# Patient Record
Sex: FEMALE | Race: WHITE | NOT HISPANIC OR LATINO | Employment: PART TIME | URBAN - METROPOLITAN AREA
[De-identification: names, ages, dates, MRNs, and addresses within clinical notes are randomized per-mention and may not be internally consistent; named-entity substitution may affect disease eponyms.]

---

## 2017-01-02 ENCOUNTER — TELEPHONE (OUTPATIENT)
Dept: FAMILY MEDICINE | Facility: CLINIC | Age: 62
End: 2017-01-02

## 2017-01-02 DIAGNOSIS — M25.512 LEFT SHOULDER PAIN: Primary | ICD-10-CM

## 2017-01-02 NOTE — TELEPHONE ENCOUNTER
Reason for call: Patient requesting PT orders for left shoulder pain.  She would like to go to [facility] office [address]    Phone Number Pt can be reached at: [phone]  Best Time: Anytime  Can we leave a detailed message on this number? Yes

## 2017-01-02 NOTE — TELEPHONE ENCOUNTER
I started to pend order for PT at [facility] for left shoulder pain.  I need to know the onset of when the left shoulder pain started.   I called and left VM for patient to call clinic back.    When patient calls back please ask when her left shoulder pain began so that we can place appropriate order.  Patient does not need to speak directly with a nurse unless requested.  Please ask above question and then route to triage.    Thank you!    Sujey Wilson RN

## 2017-01-19 ENCOUNTER — THERAPY VISIT (OUTPATIENT)
Dept: PHYSICAL THERAPY | Facility: CLINIC | Age: 62
End: 2017-01-19
Payer: COMMERCIAL

## 2017-01-19 DIAGNOSIS — M25.512 LEFT SHOULDER PAIN: Primary | ICD-10-CM

## 2017-01-19 PROCEDURE — 97110 THERAPEUTIC EXERCISES: CPT | Mod: GP | Performed by: PHYSICAL THERAPIST

## 2017-01-19 PROCEDURE — 97161 PT EVAL LOW COMPLEX 20 MIN: CPT | Mod: GP | Performed by: PHYSICAL THERAPIST

## 2017-01-19 PROCEDURE — 97112 NEUROMUSCULAR REEDUCATION: CPT | Mod: GP | Performed by: PHYSICAL THERAPIST

## 2017-01-19 NOTE — PROGRESS NOTES
Subjective:                                       Pertinent medical history includes:  Thyroid problems, sleep disorder/apnea and overweight.  Medical allergies: yes (Penicillin, Macrodantin, Sulfa).  Other surgeries include:  Other (Hysterectomy).  Current medications:  Thyroid medication and sleep medication.  Current occupation is  - Critical Care.                                  Objective:    System    Physical Exam    General     ROS    Assessment/Plan:

## 2017-01-19 NOTE — PROGRESS NOTES
Physical Therapy Initial Evaluation  January 19, 2017     Precautions/Restrictions/MD instructions: PT eval and treat.     Subjective:   Date of Onset: 6 months ago.  Primary Symptoms/Location of Pain: L shoulder pain laterally down to elbow (occasional sensation of tingling same place). Feels weak on L side. Denies neck pain. Quality of pain is dull and aching. Pains are described as intermittent in nature. Pain is worse: with increased use. Pain is rated 2/10.   History of symptoms: Pains began gradually as the result of insidious onset. Since onset, symptoms are unchanged.  Worsened by: Pushing off L arm when getting out of bathtub, reaching overhead and lifting objects, reaching to get seatbelt (90/90).    Alleviated by: Nothing.    General health as reported by patient: excellent  Pertinent medical/surgical history: none. Imaging: none. Current occupational status: . Patient's goals are: decrease pain. Return to MD:  PRN.     Therapist Impression:   Ambreen Mora is a 61 year old RHD female with 6-month history of L shoulder pain. She presents with signs and symptoms consistent with primary impingement. These impairments limit her ability to reach in a variety of directions and carry objects. Skilled PT services are necessary in order to reduce impairments and improve independent function.    Objective:  SHOULDER EXAMINATION    CERVICAL SCREEN  AROM: WNL/symmetrical and does not reproduce symptoms    THORACIC SPINE SCREEN  NA  Spring Testing: NA    STATIC POSTURE  Forward head on neck and Rounded shoulders     SCAPULAR KINEMATIC EVALUATION  Position/Test Findings   Hands resting at sides No obvious findings   Hands on hips No obvious findings   90deg scaption No obvious findings   Repeated scaption No obvious findings     SHOULDER RANGE OF MOTION & STRENGTH  (* = patient s pain)   PROM L PROM R AROM L AROM R MMT L MMT R   Flex   *158 (PA) 161     ABD   *161 (PA) 172 *3+ 4+   IR 78 74   5 5   ER 96 91    4 (mild pain) 4+     JOINT MOBILITY  NA    SPECIAL TESTS   (+) L: Neer, Jeffries-Connor, Empty can and Positive painful arc. Positive pull test for resisted ABD.   (-) L: apprehension    Assessment/Plan:    The patient is a 61 year old female with chief complaint of L shoulder pain.    The patient has the following significant findings with corresponding treatment plan.  Diagnosis 1:  L shoulder pain, primary impingement    Pain -  hot/cold therapy, US, electric stimulation, manual therapy, splint/taping/bracing/orthotics and self management  Decreased ROM/flexibility - manual therapy, therapeutic exercise and home program  Decreased joint mobility - manual therapy and therapeutic exercise  Decreased strength - therapeutic exercise, therapeutic activities and home program  Impaired muscle performance - neuro re-education  Decreased function - therapeutic activities  Impaired posture - neuro re-education    Therapy Evaluation Codes:   1) History comprised of:   Personal factors that impact the plan of care:      None.    Comorbidity factors that impact the plan of care are:      None.     Medications impacting care: None.  2) Examination of Body Systems comprised of:   Body structures and functions that impact the plan of care:      Shoulder.   Activity limitations that impact the plan of care are:      reaching and carrying.   Clinical presentation characteristics are:    Stable/Uncomplicated.  3) Presentation comprised of:   Presentation scored as Low complexity with uncomplicated characteristics..  4) Decision-Making    Low complexity using standardized patient assessment instrument and/or measureable assessment of functional outcome.  Cumulative Therapy Evaluation is: Low complexity.    Previous and current functional limitations:  (See Goal Flow Sheet for this information)    Short term and Long term goals: (See Goal Flow Sheet for this information)     Communication ability:  Patient appears to be able to  clearly communicate and understand verbal and written communication and follow directions correctly.  Treatment Explanation - The following has been discussed with the patient: RX ordered/plan of care, anticipated outcomes, and possible risks and side effects.  This patient would benefit from PT intervention to resume normal activities.   Rehab potential is good.    Frequency:  1 X week, once daily  Duration:  for 8 weeks  Discharge Plan: Achieve all LTGs, be independent in home treatment program, and reach maximal therapeutic benefit.    Please refer to the daily flowsheet for treatment today, total treatment time and time spent performing 1:1 timed codes.

## 2017-01-25 ENCOUNTER — TELEPHONE (OUTPATIENT)
Dept: FAMILY MEDICINE | Facility: CLINIC | Age: 62
End: 2017-01-25

## 2017-01-25 ENCOUNTER — HOSPITAL ENCOUNTER (EMERGENCY)
Facility: CLINIC | Age: 62
Discharge: HOME | End: 2017-01-25
Attending: PHYSICIAN ASSISTANT | Admitting: PHYSICIAN ASSISTANT
Payer: COMMERCIAL

## 2017-01-25 ENCOUNTER — APPOINTMENT (OUTPATIENT)
Dept: GENERAL RADIOLOGY | Facility: CLINIC | Age: 62
End: 2017-01-25
Attending: PHYSICIAN ASSISTANT
Payer: COMMERCIAL

## 2017-01-25 VITALS
SYSTOLIC BLOOD PRESSURE: 131 MMHG | DIASTOLIC BLOOD PRESSURE: 91 MMHG | WEIGHT: 212 LBS | BODY MASS INDEX: 33.27 KG/M2 | RESPIRATION RATE: 18 BRPM | HEIGHT: 67 IN | OXYGEN SATURATION: 95 % | TEMPERATURE: 98 F

## 2017-01-25 DIAGNOSIS — R07.89 ATYPICAL CHEST PAIN: ICD-10-CM

## 2017-01-25 DIAGNOSIS — R79.89 ELEVATED TSH: ICD-10-CM

## 2017-01-25 DIAGNOSIS — R79.89 DECREASED THYROXINE (T4) LEVEL: ICD-10-CM

## 2017-01-25 LAB
ALBUMIN SERPL-MCNC: 3.9 G/DL (ref 3.4–5)
ALP SERPL-CCNC: 82 U/L (ref 40–150)
ALT SERPL W P-5'-P-CCNC: 21 U/L (ref 0–50)
ANION GAP SERPL CALCULATED.3IONS-SCNC: 6 MMOL/L (ref 3–14)
AST SERPL W P-5'-P-CCNC: 15 U/L (ref 0–45)
BASOPHILS # BLD AUTO: 0 10E9/L (ref 0–0.2)
BASOPHILS NFR BLD AUTO: 0.5 %
BILIRUB SERPL-MCNC: 0.3 MG/DL (ref 0.2–1.3)
BUN SERPL-MCNC: 10 MG/DL (ref 7–30)
CALCIUM SERPL-MCNC: 8.5 MG/DL (ref 8.5–10.1)
CHLORIDE SERPL-SCNC: 107 MMOL/L (ref 94–109)
CO2 SERPL-SCNC: 27 MMOL/L (ref 20–32)
CREAT SERPL-MCNC: 0.76 MG/DL (ref 0.52–1.04)
D DIMER PPP FEU-MCNC: NORMAL UG/ML FEU (ref 0–0.5)
DIFFERENTIAL METHOD BLD: NORMAL
EOSINOPHIL # BLD AUTO: 0.1 10E9/L (ref 0–0.7)
EOSINOPHIL NFR BLD AUTO: 0.9 %
ERYTHROCYTE [DISTWIDTH] IN BLOOD BY AUTOMATED COUNT: 13.5 % (ref 10–15)
GFR SERPL CREATININE-BSD FRML MDRD: 77 ML/MIN/1.7M2
GLUCOSE SERPL-MCNC: 83 MG/DL (ref 70–99)
HCT VFR BLD AUTO: 42.1 % (ref 35–47)
HGB BLD-MCNC: 14.3 G/DL (ref 11.7–15.7)
IMM GRANULOCYTES # BLD: 0 10E9/L (ref 0–0.4)
IMM GRANULOCYTES NFR BLD: 0.6 %
INTERPRETATION ECG - MUSE: NORMAL
LYMPHOCYTES # BLD AUTO: 2.1 10E9/L (ref 0.8–5.3)
LYMPHOCYTES NFR BLD AUTO: 31.9 %
MCH RBC QN AUTO: 29.2 PG (ref 26.5–33)
MCHC RBC AUTO-ENTMCNC: 34 G/DL (ref 31.5–36.5)
MCV RBC AUTO: 86 FL (ref 78–100)
MONOCYTES # BLD AUTO: 0.4 10E9/L (ref 0–1.3)
MONOCYTES NFR BLD AUTO: 6.6 %
NEUTROPHILS # BLD AUTO: 3.9 10E9/L (ref 1.6–8.3)
NEUTROPHILS NFR BLD AUTO: 59.5 %
NRBC # BLD AUTO: 0 10*3/UL
NRBC BLD AUTO-RTO: 0 /100
PLATELET # BLD AUTO: 206 10E9/L (ref 150–450)
POTASSIUM SERPL-SCNC: 3.8 MMOL/L (ref 3.4–5.3)
PROT SERPL-MCNC: 7.8 G/DL (ref 6.8–8.8)
RBC # BLD AUTO: 4.89 10E12/L (ref 3.8–5.2)
SODIUM SERPL-SCNC: 140 MMOL/L (ref 133–144)
T4 FREE SERPL-MCNC: 0.58 NG/DL (ref 0.76–1.46)
TROPONIN I SERPL-MCNC: NORMAL UG/L (ref 0–0.04)
TSH SERPL DL<=0.005 MIU/L-ACNC: 6.4 MU/L (ref 0.4–4)
WBC # BLD AUTO: 6.5 10E9/L (ref 4–11)

## 2017-01-25 PROCEDURE — 96374 THER/PROPH/DIAG INJ IV PUSH: CPT

## 2017-01-25 PROCEDURE — 25000128 H RX IP 250 OP 636: Performed by: PHYSICIAN ASSISTANT

## 2017-01-25 PROCEDURE — 85025 COMPLETE CBC W/AUTO DIFF WBC: CPT | Performed by: PHYSICIAN ASSISTANT

## 2017-01-25 PROCEDURE — 93005 ELECTROCARDIOGRAM TRACING: CPT

## 2017-01-25 PROCEDURE — 25000132 ZZH RX MED GY IP 250 OP 250 PS 637: Performed by: PHYSICIAN ASSISTANT

## 2017-01-25 PROCEDURE — 84443 ASSAY THYROID STIM HORMONE: CPT | Performed by: PHYSICIAN ASSISTANT

## 2017-01-25 PROCEDURE — 84484 ASSAY OF TROPONIN QUANT: CPT | Performed by: PHYSICIAN ASSISTANT

## 2017-01-25 PROCEDURE — 85379 FIBRIN DEGRADATION QUANT: CPT | Performed by: PHYSICIAN ASSISTANT

## 2017-01-25 PROCEDURE — 25000125 ZZHC RX 250: Performed by: PHYSICIAN ASSISTANT

## 2017-01-25 PROCEDURE — 80053 COMPREHEN METABOLIC PANEL: CPT | Performed by: PHYSICIAN ASSISTANT

## 2017-01-25 PROCEDURE — 84439 ASSAY OF FREE THYROXINE: CPT | Performed by: PHYSICIAN ASSISTANT

## 2017-01-25 PROCEDURE — 96361 HYDRATE IV INFUSION ADD-ON: CPT

## 2017-01-25 PROCEDURE — 71020 XR CHEST 2 VW: CPT

## 2017-01-25 PROCEDURE — 99285 EMERGENCY DEPT VISIT HI MDM: CPT | Mod: 25

## 2017-01-25 RX ORDER — NITROGLYCERIN 0.4 MG/1
0.4 TABLET SUBLINGUAL ONCE
Status: COMPLETED | OUTPATIENT
Start: 2017-01-25 | End: 2017-01-25

## 2017-01-25 RX ORDER — LORAZEPAM 0.5 MG/1
0.25-0.5 TABLET ORAL EVERY 8 HOURS PRN
Qty: 4 TABLET | Refills: 0 | Status: SHIPPED | OUTPATIENT
Start: 2017-01-25 | End: 2017-01-25

## 2017-01-25 RX ORDER — KETOROLAC TROMETHAMINE 30 MG/ML
30 INJECTION, SOLUTION INTRAMUSCULAR; INTRAVENOUS ONCE
Status: COMPLETED | OUTPATIENT
Start: 2017-01-25 | End: 2017-01-25

## 2017-01-25 RX ORDER — LIDOCAINE 40 MG/G
CREAM TOPICAL
Status: DISCONTINUED | OUTPATIENT
Start: 2017-01-25 | End: 2017-01-25 | Stop reason: HOSPADM

## 2017-01-25 RX ORDER — SODIUM CHLORIDE 9 MG/ML
1000 INJECTION, SOLUTION INTRAVENOUS CONTINUOUS
Status: DISCONTINUED | OUTPATIENT
Start: 2017-01-25 | End: 2017-01-25 | Stop reason: HOSPADM

## 2017-01-25 RX ADMIN — SODIUM CHLORIDE 1000 ML: 9 INJECTION, SOLUTION INTRAVENOUS at 17:54

## 2017-01-25 RX ADMIN — NITROGLYCERIN 0.4 MG: 0.4 TABLET SUBLINGUAL at 20:09

## 2017-01-25 RX ADMIN — KETOROLAC TROMETHAMINE 30 MG: 30 INJECTION, SOLUTION INTRAMUSCULAR at 20:51

## 2017-01-25 ASSESSMENT — ENCOUNTER SYMPTOMS
ABDOMINAL PAIN: 0
NAUSEA: 0
PALPITATIONS: 1

## 2017-01-25 NOTE — LETTER
[place] [address] [facility]  [address]  [place]  [address]  Tel: [phone]    January 26, 2017    Ambreen Mora  [address] [place] [address]      Ambreen,     your thyroid is now low and we should increase Armor thyroid dose to 150 mg daily   Please let me know if you are taking that dose, my chart does not reflect this.   This test was done in ED and hope, you are better now.   Please let me know if you have any questions.     Nimesh,   Shawn Nolan/NB

## 2017-01-25 NOTE — ED AVS SNAPSHOT
Emergency Department    [address] [facility] [address]    Phone:  [phone]    Fax:  [phone]                                       Ambreen Mora   MRN: [id number]    Department:   Emergency Department   Date of Visit:  1/25/2017           After Visit Summary Signature Page     I have received my discharge instructions, and my questions have been answered. I have discussed any challenges I see with this plan with the nurse or doctor.    ..........................................................................................................................................  Patient/Patient Representative Signature      ..........................................................................................................................................  Patient Representative Print Name and Relationship to Patient    ..................................................               ................................................  Date                                            Time    ..........................................................................................................................................  Reviewed by Signature/Title    ...................................................              ..............................................  Date                                                            Time

## 2017-01-25 NOTE — TELEPHONE ENCOUNTER
Reason for call:  Patient reporting a symptom    Symptom or request: weight lose, weird sensations, feeling weird (her words)    Duration (how long have symptoms been present): 1-2 days    Have you been treated for this before? Yes    Additional comments:     Phone Number patient can be reached at: [phone]    Best Time:      Can we leave a detailed message on this number:  YES    Call taken on 1/25/2017 at 4:13 PM by Fermin West

## 2017-01-25 NOTE — ED AVS SNAPSHOT
Emergency Department    [address] [facility] [address]    Phone:  [phone]    Fax:  [phone]                                       Ambreen Mora   MRN: [id number]    Department:   Emergency Department   Date of Visit:  1/25/2017           Patient Information     Date Of Birth          1955        Your diagnoses for this visit were:     Atypical chest pain     Elevated TSH     Decreased thyroxine (T4) level        You were seen by Candida Garrido PA-C.      Follow-up Information     Follow up with  Emergency Department.    Specialty:  EMERGENCY MEDICINE    Why:  If symptoms worsen    Contact information:    [address] [facility] [address] [phone]        Follow up with Shawn Nolan MD In 2 days.    Specialty:  Internal Medicine    Contact information:    [facility]  [address] [id number] [phone]          Discharge Instructions       Follow up with primary care in 2 days to discuss thyroid labs.   Also if not improving.   Follow up for stress test.   Return for worsening pain, difficulty breathing, or any other new concerns.      Discharge Instructions  Chest Pain    You have been seen today for chest pain or discomfort.  At this time, your doctor has found no signs that your chest pain is due to a serious or life-threatening condition, (or you have declined more testing and/or admission to the hospital). However, sometimes there is a serious problem that does not show up right away. Your evaluation today may not be complete and you may need further testing and evaluation.     You need to follow-up with your regular doctor within 3 days.    Return to the Emergency Department if:    Your chest pain changes, gets worse, starts to happen more often, or comes with less activity.    You are short of breath.    You get very weak or tired.    You pass out or faint.    You have any new symptoms, like fever,  cough, numb legs, or you cough up blood.    You have anything else that worries you.    Until you follow-up with your regular doctor please do the following:    Take one aspirin daily unless you have an allergy or are told not to by your doctor.    If a stress test appointment has been made, go to the appointment.    If you have questions, contact your regular doctor.    If your doctor today has told you to follow-up with your regular doctor, it is very important that you make an appointment with your clinic and go to the appointment.  If you do not follow-up with your primary doctor, it may result in missing an important development which could result in permanent injury or disability and/or lasting pain.  If there is any problem keeping your appointment, call your doctor or return to the Emergency Department.    If you were given a prescription for medicine here today, be sure to read all of the information (including the package insert) that comes with your prescription.  This will include important information about the medicine, its side effects, and any warnings that you need to know about.  The pharmacist who fills the prescription can provide more information and answer questions you may have about the medicine.  If you have questions or concerns that the pharmacist cannot address, please call or return to the Emergency Department.         Remember that you can always come back to the Emergency Department if you are not able to see your regular doctor in the amount of time listed above, if you get any new symptoms, or if there is anything that worries you.          Future Appointments        Provider Department Dept Phone Center    1/26/2017 11:40 AM Juan Pablo Martin PT [facility] [phone]      24 Hour Appointment Hotline       To make an appointment at any [facility], call [phone] ([phone]). If you don't have a family doctor or  clinic, we will help you find one. [facility] are conveniently located to serve the needs of you and your family.          ED Discharge Orders     Exercise Stress Echocardiogram       The supervising Cardiologist may change the type of echocardiogram requested to answer a specific clinical question based on existing protocols in the Echocardiography laboratory.    Use of contrast is at the discretion of the supervising Cardiologist.                     Review of your medicines      Our records show that you are taking the medicines listed below. If these are incorrect, please call your family doctor or clinic.        Dose / Directions Last dose taken    atorvastatin 20 MG tablet   Commonly known as:  LIPITOR   Dose:  20 mg   Quantity:  90 tablet        Take 1 tablet (20 mg) by mouth daily   Refills:  3        cholecalciferol 1000 UNITS Tabs   Dose:  2000 Units        Take 2,000 Units by mouth daily.   Refills:  0        co-enzyme Q-10 100 MG Caps capsule        Take  by mouth daily.   Refills:  0        Fish Oil Oil        2-4 daily 1000mg.   Refills:  0        GLUCOSAMINE 1500 COMPLEX Caps   Dose:  1500 mg        Take 1,500 mg by mouth 2 times daily   Refills:  0        GLUCOSAMINE CHONDROITIN PLUS Caps   Dose:  1200 mg        Take 1,200 mg by mouth 2 times daily   Refills:  0        MULTI-VITAMIN PO        Take  by mouth.   Refills:  0        * thyroid 120 MG tablet   Commonly known as:  ARMOUR THYROID   Dose:  120 mg   Quantity:  90 tablet        Take 1 tablet (120 mg) by mouth daily   Refills:  3        * NP THYROID 90 MG Tabs   Quantity:  30 tablet   Generic drug:  Thyroid        TAKE ONE TABLET BY MOUTH EVERY DAY   Refills:  11        * VITAMIN B-12 PO   Dose:  1000 mcg        Take 1,000 mcg by mouth   Refills:  0        * CVS VITAMIN B-12 500 MCG Tabs   Dose:  500 mcg   Quantity:  150 tablet   Generic drug:  cyanocobalamin        Take 1 tablet (500 mcg) by mouth 2 times daily   Refills:  0        *  Notice:  This list has 4 medication(s) that are the same as other medications prescribed for you. Read the directions carefully, and ask your doctor or other care provider to review them with you.            Procedures and tests performed during your visit     CBC with platelets differential    Cardiac Continuous Monitoring    Comprehensive metabolic panel    D dimer quantitative    EKG 12 lead    Peripheral IV: Standard    Pulse oximetry nursing    T4 free    TSH with free T4 reflex    Troponin I    XR Chest 2 Views      Orders Needing Specimen Collection     None      Pending Results     Date and Time Order Name Status Description    1/25/2017 1746 XR Chest 2 Views Preliminary             Pending Culture Results     No orders found from 1/24/2017 to 1/26/2017.       Test Results from your hospital stay           1/25/2017  6:20 PM - Interface, Flexilab Results      Component Results     Component Value Ref Range & Units Status    WBC 6.5 4.0 - 11.0 10e9/L Final    RBC Count 4.89 3.8 - 5.2 10e12/L Final    Hemoglobin 14.3 11.7 - 15.7 g/dL Final    Hematocrit 42.1 35.0 - 47.0 % Final    MCV 86 78 - 100 fl Final    MCH 29.2 26.5 - 33.0 pg Final    MCHC 34.0 31.5 - 36.5 g/dL Final    RDW 13.5 10.0 - 15.0 % Final    Platelet Count 206 150 - 450 10e9/L Final    Diff Method Automated Method  Final    % Neutrophils 59.5 % Final    % Lymphocytes 31.9 % Final    % Monocytes 6.6 % Final    % Eosinophils 0.9 % Final    % Basophils 0.5 % Final    % Immature Granulocytes 0.6 % Final    Nucleated RBCs 0 0 /100 Final    Absolute Neutrophil 3.9 1.6 - 8.3 10e9/L Final    Absolute Lymphocytes 2.1 0.8 - 5.3 10e9/L Final    Absolute Monocytes 0.4 0.0 - 1.3 10e9/L Final    Absolute Eosinophils 0.1 0.0 - 0.7 10e9/L Final    Absolute Basophils 0.0 0.0 - 0.2 10e9/L Final    Abs Immature Granulocytes 0.0 0 - 0.4 10e9/L Final    Absolute Nucleated RBC 0.0  Final         1/25/2017  6:39 PM - Interface, Flexilab Results      Component Results      Component Value Ref Range & Units Status    Troponin I ES  0.000 - 0.045 ug/L Final    <0.015  The 99th percentile for upper reference range is 0.045 ug/L.  Troponin values in   the range of 0.045 - 0.120 ug/L may be associated with risks of adverse   clinical events.           1/25/2017  6:39 PM - Interface, Flexilab Results      Component Results     Component Value Ref Range & Units Status    Sodium 140 133 - 144 mmol/L Final    Potassium 3.8 3.4 - 5.3 mmol/L Final    Chloride 107 94 - 109 mmol/L Final    Carbon Dioxide 27 20 - 32 mmol/L Final    Anion Gap 6 3 - 14 mmol/L Final    Glucose 83 70 - 99 mg/dL Final    Urea Nitrogen 10 7 - 30 mg/dL Final    Creatinine 0.76 0.52 - 1.04 mg/dL Final    GFR Estimate 77 >60 mL/min/1.7m2 Final    Non  GFR Calc    GFR Estimate If Black >90   GFR Calc   >60 mL/min/1.7m2 Final    Calcium 8.5 8.5 - 10.1 mg/dL Final    Bilirubin Total 0.3 0.2 - 1.3 mg/dL Final    Albumin 3.9 3.4 - 5.0 g/dL Final    Protein Total 7.8 6.8 - 8.8 g/dL Final    Alkaline Phosphatase 82 40 - 150 U/L Final    ALT 21 0 - 50 U/L Final    AST 15 0 - 45 U/L Final         1/25/2017  6:39 PM - Interface, Flexilab Results      Component Results     Component Value Ref Range & Units Status    D Dimer  0.0 - 0.50 ug/ml FEU Final    <0.3  This D-dimer assay is intended for use in conjuntion with a clinical pretest   probability assessment model to exclude pulmonary embolism (PE) and as an aid   in the diagnosis of deep venous thrombosis (DVT) in outpatients suspected of PE   or DVT. The cut-off value is 0.5 g/mL FEU.           1/25/2017  6:29 PM - Interface, Radiant Ib      Narrative     CHEST TWO VIEW 1/25/2017 6:17 PM     COMPARISON: None.    HISTORY: Chest pain.    FINDINGS: The cardiac silhouette, pulmonary vasculature, lungs and  pleural spaces are within normal limits.        Impression     IMPRESSION: Clear lungs.         1/25/2017  6:43 PM - Interface, Flexilab Results       Component Results     Component Value Ref Range & Units Status    TSH 6.40 (H) 0.40 - 4.00 mU/L Final         1/25/2017  7:02 PM - Interface, Flexilab Results      Component Results     Component Value Ref Range & Units Status    T4 Free 0.58 (L) 0.76 - 1.46 ng/dL Final                Clinical Quality Measure: Blood Pressure Screening     Your blood pressure was checked while you were in the emergency department today. The last reading we obtained was  BP: 134/79 mmHg . Please read the guidelines below about what these numbers mean and what you should do about them.  If your systolic blood pressure (the top number) is less than 120 and your diastolic blood pressure (the bottom number) is less than 80, then your blood pressure is normal. There is nothing more that you need to do about it.  If your systolic blood pressure (the top number) is 120-139 or your diastolic blood pressure (the bottom number) is 80-89, your blood pressure may be higher than it should be. You should have your blood pressure rechecked within a year by a primary care provider.  If your systolic blood pressure (the top number) is 140 or greater or your diastolic blood pressure (the bottom number) is 90 or greater, you may have high blood pressure. High blood pressure is treatable, but if left untreated over time it can put you at risk for heart attack, stroke, or kidney failure. You should have your blood pressure rechecked by a primary care provider within the next 4 weeks.  If your provider in the emergency department today gave you specific instructions to follow-up with your doctor or provider even sooner than that, you should follow that instruction and not wait for up to 4 weeks for your follow-up visit.        Thank you for choosing [facility]       Thank you for choosing [facility] for your care. Our goal is always to provide you with excellent care. Hearing back from our patients is one way we can continue to improve our services.  Please take a few minutes to complete the written survey that you may receive in the mail after you visit with us. Thank you!        [facility] Information     [employer] gives you secure access to your electronic health record. If you see a primary care provider, you can also send messages to your care team and make appointments. If you have questions, please call your primary care clinic.  If you do not have a primary care provider, please call [phone] and they will assist you.        Care EveryWhere ID     This is your Care EveryWhere ID. This could be used by other organizations to access your [facility] medical records  [id number]        After Visit Summary       This is your record. Keep this with you and show to your community pharmacist(s) and doctor(s) at your next visit.

## 2017-01-26 ENCOUNTER — THERAPY VISIT (OUTPATIENT)
Dept: PHYSICAL THERAPY | Facility: CLINIC | Age: 62
End: 2017-01-26
Payer: COMMERCIAL

## 2017-01-26 DIAGNOSIS — M25.512 LEFT SHOULDER PAIN: Primary | ICD-10-CM

## 2017-01-26 PROCEDURE — 97112 NEUROMUSCULAR REEDUCATION: CPT | Mod: GP | Performed by: PHYSICAL THERAPIST

## 2017-01-26 PROCEDURE — 97110 THERAPEUTIC EXERCISES: CPT | Mod: GP | Performed by: PHYSICAL THERAPIST

## 2017-01-26 NOTE — TELEPHONE ENCOUNTER
Patient went to the ER yesterday for Chest Discomfort and needs to  Be see, need to see if  wants to squeeze her in?    Please call her back @ [phone] ok to lv a detailed msg    Thank you

## 2017-01-27 ENCOUNTER — OFFICE VISIT (OUTPATIENT)
Dept: FAMILY MEDICINE | Facility: CLINIC | Age: 62
End: 2017-01-27
Payer: COMMERCIAL

## 2017-01-27 VITALS
HEART RATE: 65 BPM | WEIGHT: 213 LBS | TEMPERATURE: 98.1 F | OXYGEN SATURATION: 98 % | BODY MASS INDEX: 33.43 KG/M2 | SYSTOLIC BLOOD PRESSURE: 138 MMHG | RESPIRATION RATE: 18 BRPM | DIASTOLIC BLOOD PRESSURE: 86 MMHG | HEIGHT: 67 IN

## 2017-01-27 DIAGNOSIS — M25.512 ACUTE PAIN OF LEFT SHOULDER: ICD-10-CM

## 2017-01-27 DIAGNOSIS — E03.9 ACQUIRED HYPOTHYROIDISM: ICD-10-CM

## 2017-01-27 DIAGNOSIS — M79.2 RADICULAR PAIN IN LEFT ARM: Primary | ICD-10-CM

## 2017-01-27 PROCEDURE — 99214 OFFICE O/P EST MOD 30 MIN: CPT | Performed by: INTERNAL MEDICINE

## 2017-01-27 RX ORDER — THYROID 120 MG/1
120 TABLET ORAL DAILY
Qty: 90 TABLET | Refills: 3 | Status: SHIPPED | OUTPATIENT
Start: 2017-01-27 | End: 2017-08-15

## 2017-01-27 NOTE — NURSING NOTE
"Chief Complaint   Patient presents with     Er F/u       Initial /86 mmHg  Pulse 65  Temp(Src) 98.1  F (36.7  C) (Tympanic)  Resp 18  Ht 5' 6.5\" (1.689 m)  Wt 213 lb (96.616 kg)  BMI 33.87 kg/m2  SpO2 98%  Breastfeeding? No Estimated body mass index is 33.87 kg/(m^2) as calculated from the following:    Height as of this encounter: 5' 6.5\" (1.689 m).    Weight as of this encounter: 213 lb (96.616 kg).  BP completed using cuff size: holly Long CMA January 27, 2017 3:56 PM      "

## 2017-01-27 NOTE — MR AVS SNAPSHOT
After Visit Summary   1/27/2017    Ambreen Mora    MRN: [id number]           Patient Information     Date Of Birth          1955        Visit Information        Provider Department      1/27/2017 4:00 PM Shawn Nolan MD [facility]        Today's Diagnoses     Radicular pain in left arm    -  1     Acute pain of left shoulder         Acquired hypothyroidism           Care Instructions    Physical therapy for neck    Increase Armor thyroid to 120 mg daily    Repeat TSH 8 weeks        Follow-ups after your visit        Additional Services     KLEBER PT, HAND, AND CHIROPRACTIC REFERRAL       **This order will print in the [facility] Scheduling Office**    Physical Therapy, Hand Therapy and Chiropractic Care are available through:    *[facility]  *[facility]  *[facility]    Call one number to schedule at any of the above locations: [phone].    Your provider has referred you to: Physical Therapy at [facility] or [facility]    Indication/Reason for Referral: Neck Pain and Shoulder Pain  Onset of Illness:   Therapy Orders: Evaluate and Treat  Special Programs: None  Special Request: None    Leon Quintero      Additional Comments for the Therapist or Chiropractor:     Please be aware that coverage of these services is subject to the terms and limitations of your health insurance plan.  Call member services at your health plan with any benefit or coverage questions.      Please bring the following to your appointment:    *Your personal calendar for scheduling future appointments  *Comfortable clothing                  Your next 10 appointments already scheduled     Feb 07, 2017 12:50 PM   KLEBER Extremity with Juan Pablo Martin PT   [facility] ([facility]  )    [address]  [place] [id number] [phone]              Future tests that were ordered for you today     Open Future Orders         "Priority Expected Expires Ordered    TSH with free T4 reflex Routine 3/29/2017 1/27/2018 1/27/2017            Who to contact     If you have questions or need follow up information about today's clinic visit or your schedule please contact [facility] directly at [phone].  Normal or non-critical lab and imaging results will be communicated to you by MyChart, letter or phone within 4 business days after the clinic has received the results. If you do not hear from us within 7 days, please contact the clinic through Cogn[employer] or phone. If you have a critical or abnormal lab result, we will notify you by phone as soon as possible.  Submit refill requests through [employer] or call your pharmacy and they will forward the refill request to us. Please allow 3 business days for your refill to be completed.          Additional Information About Your Visit        MyChart Information     [employer] gives you secure access to your electronic health record. If you see a primary care provider, you can also send messages to your care team and make appointments. If you have questions, please call your primary care clinic.  If you do not have a primary care provider, please call [phone] and they will assist you.        Care EveryWhere ID     This is your Care EveryWhere ID. This could be used by other organizations to access your [facility] medical records  [id number]        Your Vitals Were     Pulse Temperature Respirations    65 98.1  F (36.7  C) (Tympanic) 18    Height BMI (Body Mass Index) Pulse Oximetry    5' 6.5\" (1.689 m) 33.87 kg/m2 98%    Breastfeeding?          No         Blood Pressure from Last 3 Encounters:   01/27/17 138/86   01/25/17 131/91   10/14/16 127/78    Weight from Last 3 Encounters:   01/27/17 213 lb (96.616 kg)   01/25/17 212 lb (96.163 kg)   10/14/16 213 lb 3.2 oz (96.707 kg)              We Performed the Following     KLEBER PT, HAND, AND CHIROPRACTIC REFERRAL          Today's Medication Changes "          These changes are accurate as of: 1/27/17  4:30 PM.  If you have any questions, ask your nurse or doctor.               These medicines have changed or have updated prescriptions.        Dose/Directions    thyroid 120 MG tablet   Commonly known as:  ARMOUR THYROID   This may have changed:  Another medication with the same name was removed. Continue taking this medication, and follow the directions you see here.   Used for:  Acquired hypothyroidism   Changed by:  Shawn Nolan MD        Dose:  120 mg   Take 1 tablet (120 mg) by mouth daily   Quantity:  90 tablet   Refills:  3            Where to get your medicines      These medications were sent to [facility], [place] - [address]  [address], [place] [address]     Phone:  [phone]    - thyroid 120 MG tablet             Primary Care Provider Office Phone # Fax #    Shawn Nolan [phone]       [facility] [address]  [facility] [address]        Thank you!     Thank you for choosing [facility]  for your care. Our goal is always to provide you with excellent care. Hearing back from our patients is one way we can continue to improve our services. Please take a few minutes to complete the written survey that you may receive in the mail after your visit with us. Thank you!             Your Updated Medication List - Protect others around you: Learn how to safely use, store and throw away your medicines at www.disposemymeds.org.          This list is accurate as of: 1/27/17  4:30 PM.  Always use your most recent med list.                   Brand Name Dispense Instructions for use    atorvastatin 20 MG tablet    LIPITOR    90 tablet    Take 1 tablet (20 mg) by mouth daily       cholecalciferol 1000 UNITS Tabs      Take 2,000 Units by mouth daily.       co-enzyme Q-10 100 MG Caps capsule      Take  by mouth daily.       Fish Oil Oil      2-4 daily  1000mg.       GLUCOSAMINE 1500 COMPLEX Caps      Take 1,500 mg by mouth 2 times daily       GLUCOSAMINE CHONDROITIN PLUS Caps      Take 1,200 mg by mouth 2 times daily       MULTI-VITAMIN PO      Take  by mouth.       thyroid 120 MG tablet    ARMOUR THYROID    90 tablet    Take 1 tablet (120 mg) by mouth daily       * VITAMIN B-12 PO      Take 1,000 mcg by mouth       * [facility] VITAMIN B-12 500 MCG Tabs   Generic drug:  cyanocobalamin     150 tablet    Take 1 tablet (500 mcg) by mouth 2 times daily       * Notice:  This list has 2 medication(s) that are the same as other medications prescribed for you. Read the directions carefully, and ask your doctor or other care provider to review them with you.

## 2017-01-27 NOTE — PROGRESS NOTES
"  SUBJECTIVE:                                                    Ambreen Mora is a 61 year old female who presents to clinic today for the following health issues:      ED/UC Followup:    Facility:   [facility]  Date of visit: 01/25/2017  Reason for visit: Atypical Chest Pain  Current Status: same       Patient began physical therapy last week   She had called to refer for physical therapy   Massage also done after that  On 1/25, noted left arm pain and numbness    No weakness  Went to ED after many hours of chest pain     Had woken up  at midnight  She had fullness in chest and shakes  She did sleep again and came to work  Tightness was still there    Work up Negative     Thyroid was low though    Now better  But taking ibuprofen 400 mg twice daily   Also has pain in the back  Problem list and histories reviewed & adjusted, as indicated.  Additional history: as documented    Problem list, Medication list, Allergies, and Medical/Social/Surgical histories reviewed in [facility] and updated as appropriate.    ROS:  Constitutional, HEENT, cardiovascular, pulmonary, gi and gu systems are negative, except as otherwise noted.    OBJECTIVE:                                                    /86 mmHg  Pulse 65  Temp(Src) 98.1  F (36.7  C) (Tympanic)  Resp 18  Ht 5' 6.5\" (1.689 m)  Wt 213 lb (96.616 kg)  BMI 33.87 kg/m2  SpO2 98%  Breastfeeding? No  Body mass index is 33.87 kg/(m^2).  GENERAL: healthy, alert and no distress  NECK: no adenopathy, no asymmetry, masses, or scars and thyroid normal to palpation  RESP: lungs clear to auscultation - no rales, rhonchi or wheezes  CV: regular rate and rhythm, normal S1 S2, no S3 or S4, no murmur, click or rub, no peripheral edema and peripheral pulses strong  ABDOMEN: soft, nontender, no hepatosplenomegaly, no masses and bowel sounds normal  MS: no gross musculoskeletal defects noted, no edema    Admission on 01/25/2017, Discharged on 01/25/2017   Component Date Value Ref Range " Status     Interpretation ECG 01/25/2017 Click View Image link to view waveform and result   Final     WBC 01/25/2017 6.5  4.0 - 11.0 10e9/L Final     RBC Count 01/25/2017 4.89  3.8 - 5.2 10e12/L Final     Hemoglobin 01/25/2017 14.3  11.7 - 15.7 g/dL Final     Hematocrit 01/25/2017 42.1  35.0 - 47.0 % Final     MCV 01/25/2017 86  78 - 100 fl Final     MCH 01/25/2017 29.2  26.5 - 33.0 pg Final     MCHC 01/25/2017 34.0  31.5 - 36.5 g/dL Final     RDW 01/25/2017 13.5  10.0 - 15.0 % Final     Platelet Count 01/25/2017 206  150 - 450 10e9/L Final     Diff Method 01/25/2017 Automated Method   Final     % Neutrophils 01/25/2017 59.5   Final     % Lymphocytes 01/25/2017 31.9   Final     % Monocytes 01/25/2017 6.6   Final     % Eosinophils 01/25/2017 0.9   Final     % Basophils 01/25/2017 0.5   Final     % Immature Granulocytes 01/25/2017 0.6   Final     Nucleated RBCs 01/25/2017 0  0 /100 Final     Absolute Neutrophil 01/25/2017 3.9  1.6 - 8.3 10e9/L Final     Absolute Lymphocytes 01/25/2017 2.1  0.8 - 5.3 10e9/L Final     Absolute Monocytes 01/25/2017 0.4  0.0 - 1.3 10e9/L Final     Absolute Eosinophils 01/25/2017 0.1  0.0 - 0.7 10e9/L Final     Absolute Basophils 01/25/2017 0.0  0.0 - 0.2 10e9/L Final     Abs Immature Granulocytes 01/25/2017 0.0  0 - 0.4 10e9/L Final     Absolute Nucleated RBC 01/25/2017 0.0   Final     Troponin I ES 01/25/2017   0.000 - 0.045 ug/L Final                    Value:<0.015  The 99th percentile for upper reference range is 0.045 ug/L.  Troponin values in   the range of 0.045 - 0.120 ug/L may be associated with risks of adverse   clinical events.       Sodium 01/25/2017 140  133 - 144 mmol/L Final     Potassium 01/25/2017 3.8  3.4 - 5.3 mmol/L Final     Chloride 01/25/2017 107  94 - 109 mmol/L Final     Carbon Dioxide 01/25/2017 27  20 - 32 mmol/L Final     Anion Gap 01/25/2017 6  3 - 14 mmol/L Final     Glucose 01/25/2017 83  70 - 99 mg/dL Final     Urea Nitrogen 01/25/2017 10  7 - 30 mg/dL  Final     Creatinine 01/25/2017 0.76  0.52 - 1.04 mg/dL Final     GFR Estimate 01/25/2017 77  >60 mL/min/1.7m2 Final    Non  GFR Calc     GFR Estimate If Black 01/25/2017   >60 mL/min/1.7m2 Final                    Value:>90   GFR Calc       Calcium 01/25/2017 8.5  8.5 - 10.1 mg/dL Final     Bilirubin Total 01/25/2017 0.3  0.2 - 1.3 mg/dL Final     Albumin 01/25/2017 3.9  3.4 - 5.0 g/dL Final     Protein Total 01/25/2017 7.8  6.8 - 8.8 g/dL Final     Alkaline Phosphatase 01/25/2017 82  40 - 150 U/L Final     ALT 01/25/2017 21  0 - 50 U/L Final     AST 01/25/2017 15  0 - 45 U/L Final     D Dimer 01/25/2017   0.0 - 0.50 ug/ml FEU Final                    Value:<0.3  This D-dimer assay is intended for use in conjuntion with a clinical pretest   probability assessment model to exclude pulmonary embolism (PE) and as an aid   in the diagnosis of deep venous thrombosis (DVT) in outpatients suspected of PE   or DVT. The cut-off value is 0.5 g/mL FEU.       TSH 01/25/2017 6.40* 0.40 - 4.00 mU/L Final     T4 Free 01/25/2017 0.58* 0.76 - 1.46 ng/dL Final          ASSESSMENT/PLAN:                                                            1. Radicular pain in left arm  Patient has symptoms of radicular pain  We should still do stress test, but physical therapy would help  Advise about extension exercises  - [facility]LE[facility] PT, HAND, AND CHIROPRACTIC REFERRAL  - Exercise Stress test; Future    2. Acute pain of left shoulder  As above    - Exercise Stress test; Future    3. Acquired hypothyroidism  Patient Instructions   Physical therapy for neck    Increase Armor thyroid to 120 mg daily    Repeat TSH 8 weeks    Stress test        - TSH with free T4 reflex; Future  - thyroid (ARMOUR THYROID) 120 MG tablet; Take 1 tablet (120 mg) by mouth daily  Dispense: 90 tablet; Refill: 3        Shawn Nolan MD  [facility]

## 2017-01-27 NOTE — PATIENT INSTRUCTIONS
Physical therapy for neck    Increase Armor thyroid to 120 mg daily    Repeat TSH 8 weeks    Stress test

## 2017-02-07 ENCOUNTER — THERAPY VISIT (OUTPATIENT)
Dept: PHYSICAL THERAPY | Facility: CLINIC | Age: 62
End: 2017-02-07
Payer: COMMERCIAL

## 2017-02-07 DIAGNOSIS — M25.512 ACUTE PAIN OF LEFT SHOULDER: Primary | ICD-10-CM

## 2017-02-07 PROCEDURE — 97112 NEUROMUSCULAR REEDUCATION: CPT | Mod: GP | Performed by: PHYSICAL THERAPIST

## 2017-02-07 PROCEDURE — 97110 THERAPEUTIC EXERCISES: CPT | Mod: GP | Performed by: PHYSICAL THERAPIST

## 2017-02-15 ENCOUNTER — HOSPITAL ENCOUNTER (OUTPATIENT)
Dept: CARDIOLOGY | Facility: CLINIC | Age: 62
Discharge: HOME | End: 2017-02-15
Attending: INTERNAL MEDICINE | Admitting: INTERNAL MEDICINE
Payer: COMMERCIAL

## 2017-02-15 DIAGNOSIS — M79.2 RADICULAR PAIN IN LEFT ARM: ICD-10-CM

## 2017-02-15 DIAGNOSIS — M25.512 ACUTE PAIN OF LEFT SHOULDER: ICD-10-CM

## 2017-02-15 PROCEDURE — 93017 CV STRESS TEST TRACING ONLY: CPT

## 2017-02-15 PROCEDURE — 93018 CV STRESS TEST I&R ONLY: CPT | Performed by: INTERNAL MEDICINE

## 2017-02-15 PROCEDURE — 93016 CV STRESS TEST SUPVJ ONLY: CPT | Performed by: INTERNAL MEDICINE

## 2017-02-20 ENCOUNTER — MYC MEDICAL ADVICE (OUTPATIENT)
Dept: FAMILY MEDICINE | Facility: CLINIC | Age: 62
End: 2017-02-20

## 2017-02-20 NOTE — TELEPHONE ENCOUNTER
To PCP,  Please see patient message, can you please release her stress test results to [facility]?  I tried but was unable.  Wen Jerome RN

## 2017-02-20 NOTE — TELEPHONE ENCOUNTER
I sent patient mychart message that Stress Test Results were normal per Dr. Nolan.     Sujey Wilson RN

## 2017-02-28 ENCOUNTER — THERAPY VISIT (OUTPATIENT)
Dept: PHYSICAL THERAPY | Facility: CLINIC | Age: 62
End: 2017-02-28
Payer: COMMERCIAL

## 2017-02-28 DIAGNOSIS — M25.512 ACUTE PAIN OF LEFT SHOULDER: ICD-10-CM

## 2017-02-28 PROCEDURE — 97110 THERAPEUTIC EXERCISES: CPT | Mod: GP | Performed by: PHYSICAL THERAPIST

## 2017-04-21 DIAGNOSIS — E03.9 ACQUIRED HYPOTHYROIDISM: ICD-10-CM

## 2017-04-21 LAB
T4 FREE SERPL-MCNC: 0.72 NG/DL (ref 0.76–1.46)
TSH SERPL DL<=0.005 MIU/L-ACNC: 0.08 MU/L (ref 0.4–4)

## 2017-04-21 PROCEDURE — 36415 COLL VENOUS BLD VENIPUNCTURE: CPT | Performed by: INTERNAL MEDICINE

## 2017-04-21 PROCEDURE — 84443 ASSAY THYROID STIM HORMONE: CPT | Performed by: INTERNAL MEDICINE

## 2017-04-21 PROCEDURE — 84439 ASSAY OF FREE THYROXINE: CPT | Performed by: INTERNAL MEDICINE

## 2017-06-19 ENCOUNTER — HOSPITAL ENCOUNTER (OUTPATIENT)
Dept: BONE DENSITY | Facility: CLINIC | Age: 62
Discharge: HOME | End: 2017-06-19
Attending: INTERNAL MEDICINE | Admitting: INTERNAL MEDICINE
Payer: COMMERCIAL

## 2017-06-19 DIAGNOSIS — E55.9 VITAMIN D DEFICIENCY DISEASE: ICD-10-CM

## 2017-06-19 DIAGNOSIS — E89.40 SURGICAL MENOPAUSE: ICD-10-CM

## 2017-06-19 PROCEDURE — 77080 DXA BONE DENSITY AXIAL: CPT

## 2017-06-23 ENCOUNTER — OFFICE VISIT (OUTPATIENT)
Dept: SLEEP MEDICINE | Facility: CLINIC | Age: 62
End: 2017-06-23
Payer: COMMERCIAL

## 2017-06-23 VITALS
HEIGHT: 67 IN | WEIGHT: 211 LBS | HEART RATE: 80 BPM | DIASTOLIC BLOOD PRESSURE: 74 MMHG | SYSTOLIC BLOOD PRESSURE: 125 MMHG | OXYGEN SATURATION: 98 % | BODY MASS INDEX: 33.12 KG/M2

## 2017-06-23 DIAGNOSIS — G47.33 OSA (OBSTRUCTIVE SLEEP APNEA): Primary | ICD-10-CM

## 2017-06-23 PROCEDURE — 99204 OFFICE O/P NEW MOD 45 MIN: CPT | Performed by: INTERNAL MEDICINE

## 2017-06-23 NOTE — PATIENT INSTRUCTIONS

## 2017-06-23 NOTE — MR AVS SNAPSHOT
After Visit Summary   6/23/2017    Ambreen Mora    MRN: [id number]           Patient Information     Date Of Birth          1955        Visit Information        Provider Department      6/23/2017 1:00 PM Issa Whitehead MD [facility]        Today's Diagnoses     JASMYNE (obstructive sleep apnea)    -  1      Care Instructions      Your BMI is Body mass index is 33.55 kg/(m^2).  Weight management is a personal decision.  If you are interested in exploring weight loss strategies, the following discussion covers the approaches that may be successful. Body mass index (BMI) is one way to tell whether you are at a healthy weight, overweight, or obese. It measures your weight in relation to your height.  A BMI of 18.5 to 24.9 is in the healthy range. A person with a BMI of 25 to 29.9 is considered overweight, and someone with a BMI of 30 or greater is considered obese. More than two-thirds of American adults are considered overweight or obese.  Being overweight or obese increases the risk for further weight gain. Excess weight may lead to heart disease and diabetes.  Creating and following plans for healthy eating and physical activity may help you improve your health.  Weight control is part of healthy lifestyle and includes exercise, emotional health, and healthy eating habits. Careful eating habits lifelong are the mainstay of weight control. Though there are significant health benefits from weight loss, long-term weight loss with diet alone may be very difficult to achieve- studies show long-term success with dietary management in less than 10% of people. Attaining a healthy weight may be especially difficult to achieve in those with severe obesity. In some cases, medications, devices and surgical management might be considered.  What can you do?  If you are overweight or obese and are interested in methods for weight loss, you should discuss this with your provider.     Consider  reducing daily calorie intake by 500 calories.     Keep a food journal.     Avoiding skipping meals, consider cutting portions instead.    Diet combined with exercise helps maintain muscle while optimizing fat loss. Strength training is particularly important for building and maintaining muscle mass. Exercise helps reduce stress, increase energy, and improves fitness. Increasing exercise without diet control, however, may not burn enough calories to loose weight.       Start walking three days a week 10-20 minutes at a time    Work towards walking thirty minutes five days a week     Eventually, increase the speed of your walking for 1-2 minutes at time    In addition, we recommend that you review healthy lifestyles and methods for weight loss available through the National Institutes of Health patient information sites:  http://win.niddk.nih.gov/publications/index.htm    And look into health and wellness programs that may be available through your health insurance provider, employer, local community center, or shaggy club.    Weight management plan: Patient was referred to their PCP to discuss a diet and exercise plan.              Follow-ups after your visit        Who to contact     If you have questions or need follow up information about today's clinic visit or your schedule please contact [facility] directly at [phone].  Normal or non-critical lab and imaging results will be communicated to you by MyChart, letter or phone within 4 business days after the clinic has received the results. If you do not hear from us within 7 days, please contact the clinic through RateItAllhart or phone. If you have a critical or abnormal lab result, we will notify you by phone as soon as possible.  Submit refill requests through [employer] or call your pharmacy and they will forward the refill request to us. Please allow 3 business days for your refill to be completed.          Additional Information About Your  "Visit        MyChart Information     [employer] gives you secure access to your electronic health record. If you see a primary care provider, you can also send messages to your care team and make appointments. If you have questions, please call your primary care clinic.  If you do not have a primary care provider, please call [phone] and they will assist you.        Care EveryWhere ID     This is your Care EveryWhere ID. This could be used by other organizations to access your [facility] medical records  [id number]        Your Vitals Were     Pulse Height Pulse Oximetry BMI (Body Mass Index)          80 1.689 m (5' 6.5\") 98% 33.55 kg/m2         Blood Pressure from Last 3 Encounters:   06/23/17 125/74   01/27/17 138/86   01/25/17 (!) 131/91    Weight from Last 3 Encounters:   06/23/17 95.7 kg (211 lb)   01/27/17 96.6 kg (213 lb)   01/25/17 96.2 kg (212 lb)              We Performed the Following     Comprehensive DME        Primary Care Provider Office Phone # Fax #    Bhtorres Nolan [phone]       [facility] [address]  [place] [address]        Equal Access to Services     BENJI BARBA : Hadii aad ku hadasho Soomaali, waaxda luqadaha, qaybta kaalmada adeegyada, waxay zulemain haytaran roland clemons . So [facility] [phone].    ATENCIÓN: Si habla español, tiene a walker disposición servicios gratuitos de asistencia lingüística. Llame al [phone].    We comply with applicable federal civil rights laws and [place] laws. We do not discriminate on the basis of race, color, national origin, age, disability sex, sexual orientation or gender identity.            Thank you!     Thank you for choosing [facility]  for your care. Our goal is always to provide you with excellent care. Hearing back from our patients is one way we can continue to improve our services. Please take a few minutes to complete the written survey that you may receive in the mail after " your visit with us. Thank you!             Your Updated Medication List - Protect others around you: Learn how to safely use, store and throw away your medicines at www.disposemymeds.org.          This list is accurate as of: 6/23/17 11:59 PM.  Always use your most recent med list.                   Brand Name Dispense Instructions for use Diagnosis    atorvastatin 20 MG tablet    LIPITOR    90 tablet    Take 1 tablet (20 mg) by mouth daily    Hyperlipidemia with target LDL less than 130       BENADRYL PO      Take 25 mg by mouth        cholecalciferol 1000 UNITS Tabs      Take 2,000 Units by mouth daily.        co-enzyme Q-10 100 MG Caps capsule      Take  by mouth daily.        Fish Oil Oil      2-4 daily 1000mg.        thyroid 120 MG tablet    ARMOUR THYROID    90 tablet    Take 1 tablet (120 mg) by mouth daily    Acquired hypothyroidism       * VITAMIN B-12 PO      Take 1,000 mcg by mouth        * [facility] VITAMIN B-12 500 MCG Tabs   Generic drug:  cyanocobalamin     150 tablet    Take 1 tablet (500 mcg) by mouth 2 times daily        * Notice:  This list has 2 medication(s) that are the same as other medications prescribed for you. Read the directions carefully, and ask your doctor or other care provider to review them with you.

## 2017-06-23 NOTE — NURSING NOTE
"Chief Complaint   Patient presents with     Consult     Continuation of for cpap       Initial /74  Pulse 80  Ht 1.689 m (5' 6.5\")  Wt 95.7 kg (211 lb)  SpO2 98%  BMI 33.55 kg/m2 Estimated body mass index is 33.55 kg/(m^2) as calculated from the following:    Height as of this encounter: 1.689 m (5' 6.5\").    Weight as of this encounter: 95.7 kg (211 lb).  Medication Reconciliation: complete     ELHAM Chou        "

## 2017-06-24 NOTE — PROGRESS NOTES
SLEEP EVALUATION       DATE OF SERVICE:  06/23/2017.      CHIEF COMPLAINT:  Sleep apnea.      HISTORY OF PRESENT ILLNESS:  Ms. Ambreen Mora is a 61-year-old female who presents in clinic today to establish care for her sleep apnea management.  She was diagnosed with sleep apnea following a polysomnogram at the [facility] on 09/07/2011.  Her weight was 233.3 pounds.  The patient had an apnea-hypopnea index of 22 per hour and RDI of 31 per hour.  The lowest oxygen saturation was 69%. The patient is currently prescribed CPAP therapy at a pressure of 11 cm of water.      She had a good response to CPAP treatment.  Prior to initiating therapy, she had loud and frequent snoring and was excessively sleepy in the daytime.  These symptoms resolved with the use of CPAP.  She has religiously used therapy since then.  There is no breakthrough snoring while using treatment.  She denies having gasping episodes while on CPAP treatment.  She uses nasal pillows which are comfortable for her.      The patient has been on a weight management plan and has now reduced her weight to 211 pounds.  She will continue on her weight management program and aims to lose another 20 pounds.  She has not had a night without CPAP to see if she will have snoring while not on treatment.  She continues to experience the benefit with the use of CPAP.      For the last 6 months, patient has been taking over-the-counter Benadryl at bedtime.  For many years, she has woken up by around 4:00 a.m. and on some nights, struggle to get back into sleep.  She started taking Benadryl with the hope that she could sleep through the night.  For the last 3 months, she has been experiencing tiredness during the daytime, especially after work when she drives home.  She has a half an hour drive during which she experiences some drowsiness.  She denies any history of motor vehicle accidents or near misses.      The patient's bedtime is at 10:30 p.m.  She  can usually fall asleep within half an hour.  She may wake up 1-3 times during the night when her  gets up to use the bathroom.  She tends to wake up around 4:00 a.m. and return to sleep without much difficulty.  On 3 nights a month, she cannot get back into sleep and will start her day at this time.  She denies having morning headaches.      There is no history of restless legs.  There is no history of dream enactment behavior or other parasomnias.      The patient drinks 1 cup of coffee and a caffeinated soda daily.      PAST MEDICAL HISTORY:   1.  Hypothyroidism.   2.  Hyperlipidemia.      FAMILY HISTORY:  No family history of sleep disorders.      SOCIAL HISTORY:  She works as a  at the hospital.  There is no history of smoking.      REVIEW OF SYSTEMS:  A complete review of systems was negative except for weight loss, muscle pain.     Exam:  Constitutional: healthy, alert and no distress  Head: Normocephalic. No masses, lesions, tenderness or abnormalities  Neck: Neck supple. No adenopathy. Thyroid symmetric, normal size,  ENT: ENT exam normal, no neck nodes or sinus tenderness  Cardiovascular: negative, PMI normal. No lifts, heaves, or thrills. RRR. No murmurs, clicks gallops or rub  Respiratory: negative, Percussion normal. Good diaphragmatic excursion. Lungs clear  Gastrointestinal: negative  Musculoskeletal: extremities normal- no gross deformities noted, gait normal and normal muscle tone  Skin: no suspicious lesions or rashes  Neurologic: Gait normal. Reflexes normal and symmetric. Sensation grossly WNL.  Psychiatric: mentation appears normal and affect normal/bright     IMPRESSION AND PLAN:  Moderate obstructive sleep apnea.      The patient has had successful treatment of her sleep apnea with a CPAP pressure of 11 cm of water.  A download from her CPAP device has demonstrated regular compliance at 100% over the last 6 months and an average daily usage of 67 and 27 minutes, average AHI is  normal at 0.9 per hour.      Over the last 3 months, patient experiences daytime fatigue and sleepiness.  She has hypothyroidism and thinks that her tiredness is linked to her thyroid dysfunction.  I also note that she has been taking over-the-counter Benadryl over the last 6 months.  I have discouraged its use, mainly because of possible residual sleepiness during the day.      Her sleep apnea is adequately treated, as demonstrated by resolution of her symptoms as well as a normal AHI on her download.  She can continue CPAP therapy at a pressure of 11 cm of water.  She would obtain new supplies through our DME.      She is currently on a weight management program.  Once she has achieved her target weight loss, we can reassess her sleep disordered breathing with an overnight sleep study.  If her sleep apnea has reduced to a milder degree, we can consider a dental appliance for treatment.      The patient was counseled regarding her download findings and reassured that her sleep apnea is adequately treated.  We also discussed better sleep hygiene and ways to manage her arousals in the morning.      TREATMENT PLAN:   1.  Continue with CPAP therapy at a pressure of 11 cm of water.   2.  Obtain new CPAP supplies and mask fitting through our DME.   3.  Follow up in a year.      I spent a total of 45 minutes with this patient, with more than 50% spent in counseling.         TARA CORDERO MD             D: 2017 16:52   T: 2017 09:11   MT: VERONICA      Name:     CECI CAGE   MRN:      [id number]        Account:      [id number]   :      1955           Visit Date:   2017      Document: [id number]       cc: Shawn Nolan MD

## 2017-06-26 ENCOUNTER — TELEPHONE (OUTPATIENT)
Dept: SLEEP MEDICINE | Facility: CLINIC | Age: 62
End: 2017-06-26

## 2017-07-05 ENCOUNTER — TELEPHONE (OUTPATIENT)
Dept: FAMILY MEDICINE | Facility: CLINIC | Age: 62
End: 2017-07-05

## 2017-07-05 DIAGNOSIS — Z12.11 SPECIAL SCREENING FOR MALIGNANT NEOPLASMS, COLON: Primary | ICD-10-CM

## 2017-07-05 NOTE — TELEPHONE ENCOUNTER
Reason for Call:  Other call back    Detailed comments: pt. Said she spoke with Dr. Nolan about having a Flexible SIG test done.  Wondering if she needs a referral for this?  How does she schedule an appointment for it?  Please call to advise    Phone Number Patient can be reached at: Cell number on file:  [phone]         Best Time: any time    Can we leave a detailed message on this number? YES    Call taken on 7/5/2017 at 3:43 PM by Monica Kebede  .

## 2017-07-05 NOTE — TELEPHONE ENCOUNTER
I reviewed the notes since the start of this year and could not locate documentation of recommendation for flexible sigmoidoscopy.  Please clarify with patient if she can recall the reason for having the test done.  Her colon cancer screening via FIT test is still up-to-date.

## 2017-07-06 NOTE — TELEPHONE ENCOUNTER
Routing to Dr. Nolan to review when she returns.     Pt states she discussed with Dr. Nolan having a flex SIG test done due to prior difficulty during her last colonoscopy.   Pt states she has a hx of ABD adhesions so she reports PCP suggested a flex SIG test as an alternative to a full colonoscopy.   Pt states she is not having any symptoms. Pt states she is ok waiting for Dr. Nolan to return.     Esther Ortega RN

## 2017-07-07 NOTE — TELEPHONE ENCOUNTER
I called and spoke with patient.   She is interested in the cologram.     PCP already placed order for CT Colonography Screening.  I told patient that imaging should be reaching out to her to schedule within next few days.    Sujey Wilson RN

## 2017-07-10 DIAGNOSIS — E03.9 ACQUIRED HYPOTHYROIDISM: ICD-10-CM

## 2017-07-10 PROCEDURE — 84443 ASSAY THYROID STIM HORMONE: CPT | Performed by: INTERNAL MEDICINE

## 2017-07-10 PROCEDURE — 84439 ASSAY OF FREE THYROXINE: CPT | Performed by: INTERNAL MEDICINE

## 2017-07-10 PROCEDURE — 36415 COLL VENOUS BLD VENIPUNCTURE: CPT | Performed by: INTERNAL MEDICINE

## 2017-07-11 LAB
T4 FREE SERPL-MCNC: 0.67 NG/DL (ref 0.76–1.46)
TSH SERPL DL<=0.005 MIU/L-ACNC: 0.11 MU/L (ref 0.4–4)

## 2017-08-14 ENCOUNTER — TRANSFERRED RECORDS (OUTPATIENT)
Dept: HEALTH INFORMATION MANAGEMENT | Facility: CLINIC | Age: 62
End: 2017-08-14

## 2017-08-14 ENCOUNTER — HOSPITAL ENCOUNTER (OUTPATIENT)
Dept: CT IMAGING | Facility: CLINIC | Age: 62
Discharge: HOME | End: 2017-08-14
Attending: INTERNAL MEDICINE | Admitting: INTERNAL MEDICINE
Payer: COMMERCIAL

## 2017-08-14 DIAGNOSIS — Z12.11 SPECIAL SCREENING FOR MALIGNANT NEOPLASMS, COLON: ICD-10-CM

## 2017-08-14 PROCEDURE — 74263 CT COLONOGRAPHY SCREENING: CPT

## 2017-08-15 ENCOUNTER — MYC MEDICAL ADVICE (OUTPATIENT)
Dept: FAMILY MEDICINE | Facility: CLINIC | Age: 62
End: 2017-08-15

## 2017-08-15 DIAGNOSIS — E03.9 ACQUIRED HYPOTHYROIDISM: ICD-10-CM

## 2017-08-15 RX ORDER — THYROID 120 MG/1
120 TABLET ORAL DAILY
Qty: 90 TABLET | Refills: 3 | Status: SHIPPED | OUTPATIENT
Start: 2017-08-15 | End: 2018-06-04

## 2017-09-26 ENCOUNTER — TELEPHONE (OUTPATIENT)
Dept: FAMILY MEDICINE | Facility: CLINIC | Age: 62
End: 2017-09-26

## 2017-09-26 DIAGNOSIS — G47.33 OSA (OBSTRUCTIVE SLEEP APNEA): ICD-10-CM

## 2017-09-26 DIAGNOSIS — E03.9 ACQUIRED HYPOTHYROIDISM: Primary | ICD-10-CM

## 2017-09-26 DIAGNOSIS — E78.5 HYPERLIPIDEMIA WITH TARGET LDL LESS THAN 130: ICD-10-CM

## 2017-09-26 DIAGNOSIS — E55.9 VITAMIN D DEFICIENCY DISEASE: ICD-10-CM

## 2017-09-26 NOTE — TELEPHONE ENCOUNTER
Reason for Call: Request for an order or referral:    Order or referral being requested: px    Date needed: before my next appointment    Has the patient been seen by the PCP for this problem? YES    Additional comments: pt needs px lab orders    Phone number Patient can be reached at:  Home number on file [phone] (home)    Best Time:  any    Can we leave a detailed message on this number?  YES    Call taken on 9/26/2017 at 9:08 AM by Seth Rojas

## 2017-09-26 NOTE — TELEPHONE ENCOUNTER
Detailed VM left (ok per below) to inform pt labs were placed and that she'll want to be fasting when she has them completed.  Lalitha SAENZ RN

## 2017-10-04 ENCOUNTER — TELEPHONE (OUTPATIENT)
Dept: FAMILY MEDICINE | Facility: CLINIC | Age: 62
End: 2017-10-04

## 2017-10-04 NOTE — TELEPHONE ENCOUNTER
Ambreen called and I spoke with her.   She reports of left lower abdomen pain that is sharp.   Onset: 1 month ago  Pain occurs when she uses abdomen, sneezes, or gets out of bed.   She states she does not feel bulge.   No nausea or vomiting  No bowel or bladder changes.       Patient did have CT Colonography on 8/14/17. Small hiatal hernia was found.   At that time patient was not having symptoms      [place] going [place] and would like to have this assessed prior to leaving.   Want to run this past Dr. Nolan for further advise.     Patient works within [email or web address] and can be reached at [phone] and CAN LEAVE DETAIL IN .    PCP: please advise.    Sujey Wilson RN

## 2017-10-04 NOTE — TELEPHONE ENCOUNTER
Reason for Call:  Virtual Colonoscopy     Detailed comments: Colonoscopy was done at [facility] on 8/14 was when she  had the Virtual Colonoscopy and is now having some pain and would like to talk to a Nurse about this    Phone Number Patient can be reached at: Work number on file:  [phone] (work)    Best Time: anytime    Can we leave a detailed message on this number? YES    Call taken on 10/4/2017 at 9:08 AM by Wilner Barnhart

## 2017-10-05 ENCOUNTER — OFFICE VISIT (OUTPATIENT)
Dept: FAMILY MEDICINE | Facility: CLINIC | Age: 62
End: 2017-10-05
Payer: COMMERCIAL

## 2017-10-05 VITALS
WEIGHT: 218 LBS | OXYGEN SATURATION: 97 % | TEMPERATURE: 97.3 F | HEIGHT: 67 IN | SYSTOLIC BLOOD PRESSURE: 142 MMHG | DIASTOLIC BLOOD PRESSURE: 82 MMHG | BODY MASS INDEX: 34.21 KG/M2 | HEART RATE: 77 BPM

## 2017-10-05 DIAGNOSIS — K57.32 DIVERTICULITIS OF COLON: Primary | ICD-10-CM

## 2017-10-05 DIAGNOSIS — Z88.9 DRUG ALLERGY: ICD-10-CM

## 2017-10-05 LAB
BASOPHILS # BLD AUTO: 0 10E9/L (ref 0–0.2)
BASOPHILS NFR BLD AUTO: 0.3 %
DIFFERENTIAL METHOD BLD: NORMAL
EOSINOPHIL # BLD AUTO: 0.1 10E9/L (ref 0–0.7)
EOSINOPHIL NFR BLD AUTO: 1.8 %
ERYTHROCYTE [DISTWIDTH] IN BLOOD BY AUTOMATED COUNT: 13.5 % (ref 10–15)
HCT VFR BLD AUTO: 40.5 % (ref 35–47)
HGB BLD-MCNC: 13.4 G/DL (ref 11.7–15.7)
LYMPHOCYTES # BLD AUTO: 2.4 10E9/L (ref 0.8–5.3)
LYMPHOCYTES NFR BLD AUTO: 33 %
MCH RBC QN AUTO: 28.7 PG (ref 26.5–33)
MCHC RBC AUTO-ENTMCNC: 33.1 G/DL (ref 31.5–36.5)
MCV RBC AUTO: 87 FL (ref 78–100)
MONOCYTES # BLD AUTO: 0.7 10E9/L (ref 0–1.3)
MONOCYTES NFR BLD AUTO: 9.5 %
NEUTROPHILS # BLD AUTO: 4.1 10E9/L (ref 1.6–8.3)
NEUTROPHILS NFR BLD AUTO: 55.4 %
PLATELET # BLD AUTO: 218 10E9/L (ref 150–450)
RBC # BLD AUTO: 4.67 10E12/L (ref 3.8–5.2)
WBC # BLD AUTO: 7.3 10E9/L (ref 4–11)

## 2017-10-05 PROCEDURE — 85025 COMPLETE CBC W/AUTO DIFF WBC: CPT | Performed by: INTERNAL MEDICINE

## 2017-10-05 PROCEDURE — 86140 C-REACTIVE PROTEIN: CPT | Performed by: INTERNAL MEDICINE

## 2017-10-05 PROCEDURE — 99213 OFFICE O/P EST LOW 20 MIN: CPT | Performed by: INTERNAL MEDICINE

## 2017-10-05 PROCEDURE — 36415 COLL VENOUS BLD VENIPUNCTURE: CPT | Performed by: INTERNAL MEDICINE

## 2017-10-05 RX ORDER — CIPROFLOXACIN 500 MG/1
500 TABLET, FILM COATED ORAL 2 TIMES DAILY
Qty: 14 TABLET | Refills: 0 | Status: SHIPPED | OUTPATIENT
Start: 2017-10-05 | End: 2017-10-05

## 2017-10-05 RX ORDER — CIPROFLOXACIN 500 MG/1
500 TABLET, FILM COATED ORAL 2 TIMES DAILY
Qty: 14 TABLET | Refills: 0 | Status: SHIPPED | OUTPATIENT
Start: 2017-10-05 | End: 2017-11-28

## 2017-10-05 RX ORDER — METRONIDAZOLE 500 MG/1
500 TABLET ORAL 3 TIMES DAILY
Qty: 21 TABLET | Refills: 0 | Status: SHIPPED | OUTPATIENT
Start: 2017-10-05 | End: 2017-10-05

## 2017-10-05 RX ORDER — METRONIDAZOLE 500 MG/1
500 TABLET ORAL 3 TIMES DAILY
Qty: 21 TABLET | Refills: 0 | Status: SHIPPED | OUTPATIENT
Start: 2017-10-05 | End: 2017-11-28

## 2017-10-05 NOTE — Clinical Note
Please abstract the following data from this visit with this patient into the appropriate field in Epic:  Colonoscopy done on this date: CT COLONOGRAPHY SCREENING August 14, 2017 , by this group:  UNDER IMAGING in EPIC , results were ---.

## 2017-10-05 NOTE — PROGRESS NOTES
"  SUBJECTIVE:   Ambreen Mora is a 62 year old female who presents to clinic today for the following health issues:    Chief Complaint   Patient presents with     Abdominal Pain     1 month.  Pain with sneezing or movement of sitting up from prone position            Patient had her CT cologram one month ago    Nowleft lower quadrant is painful    She thinks it could be a hernia    Bowel movements are normal but she tends to get constipated    There has been no fever or chills          Problem list and histories reviewed & adjusted, as indicated.  Additional history: as documented    Patient Active Problem List   Diagnosis     JASMYNE (obstructive sleep apnea)     Hyperlipidemia with target LDL less than 130     External bleeding hemorrhoids     Esophageal dysmotility     Obesity     Vitamin D deficiency disease     Acquired hypothyroidism     Left shoulder pain     Past Surgical History:   Procedure Laterality Date     BLADDER SURGERY  19    honeycomb     COLONOSCOPY  2005     HYSTERECTOMY, PAP NO LONGER INDICATED  1980s       Social History   Substance Use Topics     Smoking status: Never Smoker     Smokeless tobacco: Never Used     Alcohol use No     Family History   Problem Relation Age of Onset     Hearing Loss Mother      mennier's disease     Circulatory Brother      valve issues     Brain Tumor Brother              Reviewed and updated as needed this visit by clinical staffAllergies  Meds  Problems       Reviewed and updated as needed this visit by Provider  Allergies  Meds  Problems         ROS:  Constitutional, HEENT, cardiovascular, pulmonary, GI, , musculoskeletal, neuro, skin, endocrine and psych systems are negative, except as otherwise noted.      OBJECTIVE:   /82 (Cuff Size: Adult Large)  Pulse 77  Temp 97.3  F (36.3  C) (Oral)  Ht 5' 6.5\" (1.689 m)  Wt 218 lb (98.9 kg)  SpO2 97%  Breastfeeding? No  BMI 34.66 kg/m2  Body mass index is 34.66 kg/(m^2).  Left lower quadrant is tender " to palpation  Bowel sounds are normal and there is no rebound    Cardiorespiratory exam is normal        ASSESSMENT/PLAN:             1. Diverticulitis of colon  We discussed about diverticulitis diet  She will update me on Monday  - CBC with platelets differential  - CRP inflammation  - ciprofloxacin (CIPRO) 500 MG tablet; Take 1 tablet (500 mg) by mouth 2 times daily  Dispense: 14 tablet; Refill: 0  - metroNIDAZOLE (FLAGYL) 500 MG tablet; Take 1 tablet (500 mg) by mouth 3 times daily  Dispense: 21 tablet; Refill: 0    2. Drug allergy  Patient has multiple allergies to very important and antibiotics including sulfa and penicillin  These are old histories and she perhaps could take those  We will consult allergist to desensitize  - ALLERGY/ASTHMA ADULT REFERRAL        Shawn Nolan MD  [facility]

## 2017-10-05 NOTE — MR AVS SNAPSHOT
After Visit Summary   10/5/2017    Ambreen Mora    MRN: [id number]           Patient Information     Date Of Birth          1955        Visit Information        Provider Department      10/5/2017 4:30 PM Shawn Nolan MD [facility]        Today's Diagnoses     Diverticulitis of colon    -  1    Drug allergy          Care Instructions    Soft foods, 2-3 days    Avoid raw vegetables, fruits     Less fibre for 3-7 days                Follow-ups after your visit        Additional Services     ALLERGY/ASTHMA ADULT REFERRAL       Your provider has referred you to: N: [facility] - [place] [phone]   [email or web address]    Dr Daysi Bray    Please be aware that coverage of these services is subject to the terms and limitations of your health insurance plan.  Call member services at your health plan with any benefit or coverage questions.      Please bring the following with you to your appointment:    (1) Any X-Rays, CTs or MRIs which have been performed.  Contact the facility where they were done to arrange for  prior to your scheduled appointment.    (2) List of current medications  (3) This referral request   (4) Any documents/labs given to you for this referral                  Your next 10 appointments already scheduled     Dec 04, 2017  8:00 AM CST   LAB with CS LAB   [facility]    [address] [address] [phone]           Patient must bring picture ID. Patient should be prepared to give a urine specimen  Please do not eat 10-12 hours before your appointment if you are coming in fasting for labs on lipids, cholesterol, or glucose (sugar). Pregnant women should follow their Care Team instructions. Water with medications is okay. Do not drink coffee or other fluids. If you have concerns about taking  your medications, please ask at office or if scheduling via [employer], send a  "message by clicking on Secure Messaging, Message Your Care Team.            Dec 11, 2017  3:30 PM CST   PHYSICAL with Shawn Nolan MD   [facility]    [address] [facility] [address] [phone]              Who to contact     If you have questions or need follow up information about today's clinic visit or your schedule please contact [facility] directly at [phone].  Normal or non-critical lab and imaging results will be communicated to you by [employer], letter or phone within 4 business days after the clinic has received the results. If you do not hear from us within 7 days, please contact the clinic through [employer] or phone. If you have a critical or abnormal lab result, we will notify you by phone as soon as possible.  Submit refill requests through [employer] or call your pharmacy and they will forward the refill request to us. Please allow 3 business days for your refill to be completed.          Additional Information About Your Visit        [employer] Information     [employer] gives you secure access to your electronic health record. If you see a primary care provider, you can also send messages to your care team and make appointments. If you have questions, please call your primary care clinic.  If you do not have a primary care provider, please call [phone] and they will assist you.        Care EveryWhere ID     This is your Care EveryWhere ID. This could be used by other organizations to access your [place] medical records  [id number]        Your Vitals Were     Pulse Temperature Height Pulse Oximetry Breastfeeding? BMI (Body Mass Index)    77 97.3  F (36.3  C) (Oral) 5' 6.5\" (1.689 m) 97% No 34.66 kg/m2       Blood Pressure from Last 3 Encounters:   10/05/17 142/82   06/23/17 125/74   01/27/17 138/86    Weight from Last 3 Encounters:   10/05/17 218 lb (98.9 kg)   06/23/17 211 lb (95.7 kg)   01/27/17 213 lb (96.6 kg)              We " Performed the Following     ALLERGY/ASTHMA ADULT REFERRAL     CBC with platelets differential     CRP inflammation          Today's Medication Changes          These changes are accurate as of: 10/5/17  4:46 PM.  If you have any questions, ask your nurse or doctor.               Start taking these medicines.        Dose/Directions    ciprofloxacin 500 MG tablet   Commonly known as:  CIPRO   Used for:  Diverticulitis of colon        Dose:  500 mg   Take 1 tablet (500 mg) by mouth 2 times daily   Quantity:  14 tablet   Refills:  0       metroNIDAZOLE 500 MG tablet   Commonly known as:  FLAGYL   Used for:  Diverticulitis of colon        Dose:  500 mg   Take 1 tablet (500 mg) by mouth 3 times daily   Quantity:  21 tablet   Refills:  0         Stop taking these medicines if you haven't already. Please contact your care team if you have questions.     [facility] VITAMIN B-12 500 MCG Tabs   Generic drug:  cyanocobalamin           VITAMIN B-12 PO                Where to get your medicines      These medications were sent to [facility] - [address]  [address], [place] [address]     Phone:  [phone]     ciprofloxacin 500 MG tablet    metroNIDAZOLE 500 MG tablet                Primary Care Provider Office Phone # Fax #    Shawn Nolan [phone] [address]  [place] [address]        Equal Access to Services     HENNA BARBA AH: Benjamin beo [place], waaxda luqadaha, qaybta kaalmada adeegyada, gary richter. [facility] [phone].    ATENCIÓN: Si habla español, tiene a walker disposición servicios gratuitos de asistencia lingüística. Llame al [phone].    We comply with applicable federal civil rights laws and [place] laws. We do not discriminate on the basis of race, color, national origin, age, disability, sex, sexual orientation, or gender identity.            Thank you!     Thank you for choosing [facility]  for your care. Our goal is always to provide you with excellent care. Hearing back from our patients is one way we can continue to improve our services. Please take a few minutes to complete the written survey that you may receive in the mail after your visit with us. Thank you!             Your Updated Medication List - Protect others around you: Learn how to safely use, store and throw away your medicines at www.disposemymeds.org.          This list is accurate as of: 10/5/17  4:46 PM.  Always use your most recent med list.                   Brand Name Dispense Instructions for use Diagnosis    atorvastatin 20 MG tablet    LIPITOR    90 tablet    Take 1 tablet (20 mg) by mouth daily    Hyperlipidemia with target LDL less than 130       BENADRYL PO      Take 25 mg by mouth        cholecalciferol 1000 UNITS Tabs      Take 2,000 Units by mouth daily.        ciprofloxacin 500 MG tablet    CIPRO    14 tablet    Take 1 tablet (500 mg) by mouth 2 times daily    Diverticulitis of colon       co-enzyme Q-10 100 MG Caps capsule      Take  by mouth daily.        Fish Oil Oil      2-4 daily 1000mg.        metroNIDAZOLE 500 MG tablet    FLAGYL    21 tablet    Take 1 tablet (500 mg) by mouth 3 times daily    Diverticulitis of colon       thyroid 120 MG tablet    ARMOUR THYROID    90 tablet    Take 1 tablet (120 mg) by mouth daily    Acquired hypothyroidism       vitamin B complex with vitamin C Tabs tablet      Take 1 tablet by mouth daily

## 2017-10-06 LAB — CRP SERPL-MCNC: <2.9 MG/L (ref 0–8)

## 2017-10-10 ENCOUNTER — MYC MEDICAL ADVICE (OUTPATIENT)
Dept: FAMILY MEDICINE | Facility: CLINIC | Age: 62
End: 2017-10-10

## 2017-10-10 DIAGNOSIS — K57.32 DIVERTICULITIS OF COLON: Primary | ICD-10-CM

## 2017-10-12 ENCOUNTER — HOSPITAL ENCOUNTER (OUTPATIENT)
Dept: CT IMAGING | Facility: CLINIC | Age: 62
Discharge: HOME | End: 2017-10-12
Attending: INTERNAL MEDICINE | Admitting: INTERNAL MEDICINE
Payer: COMMERCIAL

## 2017-10-12 DIAGNOSIS — K57.32 DIVERTICULITIS OF COLON: ICD-10-CM

## 2017-10-12 PROCEDURE — 25000128 H RX IP 250 OP 636: Performed by: INTERNAL MEDICINE

## 2017-10-12 PROCEDURE — 74177 CT ABD & PELVIS W/CONTRAST: CPT

## 2017-10-12 PROCEDURE — 25000125 ZZHC RX 250: Performed by: INTERNAL MEDICINE

## 2017-10-12 RX ORDER — IOPAMIDOL 755 MG/ML
107 INJECTION, SOLUTION INTRAVASCULAR ONCE
Status: COMPLETED | OUTPATIENT
Start: 2017-10-12 | End: 2017-10-12

## 2017-10-12 RX ADMIN — IOPAMIDOL 107 ML: 755 INJECTION, SOLUTION INTRAVENOUS at 08:07

## 2017-10-12 RX ADMIN — SODIUM CHLORIDE 71 ML: 9 INJECTION, SOLUTION INTRAVENOUS at 08:07

## 2017-11-09 DIAGNOSIS — E78.5 HYPERLIPIDEMIA WITH TARGET LDL LESS THAN 130: ICD-10-CM

## 2017-11-10 DIAGNOSIS — G47.33 OSA (OBSTRUCTIVE SLEEP APNEA): Primary | ICD-10-CM

## 2017-11-10 RX ORDER — ATORVASTATIN CALCIUM 20 MG/1
TABLET, FILM COATED ORAL
Qty: 90 TABLET | Refills: 0 | Status: SHIPPED | OUTPATIENT
Start: 2017-11-10 | End: 2017-12-11

## 2017-11-10 NOTE — TELEPHONE ENCOUNTER
Requested Prescriptions   Pending Prescriptions Disp Refills     atorvastatin (LIPITOR) 20 MG tablet [Pharmacy Med Name: ATORVASTATIN CALCIUM 20MG TABS] 90 tablet 3     Sig: TAKE ONE TABLET BY MOUTH EVERY DAY    Statins Protocol Passed    11/10/2017  9:06 AM       Passed - LDL on file in past 12 months    Recent Labs   Lab Test  12/14/16   0726   LDL  73            Passed - No abnormal creatine kinase in past 12 months    No lab results found.         Passed - Recent or future visit with authorizing provider    Patient had office visit in the last year or has a visit in the next 30 days with authorizing provider.  See chart review.          Passed - Patient is age 18 or older       Passed - No active pregnancy on record       Passed - No positive pregnancy test in past 12 months        Prescription approved per [facility] Protocol.  Lalitha SAENZ RN

## 2017-11-16 ENCOUNTER — TRANSFERRED RECORDS (OUTPATIENT)
Dept: HEALTH INFORMATION MANAGEMENT | Facility: CLINIC | Age: 62
End: 2017-11-16

## 2017-11-27 ENCOUNTER — MYC MEDICAL ADVICE (OUTPATIENT)
Dept: FAMILY MEDICINE | Facility: CLINIC | Age: 62
End: 2017-11-27

## 2017-11-28 ENCOUNTER — OFFICE VISIT (OUTPATIENT)
Dept: FAMILY MEDICINE | Facility: CLINIC | Age: 62
End: 2017-11-28
Payer: COMMERCIAL

## 2017-11-28 ENCOUNTER — RADIANT APPOINTMENT (OUTPATIENT)
Dept: GENERAL RADIOLOGY | Facility: CLINIC | Age: 62
End: 2017-11-28
Attending: INTERNAL MEDICINE
Payer: COMMERCIAL

## 2017-11-28 ENCOUNTER — MYC MEDICAL ADVICE (OUTPATIENT)
Dept: FAMILY MEDICINE | Facility: CLINIC | Age: 62
End: 2017-11-28

## 2017-11-28 VITALS
WEIGHT: 221.7 LBS | DIASTOLIC BLOOD PRESSURE: 78 MMHG | SYSTOLIC BLOOD PRESSURE: 140 MMHG | OXYGEN SATURATION: 94 % | BODY MASS INDEX: 34.8 KG/M2 | TEMPERATURE: 97.6 F | HEIGHT: 67 IN | HEART RATE: 84 BPM

## 2017-11-28 DIAGNOSIS — R10.2 PELVIC PAIN IN FEMALE: ICD-10-CM

## 2017-11-28 DIAGNOSIS — R10.2 PELVIC PAIN IN FEMALE: Primary | ICD-10-CM

## 2017-11-28 PROBLEM — E66.01 MORBID OBESITY (H): Status: ACTIVE | Noted: 2017-11-28

## 2017-11-28 LAB
ALBUMIN UR-MCNC: NEGATIVE MG/DL
APPEARANCE UR: CLEAR
BILIRUB UR QL STRIP: NEGATIVE
COLOR UR AUTO: YELLOW
GLUCOSE UR STRIP-MCNC: NEGATIVE MG/DL
HGB UR QL STRIP: NEGATIVE
KETONES UR STRIP-MCNC: NEGATIVE MG/DL
LEUKOCYTE ESTERASE UR QL STRIP: ABNORMAL
NITRATE UR QL: NEGATIVE
PH UR STRIP: 7.5 PH (ref 5–7)
RBC #/AREA URNS AUTO: NORMAL /HPF
SOURCE: ABNORMAL
SP GR UR STRIP: 1.01 (ref 1–1.03)
UROBILINOGEN UR STRIP-ACNC: 0.2 EU/DL (ref 0.2–1)
WBC #/AREA URNS AUTO: NORMAL /HPF

## 2017-11-28 PROCEDURE — 99213 OFFICE O/P EST LOW 20 MIN: CPT | Performed by: INTERNAL MEDICINE

## 2017-11-28 PROCEDURE — 72170 X-RAY EXAM OF PELVIS: CPT

## 2017-11-28 PROCEDURE — 81001 URINALYSIS AUTO W/SCOPE: CPT | Performed by: INTERNAL MEDICINE

## 2017-11-28 RX ORDER — NAPROXEN 500 MG/1
500 TABLET ORAL 2 TIMES DAILY PRN
Qty: 30 TABLET | Refills: 1 | COMMUNITY
Start: 2017-11-28 | End: 2018-06-04

## 2017-11-28 RX ORDER — MULTIVITAMIN WITH IRON
1 TABLET ORAL DAILY
Qty: 30 TABLET | COMMUNITY
End: 2024-01-31

## 2017-11-28 NOTE — PROGRESS NOTES
SUBJECTIVE:   Ambreen Mora is a 62 year old female who presents to clinic today for the following health issues:      Pubic pain: seen in October, prior to [place] trip   She was gone 2 weeks   She walked a lot too there     Pain is more with sneezing and cough  nsaids help  She has picked heavy material for bathroom in Sept  CT abdomen was Negative in October      Duration: since Sept    Description  Pain above pubic bone    Intensity:  moderate    Accompanying signs and symptoms:  Fever/chills: no   Flank pain no   Nausea and vomiting: no   Vaginal symptoms: none  Abdominal/Pelvic Pain: YES    History  History of frequent UTI's: no   History of kidney stones: no   Sexually Active: not much  Possibility of pregnancy: No    Precipitating or alleviating factors: None    Therapies tried and outcome: ultrasound   Outcome: normal            Problem list and histories reviewed & adjusted, as indicated.  Additional history: as documented    Patient Active Problem List   Diagnosis     JASMYNE (obstructive sleep apnea)     Hyperlipidemia with target LDL less than 130     External bleeding hemorrhoids     Esophageal dysmotility     Obesity     Vitamin D deficiency disease     Acquired hypothyroidism     Left shoulder pain     Past Surgical History:   Procedure Laterality Date     BLADDER SURGERY  19    honeycomb     COLONOSCOPY  2005     HYSTERECTOMY, PAP NO LONGER INDICATED  1980s       Social History   Substance Use Topics     Smoking status: Never Smoker     Smokeless tobacco: Never Used     Alcohol use No     Family History   Problem Relation Age of Onset     Hearing Loss Mother      mennier's disease     Circulatory Brother      valve issues     Brain Tumor Brother          Current Outpatient Prescriptions   Medication Sig Dispense Refill     magnesium 250 MG tablet Take 1 tablet by mouth daily 30 tablet      atorvastatin (LIPITOR) 20 MG tablet TAKE ONE TABLET BY MOUTH EVERY DAY 90 tablet 0     vitamin B complex with  "vitamin C (VITAMIN  B COMPLEX) TABS tablet Take 1 tablet by mouth daily       thyroid (ARMOUR THYROID) 120 MG tablet Take 1 tablet (120 mg) by mouth daily 90 tablet 3     DiphenhydrAMINE HCl (BENADRYL PO) Take 25 mg by mouth       co-enzyme Q-10 (COQ10) 100 MG CAPS Take  by mouth daily.       cholecalciferol 1000 UNITS TABS Take 2,000 Units by mouth daily.       Fish Oil OIL 2-4 daily 1000mg.            Reviewed and updated as needed this visit by clinical staffTobacco  Allergies  Meds  Problems  Soc Hx      Reviewed and updated as needed this visit by Provider  Allergies  Meds  Problems         ROS:  Constitutional, HEENT, cardiovascular, pulmonary, GI, , musculoskeletal, neuro, skin, endocrine and psych systems are negative, except as otherwise noted.      OBJECTIVE:   /78 (BP Location: Left arm, Patient Position: Chair, Cuff Size: Adult Large)  Pulse 84  Temp 97.6  F (36.4  C) (Oral)  Ht 5' 6.5\" (1.689 m)  Wt 221 lb 11.2 oz (100.6 kg)  SpO2 94%  Breastfeeding? No  BMI 35.25 kg/m2  Body mass index is 35.25 kg/(m^2).  ABDOMEN: soft, nontender, without hepatosplenomegaly or masses and bowel sounds normal  MS: extremities normal- no gross deformities noted      Tender on symhysis pubis  ASSESSMENT/PLAN:           Ambreen was seen today for urinary problem.    Diagnoses and all orders for this visit:    Pelvic pain in female  -     *UA reflex to Microscopic and Culture ([place] and [facility] (except [place] and [place])  -     [facility] PT, HAND, AND CHIROPRACTIC REFERRAL  -     Cancel: XR Pelvis Port 1/2 Views; Future  -     Urine Microscopic  -     XR Pelvis 1/2 Views; Future    sounds like symphysis pubic separation .  CT abdomen and pelvis is Negative   CRP was Negative  This happened after heavy lifiting        Shawn Nolan MD  [facility]  "

## 2017-11-28 NOTE — MR AVS SNAPSHOT
After Visit Summary   11/28/2017    Ambreen Mora    MRN: [id number]           Patient Information     Date Of Birth          1955        Visit Information        Provider Department      11/28/2017 1:45 PM Shawn Nolan MD [facility]        Today's Diagnoses     Pelvic pain in female    -  1       Follow-ups after your visit        Additional Services     [facility] PT, HAND, AND CHIROPRACTIC REFERRAL       **This order will print in the [facility] Scheduling Office**    Physical Therapy, Hand Therapy and Chiropractic Care are available through:    *[facility]  *[facility]  *[facility]    Call one number to schedule at any of the above locations: [phone].    Your provider has referred you to: Physical Therapy at [facility] or [facility]    Indication/Reason for Referral: Women's Health (Please Complete Special Programs SmartList)- Pelvic pain Pubic area  Onset of Illness: 2 months  Therapy Orders: Evaluate and Treat  Special Programs: None  Special Request: None    Leon Quintero      Additional Comments for the Therapist or Chiropractor:     Please be aware that coverage of these services is subject to the terms and limitations of your health insurance plan.  Call member services at your health plan with any benefit or coverage questions.      Please bring the following to your appointment:    *Your personal calendar for scheduling future appointments  *Comfortable clothing                  Your next 10 appointments already scheduled     Dec 04, 2017  8:00 AM CST   LAB with CS LAB   [facility] ([facility])    [address] [address] [phone]           Please do not eat 10-12 hours before your appointment if you are coming in fasting for labs on lipids, cholesterol, or glucose (sugar). This does not apply to pregnant women. Water, hot tea and black coffee (with nothing added) are okay. Do  "not drink other fluids, diet soda or chew gum.            Dec 11, 2017  3:30 PM CST   PHYSICAL with Shawn Nolan MD   [facility]    [address]  [facility] [address] [phone]              Future tests that were ordered for you today     Open Future Orders        Priority Expected Expires Ordered    XR Pelvis Port 1/2 Views Routine 11/28/2017 11/28/2018 11/28/2017            Who to contact     If you have questions or need follow up information about today's clinic visit or your schedule please contact [facility] directly at [phone].  Normal or non-critical lab and imaging results will be communicated to you by Weixinhaihart, letter or phone within 4 business days after the clinic has received the results. If you do not hear from us within 7 days, please contact the clinic through Weixinhaihart or phone. If you have a critical or abnormal lab result, we will notify you by phone as soon as possible.  Submit refill requests through [employer] Social Platform or call your pharmacy and they will forward the refill request to us. Please allow 3 business days for your refill to be completed.          Additional Information About Your Visit        Weixinhaihart Information     [employer] Platform gives you secure access to your electronic health record. If you see a primary care provider, you can also send messages to your care team and make appointments. If you have questions, please call your primary care clinic.  If you do not have a primary care provider, please call [phone] and they will assist you.        Care EveryWhere ID     This is your Care EveryWhere ID. This could be used by other organizations to access your [place] medical records  [id number]        Your Vitals Were     Pulse Temperature Height Pulse Oximetry Breastfeeding? BMI (Body Mass Index)    84 97.6  F (36.4  C) (Oral) 5' 6.5\" (1.689 m) 94% No 35.25 kg/m2       Blood Pressure from Last 3 Encounters:   11/28/17 140/78 "   10/05/17 142/82   06/23/17 125/74    Weight from Last 3 Encounters:   11/28/17 221 lb 11.2 oz (100.6 kg)   10/05/17 218 lb (98.9 kg)   06/23/17 211 lb (95.7 kg)              We Performed the Following     *UA reflex to Microscopic and Culture ([place] and [facility] (except [place] and [place])     [facility] PT, HAND, AND CHIROPRACTIC REFERRAL          Today's Medication Changes          These changes are accurate as of: 11/28/17  2:07 PM.  If you have any questions, ask your nurse or doctor.               Stop taking these medicines if you haven't already. Please contact your care team if you have questions.     ciprofloxacin 500 MG tablet   Commonly known as:  CIPRO   Stopped by:  Shawn Nolan MD           metroNIDAZOLE 500 MG tablet   Commonly known as:  FLAGYL   Stopped by:  Shawn Nolan MD                    Primary Care Provider Office Phone # Fax #    Shawn Nolan [phone] [address] [address]        Equal Access to Services     [facility]: Hadii jeremiah ku hadasho [place], waaxda luqadaha, qaybta kaalmada adesamuel, gary clemons . So [facility] [phone].    ATENCIÓN: Si habla español, tiene a walker disposición servicios gratuitos de asistencia lingüística. Anoop al [phone].    We comply with applicable federal civil rights laws and [place] laws. We do not discriminate on the basis of race, color, national origin, age, disability, sex, sexual orientation, or gender identity.            Thank you!     Thank you for choosing [facility]  for your care. Our goal is always to provide you with excellent care. Hearing back from our patients is one way we can continue to improve our services. Please take a few minutes to complete the written survey that you may receive in the mail after your visit with us. Thank you!             Your Updated Medication List - Protect others around you: Learn how to safely use, store and  throw away your medicines at www.disposemymeds.org.          This list is accurate as of: 11/28/17  2:07 PM.  Always use your most recent med list.                   Brand Name Dispense Instructions for use Diagnosis    atorvastatin 20 MG tablet    LIPITOR    90 tablet    TAKE ONE TABLET BY MOUTH EVERY DAY    Hyperlipidemia with target LDL less than 130       BENADRYL PO      Take 25 mg by mouth        cholecalciferol 1000 UNITS Tabs      Take 2,000 Units by mouth daily.        co-enzyme Q-10 100 MG Caps capsule      Take  by mouth daily.        Fish Oil Oil      2-4 daily 1000mg.        magnesium 250 MG tablet     30 tablet    Take 1 tablet by mouth daily        thyroid 120 MG tablet    ARMOUR THYROID    90 tablet    Take 1 tablet (120 mg) by mouth daily    Acquired hypothyroidism       vitamin B complex with vitamin C Tabs tablet      Take 1 tablet by mouth daily

## 2017-11-28 NOTE — NURSING NOTE
"Chief Complaint   Patient presents with     Urinary Problem       Initial /78 (BP Location: Left arm, Patient Position: Chair, Cuff Size: Adult Large)  Pulse 84  Temp 97.6  F (36.4  C) (Oral)  Ht 5' 6.5\" (1.689 m)  Wt 221 lb 11.2 oz (100.6 kg)  SpO2 94%  Breastfeeding? No  BMI 35.25 kg/m2 Estimated body mass index is 35.25 kg/(m^2) as calculated from the following:    Height as of this encounter: 5' 6.5\" (1.689 m).    Weight as of this encounter: 221 lb 11.2 oz (100.6 kg).  Medication Reconciliation: complete.  Enedina Acuña CMA    "

## 2017-12-04 DIAGNOSIS — E78.5 HYPERLIPIDEMIA WITH TARGET LDL LESS THAN 130: ICD-10-CM

## 2017-12-04 DIAGNOSIS — E03.9 ACQUIRED HYPOTHYROIDISM: ICD-10-CM

## 2017-12-04 DIAGNOSIS — G47.33 OSA (OBSTRUCTIVE SLEEP APNEA): ICD-10-CM

## 2017-12-04 DIAGNOSIS — E55.9 VITAMIN D DEFICIENCY DISEASE: ICD-10-CM

## 2017-12-04 LAB
ANION GAP SERPL CALCULATED.3IONS-SCNC: 6 MMOL/L (ref 3–14)
BUN SERPL-MCNC: 12 MG/DL (ref 7–30)
CALCIUM SERPL-MCNC: 8.9 MG/DL (ref 8.5–10.1)
CHLORIDE SERPL-SCNC: 108 MMOL/L (ref 94–109)
CHOLEST SERPL-MCNC: 151 MG/DL
CO2 SERPL-SCNC: 27 MMOL/L (ref 20–32)
CREAT SERPL-MCNC: 0.69 MG/DL (ref 0.52–1.04)
DEPRECATED CALCIDIOL+CALCIFEROL SERPL-MC: 58 UG/L (ref 20–75)
ERYTHROCYTE [DISTWIDTH] IN BLOOD BY AUTOMATED COUNT: 13.5 % (ref 10–15)
GFR SERPL CREATININE-BSD FRML MDRD: 87 ML/MIN/1.7M2
GLUCOSE SERPL-MCNC: 88 MG/DL (ref 70–99)
HCT VFR BLD AUTO: 39.1 % (ref 35–47)
HDLC SERPL-MCNC: 44 MG/DL
HGB BLD-MCNC: 12.8 G/DL (ref 11.7–15.7)
LDLC SERPL CALC-MCNC: 72 MG/DL
MCH RBC QN AUTO: 28.3 PG (ref 26.5–33)
MCHC RBC AUTO-ENTMCNC: 32.7 G/DL (ref 31.5–36.5)
MCV RBC AUTO: 86 FL (ref 78–100)
NONHDLC SERPL-MCNC: 107 MG/DL
PLATELET # BLD AUTO: 214 10E9/L (ref 150–450)
POTASSIUM SERPL-SCNC: 3.8 MMOL/L (ref 3.4–5.3)
RBC # BLD AUTO: 4.53 10E12/L (ref 3.8–5.2)
SODIUM SERPL-SCNC: 141 MMOL/L (ref 133–144)
T4 FREE SERPL-MCNC: 0.71 NG/DL (ref 0.76–1.46)
TRIGL SERPL-MCNC: 173 MG/DL
TSH SERPL DL<=0.005 MIU/L-ACNC: 0.13 MU/L (ref 0.4–4)
WBC # BLD AUTO: 5.5 10E9/L (ref 4–11)

## 2017-12-04 PROCEDURE — 80048 BASIC METABOLIC PNL TOTAL CA: CPT | Performed by: INTERNAL MEDICINE

## 2017-12-04 PROCEDURE — 82306 VITAMIN D 25 HYDROXY: CPT | Performed by: INTERNAL MEDICINE

## 2017-12-04 PROCEDURE — 84439 ASSAY OF FREE THYROXINE: CPT | Performed by: INTERNAL MEDICINE

## 2017-12-04 PROCEDURE — 85027 COMPLETE CBC AUTOMATED: CPT | Performed by: INTERNAL MEDICINE

## 2017-12-04 PROCEDURE — 84443 ASSAY THYROID STIM HORMONE: CPT | Performed by: INTERNAL MEDICINE

## 2017-12-04 PROCEDURE — 36415 COLL VENOUS BLD VENIPUNCTURE: CPT | Performed by: INTERNAL MEDICINE

## 2017-12-04 PROCEDURE — 80061 LIPID PANEL: CPT | Performed by: INTERNAL MEDICINE

## 2017-12-05 RX ORDER — THYROID 30 MG/1
30 TABLET ORAL DAILY
Qty: 90 TABLET | Refills: 3 | Status: SHIPPED | OUTPATIENT
Start: 2017-12-05 | End: 2018-06-04

## 2017-12-11 ENCOUNTER — OFFICE VISIT (OUTPATIENT)
Dept: FAMILY MEDICINE | Facility: CLINIC | Age: 62
End: 2017-12-11
Payer: COMMERCIAL

## 2017-12-11 VITALS
DIASTOLIC BLOOD PRESSURE: 82 MMHG | BODY MASS INDEX: 34.84 KG/M2 | HEART RATE: 78 BPM | TEMPERATURE: 97.4 F | HEIGHT: 67 IN | WEIGHT: 222 LBS | OXYGEN SATURATION: 94 % | SYSTOLIC BLOOD PRESSURE: 132 MMHG

## 2017-12-11 DIAGNOSIS — G47.33 OSA (OBSTRUCTIVE SLEEP APNEA): ICD-10-CM

## 2017-12-11 DIAGNOSIS — S32.89XG: ICD-10-CM

## 2017-12-11 DIAGNOSIS — E66.01 MORBID OBESITY (H): ICD-10-CM

## 2017-12-11 DIAGNOSIS — E78.5 HYPERLIPIDEMIA WITH TARGET LDL LESS THAN 130: ICD-10-CM

## 2017-12-11 DIAGNOSIS — E03.9 ACQUIRED HYPOTHYROIDISM: ICD-10-CM

## 2017-12-11 DIAGNOSIS — K22.4 ESOPHAGEAL DYSMOTILITY: ICD-10-CM

## 2017-12-11 DIAGNOSIS — Z00.00 ROUTINE GENERAL MEDICAL EXAMINATION AT A HEALTH CARE FACILITY: Primary | ICD-10-CM

## 2017-12-11 PROCEDURE — 99396 PREV VISIT EST AGE 40-64: CPT | Performed by: INTERNAL MEDICINE

## 2017-12-11 RX ORDER — ATORVASTATIN CALCIUM 20 MG/1
20 TABLET, FILM COATED ORAL DAILY
Qty: 90 TABLET | Refills: 3 | Status: SHIPPED | OUTPATIENT
Start: 2017-12-11 | End: 2018-12-16
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
## 2017-12-11 NOTE — MR AVS SNAPSHOT
After Visit Summary   12/11/2017    Ambreen Mora    MRN: [id number]           Patient Information     Date Of Birth          1955        Visit Information        Provider Department      12/11/2017 3:30 PM Shawn Nolan MD [facility]        Today's Diagnoses     Routine general medical examination at a health care facility    -  1    Closed fracture of other part of pelvis with delayed healing, subsequent encounter        Morbid obesity (H)        Esophageal dysmotility        JASMYNE (obstructive sleep apnea)        Acquired hypothyroidism        Hyperlipidemia with target LDL less than 130          Care Instructions      Preventive Health Recommendations  Female Ages 50 - 64    Yearly exam: See your health care provider every year in order to  o Review health changes.   o Discuss preventive care.    o Review your medicines if your doctor has prescribed any.      Get a Pap test every three years (unless you have an abnormal result and your provider advises testing more often).    If you get Pap tests with HPV test, you only need to test every 5 years, unless you have an abnormal result.     You do not need a Pap test if your uterus was removed (hysterectomy) and you have not had cancer.    You should be tested each year for STDs (sexually transmitted diseases) if you're at risk.     Have a mammogram every 1 to 2 years.    Have a colonoscopy at age 50, or have a yearly FIT test (stool test). These exams screen for colon cancer.      Have a cholesterol test every 5 years, or more often if advised.    Have a diabetes test (fasting glucose) every three years. If you are at risk for diabetes, you should have this test more often.     If you are at risk for osteoporosis (brittle bone disease), think about having a bone density scan (DEXA).    Shots: Get a flu shot each year. Get a tetanus shot every 10 years.    Nutrition:     Eat at least 5 servings of fruits and vegetables each day.    Eat  "whole-grain bread, whole-wheat pasta and brown rice instead of white grains and rice.    Talk to your provider about Calcium and Vitamin D.     Lifestyle    Exercise at least 150 minutes a week (30 minutes a day, 5 days a week). This will help you control your weight and prevent disease.    Limit alcohol to one drink per day.    No smoking.     Wear sunscreen to prevent skin cancer.     See your dentist every six months for an exam and cleaning.    See your eye doctor every 1 to 2 years.            Follow-ups after your visit        Who to contact     If you have questions or need follow up information about today's clinic visit or your schedule please contact [facility] directly at [phone].  Normal or non-critical lab and imaging results will be communicated to you by FanDuelhart, letter or phone within 4 business days after the clinic has received the results. If you do not hear from us within 7 days, please contact the clinic through DRESSBOOMt or phone. If you have a critical or abnormal lab result, we will notify you by phone as soon as possible.  Submit refill requests through Urban Gentleman or call your pharmacy and they will forward the refill request to us. Please allow 3 business days for your refill to be completed.          Additional Information About Your Visit        Urban Gentleman Information     Urban Gentleman gives you secure access to your electronic health record. If you see a primary care provider, you can also send messages to your care team and make appointments. If you have questions, please call your primary care clinic.  If you do not have a primary care provider, please call [phone] and they will assist you.        Care EveryWhere ID     This is your Care EveryWhere ID. This could be used by other organizations to access your [place] medical records  [id number]        Your Vitals Were     Pulse Temperature Height Pulse Oximetry BMI (Body Mass Index)       78 97.4  F (36.3  C) (Oral) 5' 6.5\" " (1.689 m) 94% 35.29 kg/m2        Blood Pressure from Last 3 Encounters:   12/11/17 132/82   11/28/17 140/78   10/05/17 142/82    Weight from Last 3 Encounters:   12/11/17 222 lb (100.7 kg)   11/28/17 221 lb 11.2 oz (100.6 kg)   10/05/17 218 lb (98.9 kg)              Today, you had the following     No orders found for display         Today's Medication Changes          These changes are accurate as of: 12/11/17  3:53 PM.  If you have any questions, ask your nurse or doctor.               These medicines have changed or have updated prescriptions.        Dose/Directions    atorvastatin 20 MG tablet   Commonly known as:  LIPITOR   This may have changed:  See the new instructions.   Used for:  Hyperlipidemia with target LDL less than 130   Changed by:  Shawn Nolan MD        Dose:  20 mg   Take 1 tablet (20 mg) by mouth daily   Quantity:  90 tablet   Refills:  3            Where to get your medicines      These medications were sent to [facility] [place] - [address]  [address], [facility] [address]     Phone:  [phone]     atorvastatin 20 MG tablet                Primary Care Provider Office Phone # Fax #    Shawn Nolan [phone] [address]  [facility] [address]        Equal Access to Services     HENNA BARBA AH: Hadii jeremiah williamson hadmarcio Sokodak, waaxda luqadaha, qaybta kaalgary valenzuela . So [facility] [phone].    ATENCIÓN: Si habla español, tiene a walker disposición servicios gratuitos de asistencia lingüística. Adamame al [phone].    We comply with applicable federal civil rights laws and [place] laws. We do not discriminate on the basis of race, color, national origin, age, disability, sex, sexual orientation, or gender identity.            Thank you!     Thank you for choosing [facility]  for your care. Our goal is always to provide you with excellent care. Hearing back from our  patients is one way we can continue to improve our services. Please take a few minutes to complete the written survey that you may receive in the mail after your visit with us. Thank you!             Your Updated Medication List - Protect others around you: Learn how to safely use, store and throw away your medicines at www.disposemymeds.org.          This list is accurate as of: 12/11/17  3:53 PM.  Always use your most recent med list.                   Brand Name Dispense Instructions for use Diagnosis    atorvastatin 20 MG tablet    LIPITOR    90 tablet    Take 1 tablet (20 mg) by mouth daily    Hyperlipidemia with target LDL less than 130       BENADRYL PO      Take 25 mg by mouth        cholecalciferol 1000 UNITS Tabs      Take 2,000 Units by mouth daily.        co-enzyme Q-10 100 MG Caps capsule      Take  by mouth daily.        Fish Oil Oil      2-4 daily 1000mg.        magnesium 250 MG tablet     30 tablet    Take 1 tablet by mouth daily        NAPROSYN 500 MG tablet   Generic drug:  naproxen     30 tablet    Take 1 tablet (500 mg) by mouth 2 times daily as needed for moderate pain        * thyroid 120 MG tablet    ARMOUR THYROID    90 tablet    Take 1 tablet (120 mg) by mouth daily    Acquired hypothyroidism       * thyroid 30 MG tablet    ARMOUR THYROID    90 tablet    Take 1 tablet (30 mg) by mouth daily    Acquired hypothyroidism       vitamin B complex with vitamin C Tabs tablet      Take 1 tablet by mouth daily        * Notice:  This list has 2 medication(s) that are the same as other medications prescribed for you. Read the directions carefully, and ask your doctor or other care provider to review them with you.

## 2017-12-11 NOTE — NURSING NOTE
"Chief Complaint   Patient presents with     Physical     Not fasting       Initial /82 (BP Location: Left arm, Cuff Size: Adult Large)  Pulse 78  Temp 97.4  F (36.3  C) (Oral)  Ht 5' 6.5\" (1.689 m)  Wt 222 lb (100.7 kg)  SpO2 94%  BMI 35.29 kg/m2 Estimated body mass index is 35.29 kg/(m^2) as calculated from the following:    Height as of this encounter: 5' 6.5\" (1.689 m).    Weight as of this encounter: 222 lb (100.7 kg).  Medication Reconciliation: complete       Lolita Jean-Baptiste CMA      "
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
## 2017-12-27 ENCOUNTER — TRANSFERRED RECORDS (OUTPATIENT)
Dept: HEALTH INFORMATION MANAGEMENT | Facility: CLINIC | Age: 62
End: 2017-12-27

## 2018-01-03 ENCOUNTER — TRANSFERRED RECORDS (OUTPATIENT)
Dept: HEALTH INFORMATION MANAGEMENT | Facility: CLINIC | Age: 63
End: 2018-01-03

## 2018-02-08 DIAGNOSIS — E03.9 ACQUIRED HYPOTHYROIDISM: ICD-10-CM

## 2018-02-08 LAB
T4 FREE SERPL-MCNC: 0.9 NG/DL (ref 0.76–1.46)
TSH SERPL DL<=0.005 MIU/L-ACNC: 0.02 MU/L (ref 0.4–4)

## 2018-02-08 PROCEDURE — 36415 COLL VENOUS BLD VENIPUNCTURE: CPT | Performed by: INTERNAL MEDICINE

## 2018-02-08 PROCEDURE — 84443 ASSAY THYROID STIM HORMONE: CPT | Performed by: INTERNAL MEDICINE

## 2018-02-08 PROCEDURE — 84439 ASSAY OF FREE THYROXINE: CPT | Performed by: INTERNAL MEDICINE

## 2018-05-21 DIAGNOSIS — E03.9 ACQUIRED HYPOTHYROIDISM: ICD-10-CM

## 2018-05-21 LAB
T4 FREE SERPL-MCNC: 0.73 NG/DL (ref 0.76–1.46)
TSH SERPL DL<=0.005 MIU/L-ACNC: 0.05 MU/L (ref 0.4–4)

## 2018-05-21 PROCEDURE — 36415 COLL VENOUS BLD VENIPUNCTURE: CPT | Performed by: INTERNAL MEDICINE

## 2018-05-21 PROCEDURE — 84439 ASSAY OF FREE THYROXINE: CPT | Performed by: INTERNAL MEDICINE

## 2018-05-21 PROCEDURE — 84443 ASSAY THYROID STIM HORMONE: CPT | Performed by: INTERNAL MEDICINE

## 2018-05-22 ENCOUNTER — TELEPHONE (OUTPATIENT)
Dept: FAMILY MEDICINE | Facility: CLINIC | Age: 63
End: 2018-05-22

## 2018-05-22 NOTE — TELEPHONE ENCOUNTER
Detailed VM left for patient informing her of lab results/advising her they are viewable via Panonohart    Routing to TCs to contact patient to help schedule a new pt appointment with Dr. Levi SAENZ RN

## 2018-05-23 ENCOUNTER — MYC MEDICAL ADVICE (OUTPATIENT)
Dept: FAMILY MEDICINE | Facility: CLINIC | Age: 63
End: 2018-05-23

## 2018-06-04 ENCOUNTER — OFFICE VISIT (OUTPATIENT)
Dept: ENDOCRINOLOGY | Facility: CLINIC | Age: 63
End: 2018-06-04
Payer: COMMERCIAL

## 2018-06-04 VITALS
HEART RATE: 86 BPM | DIASTOLIC BLOOD PRESSURE: 83 MMHG | BODY MASS INDEX: 36.57 KG/M2 | HEIGHT: 67 IN | SYSTOLIC BLOOD PRESSURE: 136 MMHG | WEIGHT: 233 LBS

## 2018-06-04 DIAGNOSIS — E03.9 ACQUIRED HYPOTHYROIDISM: Primary | ICD-10-CM

## 2018-06-04 PROCEDURE — 99203 OFFICE O/P NEW LOW 30 MIN: CPT | Performed by: INTERNAL MEDICINE

## 2018-06-04 RX ORDER — LEVOTHYROXINE SODIUM 50 UG/1
50 TABLET ORAL DAILY
Qty: 90 TABLET | Refills: 1 | Status: SHIPPED | OUTPATIENT
Start: 2018-06-04 | End: 2018-10-04

## 2018-06-04 RX ORDER — THYROID 120 MG/1
120 TABLET ORAL DAILY
Qty: 90 TABLET | Refills: 3 | Status: SHIPPED | OUTPATIENT
Start: 2018-06-04 | End: 2018-10-04
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
## 2018-06-04 NOTE — MR AVS SNAPSHOT
After Visit Summary   6/4/2018    Ambreen Mora    MRN: [id number]           Patient Information     Date Of Birth          1955        Visit Information        Provider Department      6/4/2018 1:00 PM Rubio Sims MD [facility]        Today's Diagnoses     Acquired hypothyroidism    -  1       Follow-ups after your visit        Follow-up notes from your care team     Return in about 6 weeks (around 7/16/2018).      Your next 10 appointments already scheduled     Jul 09, 2018  7:45 AM CDT   LAB with [facility]   [facility]    [address] [place] [id number] [phone]           Please do not eat 10-12 hours before your appointment if you are coming in fasting for labs on lipids, cholesterol, or glucose (sugar). This does not apply to pregnant women. Water, hot tea and black coffee (with nothing added) are okay. Do not drink other fluids, diet soda or chew gum.            Jul 16, 2018  2:00 PM CDT   [facility] Visit with [facility]   [facility]    [address] [place] [id number] [phone]              Future tests that were ordered for you today     Open Future Orders        Priority Expected Expires Ordered    T4 free Routine  6/4/2019 6/4/2018    T3 Free Routine  6/4/2019 6/4/2018    TSH Routine  6/4/2019 6/4/2018    Thyroid peroxidase antibody Routine  6/4/2019 6/4/2018            Who to contact     If you have questions or need follow up information about today's clinic visit or your schedule please contact [facility] directly at [phone].  Normal or non-critical lab and imaging results will be communicated to you by MyChart, letter or phone within 4 business days after the clinic has received the results. If you do not hear from us within 7 days, please contact the clinic through MyChart or phone. If you have a critical or  "abnormal lab result, we will notify you by phone as soon as possible.  Submit refill requests through [employer] or call your pharmacy and they will forward the refill request to us. Please allow 3 business days for your refill to be completed.          Additional Information About Your Visit        [employer] Information     [employer] gives you secure access to your electronic health record. If you see a primary care provider, you can also send messages to your care team and make appointments. If you have questions, please call your primary care clinic.  If you do not have a primary care provider, please call [phone] and they will assist you.        Care EveryWhere ID     This is your Care EveryWhere ID. This could be used by other organizations to access your [facility] medical records  [id number]        Your Vitals Were     Pulse Height BMI (Body Mass Index)             86 5' 6.5\" (1.689 m) 37.04 kg/m2          Blood Pressure from Last 3 Encounters:   06/04/18 136/83   12/11/17 132/82   11/28/17 140/78    Weight from Last 3 Encounters:   06/04/18 233 lb (105.7 kg)   12/11/17 222 lb (100.7 kg)   11/28/17 221 lb 11.2 oz (100.6 kg)                 Today's Medication Changes          These changes are accurate as of 6/4/18  9:42 PM.  If you have any questions, ask your nurse or doctor.               Start taking these medicines.        Dose/Directions    levothyroxine 50 MCG tablet   Commonly known as:  SYNTHROID/LEVOTHROID   Used for:  Acquired hypothyroidism   Started by:  Rubio Sims MD        Dose:  50 mcg   Take 1 tablet (50 mcg) by mouth daily (take along with [place] thyroid 120 mg tablet), as directed   Quantity:  90 tablet   Refills:  1            Where to get your medicines      These medications were sent to [facility] - [address]  [address], [place] [address]     Phone:  [phone]     levothyroxine 50 MCG tablet    thyroid 120 MG tablet             "    Primary Care Provider Office Phone # Fax #    Bhtorres Nolan [phone] [address] [facility] [address]  [facility] [address]        Equal Access to Services     BENJI BARBA : Benjamin williamson indianao Sodenali, waduongda luqadaha, qaybta kaalmada jennifer, gary zulemain hayaaroderick weekssridevi salasrae richter. So [facility] [phone].    ATENCIÓN: Si habla español, tiene a walker disposición servicios gratuitos de asistencia lingüística. Llame al [phone].    We comply with applicable federal civil rights laws and [place] laws. We do not discriminate on the basis of race, color, national origin, age, disability, sex, sexual orientation, or gender identity.            Thank you!     Thank you for choosing [facility]  for your care. Our goal is always to provide you with excellent care. Hearing back from our patients is one way we can continue to improve our services. Please take a few minutes to complete the written survey that you may receive in the mail after your visit with us. Thank you!             Your Updated Medication List - Protect others around you: Learn how to safely use, store and throw away your medicines at www.disposemymeds.org.          This list is accurate as of 6/4/18  9:42 PM.  Always use your most recent med list.                   Brand Name Dispense Instructions for use Diagnosis    atorvastatin 20 MG tablet    LIPITOR    90 tablet    Take 1 tablet (20 mg) by mouth daily    Hyperlipidemia with target LDL less than 130       BENADRYL PO      Take 25 mg by mouth        cholecalciferol 1000 units Tabs      Take 2,000 Units by mouth daily.        co-enzyme Q-10 100 MG Caps capsule      Take  by mouth daily.        Fish Oil Oil      2-4 daily 1000mg.        levothyroxine 50 MCG tablet    SYNTHROID/LEVOTHROID    90 tablet    Take 1 tablet (50 mcg) by mouth daily (take along with [place] thyroid 120 mg tablet), as directed    Acquired hypothyroidism       magnesium 250 MG tablet     30 tablet     Take 1 tablet by mouth daily        thyroid 120 MG tablet    [place] THYROID    90 tablet    Take 1 tablet (120 mg) by mouth daily    Acquired hypothyroidism       vitamin B complex with vitamin C Tabs tablet      Take 1 tablet by mouth daily

## 2018-07-09 DIAGNOSIS — E03.9 ACQUIRED HYPOTHYROIDISM: ICD-10-CM

## 2018-07-09 LAB
T3FREE SERPL-MCNC: 3.6 PG/ML (ref 2.3–4.2)
T4 FREE SERPL-MCNC: 1.12 NG/DL (ref 0.76–1.46)
TSH SERPL DL<=0.005 MIU/L-ACNC: 0.01 MU/L (ref 0.4–4)

## 2018-07-09 PROCEDURE — 36415 COLL VENOUS BLD VENIPUNCTURE: CPT | Performed by: INTERNAL MEDICINE

## 2018-07-09 PROCEDURE — 86376 MICROSOMAL ANTIBODY EACH: CPT | Performed by: INTERNAL MEDICINE

## 2018-07-09 PROCEDURE — 84439 ASSAY OF FREE THYROXINE: CPT | Performed by: INTERNAL MEDICINE

## 2018-07-09 PROCEDURE — 84481 FREE ASSAY (FT-3): CPT | Performed by: INTERNAL MEDICINE

## 2018-07-09 PROCEDURE — 84443 ASSAY THYROID STIM HORMONE: CPT | Performed by: INTERNAL MEDICINE

## 2018-07-10 LAB — THYROPEROXIDASE AB SERPL-ACNC: 20 IU/ML

## 2018-07-16 ENCOUNTER — OFFICE VISIT (OUTPATIENT)
Dept: ENDOCRINOLOGY | Facility: CLINIC | Age: 63
End: 2018-07-16
Payer: COMMERCIAL

## 2018-07-16 VITALS
HEIGHT: 67 IN | HEART RATE: 76 BPM | BODY MASS INDEX: 36.88 KG/M2 | SYSTOLIC BLOOD PRESSURE: 137 MMHG | DIASTOLIC BLOOD PRESSURE: 87 MMHG | WEIGHT: 235 LBS

## 2018-07-16 DIAGNOSIS — E03.9 ACQUIRED HYPOTHYROIDISM: Primary | ICD-10-CM

## 2018-07-16 PROCEDURE — 99214 OFFICE O/P EST MOD 30 MIN: CPT | Performed by: INTERNAL MEDICINE

## 2018-07-16 NOTE — MR AVS SNAPSHOT
After Visit Summary   7/16/2018    Ambreen Mora    MRN: [id number]           Patient Information     Date Of Birth          1955        Visit Information        Provider Department      7/16/2018 2:00 PM Rubio Sims MD [facility]        Today's Diagnoses     Acquired hypothyroidism    -  1       Follow-ups after your visit        Follow-up notes from your care team     Return in about 4 months (around 11/16/2018).      Future tests that were ordered for you today     Open Future Orders        Priority Expected Expires Ordered    T3 Free Routine  7/16/2019 7/16/2018    T4 free Routine  7/16/2019 7/16/2018    TSH Routine  7/16/2019 7/16/2018            Who to contact     If you have questions or need follow up information about today's clinic visit or your schedule please contact [facility] directly at [phone].  Normal or non-critical lab and imaging results will be communicated to you by MyChart, letter or phone within 4 business days after the clinic has received the results. If you do not hear from us within 7 days, please contact the clinic through [employer] or phone. If you have a critical or abnormal lab result, we will notify you by phone as soon as possible.  Submit refill requests through [employer] or call your pharmacy and they will forward the refill request to us. Please allow 3 business days for your refill to be completed.          Additional Information About Your Visit        MyChart Information     [employer] gives you secure access to your electronic health record. If you see a primary care provider, you can also send messages to your care team and make appointments. If you have questions, please call your primary care clinic.  If you do not have a primary care provider, please call [phone] and they will assist you.        Care EveryWhere ID     This is your Care EveryWhere ID. This could be used by other organizations to access your [facility]  "medical records  [id number]        Your Vitals Were     Pulse Height BMI (Body Mass Index)             76 5' 6.5\" (1.689 m) 37.36 kg/m2          Blood Pressure from Last 3 Encounters:   07/16/18 137/87   06/04/18 136/83   12/11/17 132/82    Weight from Last 3 Encounters:   07/16/18 235 lb (106.6 kg)   06/04/18 233 lb (105.7 kg)   12/11/17 222 lb (100.7 kg)               Primary Care Provider Office Phone # Fax #    Shawn Nolan [phone] [address]  [facility] [address]        Equal Access to Services     BENJI BARBA : Benjamin Garza, waduongda luqadaha, qaybta kaalmada rolandyacarson, gary clemons . So [facility] [phone].    ATENCIÓN: Si habla español, tiene a walker disposición servicios gratuitos de asistencia lingüística. [facility] [phone].    We comply with applicable federal civil rights laws and [place] laws. We do not discriminate on the basis of race, color, national origin, age, disability, sex, sexual orientation, or gender identity.            Thank you!     Thank you for choosing [facility]  for your care. Our goal is always to provide you with excellent care. Hearing back from our patients is one way we can continue to improve our services. Please take a few minutes to complete the written survey that you may receive in the mail after your visit with us. Thank you!             Your Updated Medication List - Protect others around you: Learn how to safely use, store and throw away your medicines at www.disposemymeds.org.          This list is accurate as of 7/16/18  3:12 PM.  Always use your most recent med list.                   Brand Name Dispense Instructions for use Diagnosis    atorvastatin 20 MG tablet    LIPITOR    90 tablet    Take 1 tablet (20 mg) by mouth daily    Hyperlipidemia with target LDL less than 130       BENADRYL PO      Take 25 mg by mouth        cholecalciferol 1000 units Tabs      Take 2,000 " Units by mouth daily.        co-enzyme Q-10 100 MG Caps capsule      Take  by mouth daily.        Fish Oil Oil      2-4 daily 1000mg.        levothyroxine 50 MCG tablet    SYNTHROID/LEVOTHROID    90 tablet    Take 1 tablet (50 mcg) by mouth daily (take along with [place] thyroid 120 mg tablet), as directed    Acquired hypothyroidism       magnesium 250 MG tablet     30 tablet    Take 1 tablet by mouth daily        thyroid 120 MG tablet    [place] THYROID    90 tablet    Take 1 tablet (120 mg) by mouth daily    Acquired hypothyroidism       vitamin B complex with vitamin C Tabs tablet      Take 1 tablet by mouth daily
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
## 2018-09-24 DIAGNOSIS — E03.9 ACQUIRED HYPOTHYROIDISM: ICD-10-CM

## 2018-09-24 LAB
T3FREE SERPL-MCNC: 6 PG/ML (ref 2.3–4.2)
T4 FREE SERPL-MCNC: 1.48 NG/DL (ref 0.76–1.46)
TSH SERPL DL<=0.005 MIU/L-ACNC: 0.01 MU/L (ref 0.4–4)

## 2018-09-24 PROCEDURE — 84443 ASSAY THYROID STIM HORMONE: CPT | Performed by: INTERNAL MEDICINE

## 2018-09-24 PROCEDURE — 36415 COLL VENOUS BLD VENIPUNCTURE: CPT | Performed by: INTERNAL MEDICINE

## 2018-09-24 PROCEDURE — 84439 ASSAY OF FREE THYROXINE: CPT | Performed by: INTERNAL MEDICINE

## 2018-09-24 PROCEDURE — 84481 FREE ASSAY (FT-3): CPT | Performed by: INTERNAL MEDICINE

## 2018-09-27 ENCOUNTER — DOCUMENTATION ONLY (OUTPATIENT)
Dept: ENDOCRINOLOGY | Facility: CLINIC | Age: 63
End: 2018-09-27

## 2018-09-27 DIAGNOSIS — E03.9 HYPOTHYROIDISM, UNSPECIFIED TYPE: Primary | ICD-10-CM

## 2018-09-27 NOTE — PROGRESS NOTES
I called patient today, reviewed the recent thyroid lab tests completed at our [facility].  Tests showed elevated FT4 and FT3, low TSH levels.  She has been on [place] thyroid 120 mg and levothyroxine 0.05 mg po every day.    She reported she is feeling very well and much better on the current thyroid med dosing, no symptoms suggestive of high thyroid levels.  Having success with her diet plan.  We discussed the recent thyroid tests, risks for hyperthyroid symptoms.  She wonders if the morning dosing of the [place] (on day of lab draw) may have influenced the higher thyroid levels.  She requested to stay on the current thyroid med dosing and we agreed, monitor for symptom changes and repeat the (pre-appt) thyroid lab tests in 1-month.    JUAN RAMON Sims MD  Endocrinology   [facility] [place]

## 2018-10-01 ENCOUNTER — MYC MEDICAL ADVICE (OUTPATIENT)
Dept: ENDOCRINOLOGY | Facility: CLINIC | Age: 63
End: 2018-10-01

## 2018-10-01 DIAGNOSIS — E03.9 ACQUIRED HYPOTHYROIDISM: ICD-10-CM

## 2018-10-02 NOTE — TELEPHONE ENCOUNTER
Please see [employer] message:    Ambreen Sims MD 13 hours ago (8:32 PM)                        Dr. Sims,   Over the weekend I started having symptoms that make me think I should   reduce my thyroid medication intake- primarily difficulty sleeping and feeling hyper.   Please advise what changes I should make.  I did not take my medication this   morning and I feel better.   Thank you,   Ambreen Mora

## 2018-10-04 RX ORDER — LEVOTHYROXINE SODIUM 75 UG/1
75 TABLET ORAL DAILY
Qty: 90 TABLET | Refills: 3 | Status: SHIPPED | OUTPATIENT
Start: 2018-10-04 | End: 2019-08-10

## 2018-10-04 RX ORDER — THYROID 90 MG/1
90 TABLET ORAL DAILY
Qty: 90 TABLET | Refills: 3 | Status: SHIPPED | OUTPATIENT
Start: 2018-10-04 | End: 2019-08-10

## 2018-10-05 ENCOUNTER — TELEPHONE (OUTPATIENT)
Dept: FAMILY MEDICINE | Facility: CLINIC | Age: 63
End: 2018-10-05

## 2018-10-05 NOTE — TELEPHONE ENCOUNTER
[facility] calling- [facility]    Verify that [place] dosing decreases, and levothyroxine dosing increased. Huddled with Dr. Davis and confirmed.       Keri FRANCE RN

## 2018-11-20 DIAGNOSIS — E03.9 HYPOTHYROIDISM, UNSPECIFIED TYPE: ICD-10-CM

## 2018-11-20 LAB
T3FREE SERPL-MCNC: 3.2 PG/ML (ref 2.3–4.2)
T4 FREE SERPL-MCNC: 1.31 NG/DL (ref 0.76–1.46)
TSH SERPL DL<=0.005 MIU/L-ACNC: <0.01 MU/L (ref 0.4–4)

## 2018-11-20 PROCEDURE — 84443 ASSAY THYROID STIM HORMONE: CPT | Performed by: INTERNAL MEDICINE

## 2018-11-20 PROCEDURE — 84481 FREE ASSAY (FT-3): CPT | Performed by: INTERNAL MEDICINE

## 2018-11-20 PROCEDURE — 84439 ASSAY OF FREE THYROXINE: CPT | Performed by: INTERNAL MEDICINE

## 2018-11-20 PROCEDURE — 36415 COLL VENOUS BLD VENIPUNCTURE: CPT | Performed by: INTERNAL MEDICINE

## 2018-11-26 ENCOUNTER — OFFICE VISIT (OUTPATIENT)
Dept: ENDOCRINOLOGY | Facility: CLINIC | Age: 63
End: 2018-11-26
Payer: COMMERCIAL

## 2018-11-26 VITALS
HEIGHT: 67 IN | WEIGHT: 222 LBS | HEART RATE: 67 BPM | SYSTOLIC BLOOD PRESSURE: 117 MMHG | DIASTOLIC BLOOD PRESSURE: 74 MMHG | BODY MASS INDEX: 34.84 KG/M2

## 2018-11-26 DIAGNOSIS — E03.9 ACQUIRED HYPOTHYROIDISM: Primary | ICD-10-CM

## 2018-11-26 PROCEDURE — 99214 OFFICE O/P EST MOD 30 MIN: CPT | Performed by: INTERNAL MEDICINE
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
## 2018-11-26 NOTE — MR AVS SNAPSHOT
After Visit Summary   11/26/2018    Ambreen Mora    MRN: [id number]           Patient Information     Date Of Birth          1955        Visit Information        Provider Department      11/26/2018 11:30 AM Rubio Sims MD [facility]        Today's Diagnoses     Acquired hypothyroidism    -  1       Follow-ups after your visit        Follow-up notes from your care team     Return in about 4 months (around 3/26/2019).      Future tests that were ordered for you today     Open Future Orders        Priority Expected Expires Ordered    TSH Routine  11/26/2019 11/26/2018    T4 free Routine  11/26/2019 11/26/2018    T3 Free Routine  11/26/2019 11/26/2018            Who to contact     If you have questions or need follow up information about today's clinic visit or your schedule please contact [facility] directly at [phone].  Normal or non-critical lab and imaging results will be communicated to you by MyChart, letter or phone within 4 business days after the clinic has received the results. If you do not hear from us within 7 days, please contact the clinic through AimWithhart or phone. If you have a critical or abnormal lab result, we will notify you by phone as soon as possible.  Submit refill requests through Mailgun or call your pharmacy and they will forward the refill request to us. Please allow 3 business days for your refill to be completed.          Additional Information About Your Visit        MyChart Information     Mailgun gives you secure access to your electronic health record. If you see a primary care provider, you can also send messages to your care team and make appointments. If you have questions, please call your primary care clinic.  If you do not have a primary care provider, please call [phone] and they will assist you.        Care EveryWhere ID     This is your Care EveryWhere ID. This could be used by other organizations to access your  "[place] medical records  [id number]        Your Vitals Were     Pulse Height BMI (Body Mass Index)             67 1.689 m (5' 6.5\") 35.29 kg/m2          Blood Pressure from Last 3 Encounters:   11/26/18 117/74   07/16/18 137/87   06/04/18 136/83    Weight from Last 3 Encounters:   11/26/18 100.7 kg (222 lb)   07/16/18 106.6 kg (235 lb)   06/04/18 105.7 kg (233 lb)               Primary Care Provider Office Phone # Fax #    Claudiabetorenny Nolan [phone] [address] [address]        Equal Access to Services     [facility] JAMESON : Benjamin beo [place], waaxda luqadaha, qaybta kaalmada adeegyada, gary clemons . So [facility] [phone].    ATENCIÓN: Si habla español, tiene a walker disposición servicios gratuitos de asistencia lingüística. [facility] [phone].    We comply with applicable federal civil rights laws and [place] laws. We do not discriminate on the basis of race, color, national origin, age, disability, sex, sexual orientation, or gender identity.            Thank you!     Thank you for choosing [facility]  for your care. Our goal is always to provide you with excellent care. Hearing back from our patients is one way we can continue to improve our services. Please take a few minutes to complete the written survey that you may receive in the mail after your visit with us. Thank you!             Your Updated Medication List - Protect others around you: Learn how to safely use, store and throw away your medicines at www.disposemymeds.org.          This list is accurate as of 11/26/18 12:00 PM.  Always use your most recent med list.                   Brand Name Dispense Instructions for use Diagnosis    atorvastatin 20 MG tablet    LIPITOR    90 tablet    Take 1 tablet (20 mg) by mouth daily    Hyperlipidemia with target LDL less than 130       BENADRYL PO      Take 25 mg by mouth        cholecalciferol 1000 units Tabs      Take " 2,000 Units by mouth daily.        co-enzyme Q-10 100 MG Caps capsule      Take  by mouth daily.        Fish Oil Oil      2-4 daily 1000mg.        levothyroxine 75 MCG tablet    SYNTHROID/LEVOTHROID    90 tablet    Take 1 tablet (75 mcg) by mouth daily    Acquired hypothyroidism       magnesium 250 MG tablet     30 tablet    Take 1 tablet by mouth daily        thyroid 90 MG tablet    ARMOUR THYROID    90 tablet    Take 1 tablet (90 mg) by mouth daily    Acquired hypothyroidism       vitamin B complex with vitamin C Tabs tablet      Take 1 tablet by mouth daily

## 2018-12-14 ENCOUNTER — TELEPHONE (OUTPATIENT)
Dept: ENDOCRINOLOGY | Facility: CLINIC | Age: 63
End: 2018-12-14

## 2018-12-14 NOTE — TELEPHONE ENCOUNTER
Reason for Call:  Other appointment    Detailed comments: shlomo botello told pat to schedule feb she already has lab appt 2/27 and he told her 1st appointment of the day for follow up    Phone Number Patient can be reached at: Home number on file [phone] (home)    Best Time: any    Can we leave a detailed message on this number? YES    Call taken on 12/14/2018 at 3:49 PM by Thu Alicea

## 2018-12-16 DIAGNOSIS — E78.5 HYPERLIPIDEMIA WITH TARGET LDL LESS THAN 130: ICD-10-CM

## 2018-12-17 RX ORDER — ATORVASTATIN CALCIUM 20 MG/1
TABLET, FILM COATED ORAL
Qty: 90 TABLET | Refills: 0 | Status: SHIPPED | OUTPATIENT
Start: 2018-12-17 | End: 2019-05-21

## 2018-12-17 NOTE — TELEPHONE ENCOUNTER
"Requested Prescriptions   Pending Prescriptions Disp Refills     atorvastatin (LIPITOR) 20 MG tablet [Pharmacy Med Name: ATORVASTATIN CALCIUM 20MG TABS] 90 tablet 3    Last Written Prescription Date:  12/11/2017  Last Fill Quantity: 90,  # refills: 3   Last office visit: 12/11/2017 with prescribing provider:  An   Future Office Visit:   Next 5 appointments (look out 90 days)    Feb 18, 2019 10:00 AM CST  PHYSICAL with Shawn [facility] MD  [facility] [address] [facility] [address]  [phone]          Sig: TAKE ONE TABLET BY MOUTH EVERY DAY    Statins Protocol Failed - 12/16/2018  7:47 PM       Failed - LDL on file in past 12 months    Recent Labs   Lab Test 12/04/17  0824   LDL 72            Failed - Recent (12 mo) or future (30 days) visit within the authorizing provider's specialty    Patient had office visit in the last 12 months or has a visit in the next 30 days with authorizing provider or within the authorizing provider's specialty.  See \"Patient Info\" tab in inbasket, or \"Choose Columns\" in Meds & Orders section of the refill encounter.             Passed - No abnormal creatine kinase in past 12 months    No lab results found.            Passed - Patient is age 18 or older       Passed - No active pregnancy on record       Passed - No positive pregnancy test in past 12 months      A 90 day supply is given, patient is due for an office visit.  Patient has appointment scheduled for 2/18/2018.  JAYNE Best, RN  [employer] [facility]      "

## 2018-12-20 NOTE — TELEPHONE ENCOUNTER
Message noted.  Since this patient works at [employer], can we arrange a 7:45am workin appt with me several (to 1-week) after she has the lab testing?  Thanks.    JUAN RAMON Sims MD  Endocrinology   [facility] [place]

## 2018-12-28 DIAGNOSIS — G47.33 OBSTRUCTIVE SLEEP APNEA (ADULT) (PEDIATRIC): Primary | ICD-10-CM

## 2019-02-27 DIAGNOSIS — E03.9 ACQUIRED HYPOTHYROIDISM: ICD-10-CM

## 2019-02-27 LAB — T3FREE SERPL-MCNC: 3.3 PG/ML (ref 2.3–4.2)

## 2019-02-27 PROCEDURE — 84481 FREE ASSAY (FT-3): CPT | Performed by: INTERNAL MEDICINE

## 2019-02-27 PROCEDURE — 84439 ASSAY OF FREE THYROXINE: CPT | Performed by: INTERNAL MEDICINE

## 2019-02-27 PROCEDURE — 36415 COLL VENOUS BLD VENIPUNCTURE: CPT | Performed by: INTERNAL MEDICINE

## 2019-02-27 PROCEDURE — 84443 ASSAY THYROID STIM HORMONE: CPT | Performed by: INTERNAL MEDICINE

## 2019-02-28 LAB
T4 FREE SERPL-MCNC: 1.4 NG/DL (ref 0.76–1.46)
TSH SERPL DL<=0.005 MIU/L-ACNC: <0.01 MU/L (ref 0.4–4)

## 2019-03-04 ENCOUNTER — OFFICE VISIT (OUTPATIENT)
Dept: ENDOCRINOLOGY | Facility: CLINIC | Age: 64
End: 2019-03-04
Payer: COMMERCIAL

## 2019-03-04 VITALS
DIASTOLIC BLOOD PRESSURE: 68 MMHG | HEIGHT: 67 IN | WEIGHT: 216.9 LBS | SYSTOLIC BLOOD PRESSURE: 115 MMHG | HEART RATE: 80 BPM | BODY MASS INDEX: 34.04 KG/M2

## 2019-03-04 DIAGNOSIS — E03.9 ACQUIRED HYPOTHYROIDISM: Primary | ICD-10-CM

## 2019-03-04 PROCEDURE — 99213 OFFICE O/P EST LOW 20 MIN: CPT | Performed by: INTERNAL MEDICINE

## 2019-03-04 ASSESSMENT — MIFFLIN-ST. JEOR: SCORE: 1563.54
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
## 2019-03-08 ENCOUNTER — TRANSFERRED RECORDS (OUTPATIENT)
Dept: HEALTH INFORMATION MANAGEMENT | Facility: CLINIC | Age: 64
End: 2019-03-08

## 2019-03-14 ENCOUNTER — TELEPHONE (OUTPATIENT)
Dept: SLEEP MEDICINE | Facility: CLINIC | Age: 64
End: 2019-03-14

## 2019-03-14 NOTE — TELEPHONE ENCOUNTER
Left a message explaining the  placed her in a new patient appointment time and asked her to call us to reschedule to a follow up visit. Informed patient I was canceling her appointment for 3-15-19 at 8:30am.

## 2019-03-15 ENCOUNTER — OFFICE VISIT (OUTPATIENT)
Dept: SLEEP MEDICINE | Facility: CLINIC | Age: 64
End: 2019-03-15
Payer: COMMERCIAL

## 2019-03-15 VITALS
SYSTOLIC BLOOD PRESSURE: 115 MMHG | BODY MASS INDEX: 34.06 KG/M2 | HEIGHT: 67 IN | WEIGHT: 217 LBS | HEART RATE: 67 BPM | DIASTOLIC BLOOD PRESSURE: 79 MMHG | OXYGEN SATURATION: 96 % | RESPIRATION RATE: 16 BRPM

## 2019-03-15 DIAGNOSIS — G47.33 OSA (OBSTRUCTIVE SLEEP APNEA): Primary | ICD-10-CM

## 2019-03-15 DIAGNOSIS — G47.00 INSOMNIA, UNSPECIFIED TYPE: ICD-10-CM

## 2019-03-15 PROCEDURE — 99214 OFFICE O/P EST MOD 30 MIN: CPT | Performed by: INTERNAL MEDICINE

## 2019-03-15 ASSESSMENT — MIFFLIN-ST. JEOR: SCORE: 1564

## 2019-03-15 NOTE — PROGRESS NOTES
Obstructive Sleep Apnea - PAP Follow-Up Visit:    Chief Complaint   Patient presents with     CPAP Follow Up     machine less comfortable.       Ambreen Mora comes in today for follow-up of their moderate sleep apnea, managed with CPAP.     PSG on 9/7/2011 at [facility] showed an AHI of 22.     Overall, she rates the experience with PAP as 8 (0 poor, 10 great). The mask is not comfortable. The mask is not leaking.  She is not snoring with the mask on. She is not having gasp arousals.  She is not having significant oral/nasal dryness. The pressure settings are comfortable.     She uses nasal mask.     Bedtime is typically 10 pm. Usually it takes about 10 minutes to fall asleep with the mask on. Wake time is typically 6 am.  Patient is using PAP therapy 7-8 hours per night. The patient is usually getting 7-8 hours of sleep per night.    Patient has sleep maintenance difficulty. She works as a  at the hospital and can be on call. She complains of frequent nocturnal arousals and some nights when se wakes up at 4 am and is unable to return to sleep. She takes occasional Benadryl which she finds helpful. She has not experienced any adverse effects.     She does feel rested in the morning.    Total score - [place]: 6 (3/15/2019  8:32 AM)    [facility]Med   CPAP 11 cmH2O download:  90/90 total days of use. 0 nonuse days. 90/90 days with >4 hours use.  Average use 7 hours 47 minutes per day. Median Leak 0.3 L/min. 95%ile Leak 15.9 L/min. AHI 0.9.       Reviewed by team: Tobacco  Allergies       Reviewed by provider:        Problem List:  Patient Active Problem List    Diagnosis Date Noted     Morbid obesity (H) 11/28/2017     Priority: Medium     Left shoulder pain 01/19/2017     Priority: Medium     Acquired hypothyroidism 06/16/2016     Priority: Medium     Obesity 08/02/2013     Priority: Medium     Vitamin D deficiency disease 08/02/2013     Priority: Medium     External bleeding hemorrhoids 05/12/2013      "Priority: Medium     Esophageal dysmotility 05/12/2013     Priority: Medium     EGD 2010 - with dilation       JASMYNE (obstructive sleep apnea) 05/06/2013     Priority: Medium     Hyperlipidemia with target LDL less than 130 05/06/2013     Priority: Medium     Diagnosis updated by automated process. Provider to review and confirm.            /79   Pulse 67   Resp 16   Ht 1.689 m (5' 6.5\")   Wt 98.4 kg (217 lb)   SpO2 96%   BMI 34.50 kg/m      Impression/Plan:     Moderate sleep apnea.     - Tolerating PAP well but device is malfunctioning and needs replacement. . Daytime symptoms are stable. Regular compliance and normal AHI is demonstrated on download.      Plan:     1. Replacement CPAP device 11 cm H2O    Insomnia    - Consultation with behavioral sleep medicine was discussed along with consideration of Silenor for early morning awakening. She will think about behavioral sleep consultation.       Ambreen Mora will follow up in about 1 year(s).     Twenty-five minutes spent with patient, all of which were spent face-to-face counseling, consulting, coordinating plan of care.      Issa Whitehead MD, MD    CC:  Shawn Nolan,   "

## 2019-03-15 NOTE — NURSING NOTE
"Chief Complaint   Patient presents with     CPAP Follow Up     machine less comfortable.       Initial /79   Pulse 67   Resp 16   Ht 1.689 m (5' 6.5\")   Wt 98.4 kg (217 lb)   SpO2 96%   BMI 34.50 kg/m   Estimated body mass index is 34.5 kg/m  as calculated from the following:    Height as of this encounter: 1.689 m (5' 6.5\").    Weight as of this encounter: 98.4 kg (217 lb).    Medication Reconciliation: complete     ESS 6  Libby Valentine      "

## 2019-03-15 NOTE — PATIENT INSTRUCTIONS
Your BMI is Body mass index is 34.5 kg/m .  Weight management is a personal decision.  If you are interested in exploring weight loss strategies, the following discussion covers the approaches that may be successful. Body mass index (BMI) is one way to tell whether you are at a healthy weight, overweight, or obese. It measures your weight in relation to your height.  A BMI of 18.5 to 24.9 is in the healthy range. A person with a BMI of 25 to 29.9 is considered overweight, and someone with a BMI of 30 or greater is considered obese. More than two-thirds of American adults are considered overweight or obese.  Being overweight or obese increases the risk for further weight gain. Excess weight may lead to heart disease and diabetes.  Creating and following plans for healthy eating and physical activity may help you improve your health.  Weight control is part of healthy lifestyle and includes exercise, emotional health, and healthy eating habits. Careful eating habits lifelong are the mainstay of weight control. Though there are significant health benefits from weight loss, long-term weight loss with diet alone may be very difficult to achieve- studies show long-term success with dietary management in less than 10% of people. Attaining a healthy weight may be especially difficult to achieve in those with severe obesity. In some cases, medications, devices and surgical management might be considered.  What can you do?  If you are overweight or obese and are interested in methods for weight loss, you should discuss this with your provider.     Consider reducing daily calorie intake by 500 calories.     Keep a food journal.     Avoiding skipping meals, consider cutting portions instead.    Diet combined with exercise helps maintain muscle while optimizing fat loss. Strength training is particularly important for building and maintaining muscle mass. Exercise helps reduce stress, increase energy, and improves fitness.  Increasing exercise without diet control, however, may not burn enough calories to loose weight.       Start walking three days a week 10-20 minutes at a time    Work towards walking thirty minutes five days a week     Eventually, increase the speed of your walking for 1-2 minutes at time    In addition, we recommend that you review healthy lifestyles and methods for weight loss available through the National Institutes of Health patient information sites:  http://win.niddk.nih.gov/publications/index.htm    And look into health and wellness programs that may be available through your health insurance provider, employer, local community center, or shaggy club.    Weight management plan: Patient was referred to their PCP to discuss a diet and exercise plan.      Your Body mass index is 34.5 kg/m .  Weight management is a personal decision.  If you are interested in exploring weight loss strategies, the following discussion covers the approaches that may be successful. Body mass index (BMI) is one way to tell whether you are at a healthy weight, overweight, or obese. It measures your weight in relation to your height.  A BMI of 18.5 to 24.9 is in the healthy range. A person with a BMI of 25 to 29.9 is considered overweight, and someone with a BMI of 30 or greater is considered obese. More than two-thirds of American adults are considered overweight or obese.  Being overweight or obese increases the risk for further weight gain. Excess weight may lead to heart disease and diabetes.  Creating and following plans for healthy eating and physical activity may help you improve your health.  Weight control is part of healthy lifestyle and includes exercise, emotional health, and healthy eating habits. Careful eating habits lifelong are the mainstay of weight control. Though there are significant health benefits from weight loss, long-term weight loss with diet alone may be very difficult to achieve- studies show long-term  success with dietary management in less than 10% of people. Attaining a healthy weight may be especially difficult to achieve in those with severe obesity. In some cases, medications, devices and surgical management might be considered.  What can you do?  If you are overweight or obese and are interested in methods for weight loss, you should discuss this with your provider.     Consider reducing daily calorie intake by 500 calories.     Keep a food journal.     Avoiding skipping meals, consider cutting portions instead.    Diet combined with exercise helps maintain muscle while optimizing fat loss. Strength training is particularly important for building and maintaining muscle mass. Exercise helps reduce stress, increase energy, and improves fitness. Increasing exercise without diet control, however, may not burn enough calories to loose weight.       Start walking three days a week 10-20 minutes at a time    Work towards walking thirty minutes five days a week     Eventually, increase the speed of your walking for 1-2 minutes at time    In addition, we recommend that you review healthy lifestyles and methods for weight loss available through the National Institutes of Health patient information sites:  http://win.niddk.nih.gov/publications/index.htm    And look into health and wellness programs that may be available through your health insurance provider, employer, local community center, or shaggy club.    Weight management plan: Patient was referred to their PCP to discuss a diet and exercise plan.

## 2019-03-22 ENCOUNTER — OFFICE VISIT (OUTPATIENT)
Dept: FAMILY MEDICINE | Facility: CLINIC | Age: 64
End: 2019-03-22
Payer: COMMERCIAL

## 2019-03-22 VITALS
DIASTOLIC BLOOD PRESSURE: 83 MMHG | OXYGEN SATURATION: 97 % | WEIGHT: 218 LBS | HEIGHT: 67 IN | HEART RATE: 69 BPM | SYSTOLIC BLOOD PRESSURE: 122 MMHG | BODY MASS INDEX: 34.21 KG/M2 | TEMPERATURE: 98.3 F

## 2019-03-22 DIAGNOSIS — Z00.00 ROUTINE GENERAL MEDICAL EXAMINATION AT A HEALTH CARE FACILITY: Primary | ICD-10-CM

## 2019-03-22 DIAGNOSIS — E78.5 HYPERLIPIDEMIA WITH TARGET LDL LESS THAN 130: ICD-10-CM

## 2019-03-22 DIAGNOSIS — E55.9 VITAMIN D DEFICIENCY DISEASE: ICD-10-CM

## 2019-03-22 DIAGNOSIS — E03.9 ACQUIRED HYPOTHYROIDISM: ICD-10-CM

## 2019-03-22 LAB
ALBUMIN SERPL-MCNC: 3.5 G/DL (ref 3.4–5)
ALP SERPL-CCNC: 102 U/L (ref 40–150)
ALT SERPL W P-5'-P-CCNC: 22 U/L (ref 0–50)
ANION GAP SERPL CALCULATED.3IONS-SCNC: 6 MMOL/L (ref 3–14)
AST SERPL W P-5'-P-CCNC: 14 U/L (ref 0–45)
BILIRUB SERPL-MCNC: 0.3 MG/DL (ref 0.2–1.3)
BUN SERPL-MCNC: 15 MG/DL (ref 7–30)
CALCIUM SERPL-MCNC: 9.6 MG/DL (ref 8.5–10.1)
CHLORIDE SERPL-SCNC: 108 MMOL/L (ref 94–109)
CHOLEST SERPL-MCNC: 136 MG/DL
CO2 SERPL-SCNC: 26 MMOL/L (ref 20–32)
CREAT SERPL-MCNC: 0.65 MG/DL (ref 0.52–1.04)
GFR SERPL CREATININE-BSD FRML MDRD: >90 ML/MIN/{1.73_M2}
GLUCOSE SERPL-MCNC: 84 MG/DL (ref 70–99)
HDLC SERPL-MCNC: 29 MG/DL
LDLC SERPL CALC-MCNC: 85 MG/DL
NONHDLC SERPL-MCNC: 107 MG/DL
POTASSIUM SERPL-SCNC: 3.9 MMOL/L (ref 3.4–5.3)
PROT SERPL-MCNC: 7.3 G/DL (ref 6.8–8.8)
SODIUM SERPL-SCNC: 140 MMOL/L (ref 133–144)
TRIGL SERPL-MCNC: 109 MG/DL

## 2019-03-22 PROCEDURE — 36415 COLL VENOUS BLD VENIPUNCTURE: CPT | Performed by: INTERNAL MEDICINE

## 2019-03-22 PROCEDURE — 80053 COMPREHEN METABOLIC PANEL: CPT | Performed by: INTERNAL MEDICINE

## 2019-03-22 PROCEDURE — 99386 PREV VISIT NEW AGE 40-64: CPT | Performed by: INTERNAL MEDICINE

## 2019-03-22 PROCEDURE — 80061 LIPID PANEL: CPT | Performed by: INTERNAL MEDICINE

## 2019-03-22 PROCEDURE — 82306 VITAMIN D 25 HYDROXY: CPT | Performed by: INTERNAL MEDICINE

## 2019-03-22 ASSESSMENT — MIFFLIN-ST. JEOR: SCORE: 1568.53

## 2019-03-22 NOTE — Clinical Note
Mammogram done on this date: 3/8/2019 (approximately), by this group: [facility], results were Normal.

## 2019-03-22 NOTE — PROGRESS NOTES
SUBJECTIVE:   CC: Ambreen Mora is an 63 year old woman who presents for preventive health visit.     Physical   Annual:     Getting at least 3 servings of Calcium per day:  Yes    Bi-annual eye exam:  Yes    Dental care twice a year:  Yes    Sleep apnea or symptoms of sleep apnea:  Sleep apnea    Diet:  Other    Frequency of exercise:  2-3 days/week    Duration of exercise:  30-45 minutes    Taking medications regularly:  Yes    Medication side effects:  Muscle aches    Additional concerns today:  Yes    PHQ-2 Total Score: 0    Mammogram done on this date: 3/8/2019 (approximately), by this group: [facility], results were Normal.           Today's PHQ-2 Score:   PHQ-2 ( 1999 [employer]) 3/22/2019   Q1: Little interest or pleasure in doing things 0   Q2: Feeling down, depressed or hopeless 0   PHQ-2 Score 0   Q1: Little interest or pleasure in doing things -   Q2: Feeling down, depressed or hopeless -   PHQ-2 Score -       Abuse: Current or Past(Physical, Sexual or Emotional)- No  Do you feel safe in your environment? Yes    Social History     Tobacco Use     Smoking status: Never Smoker     Smokeless tobacco: Never Used   Substance Use Topics     Alcohol use: No     Alcohol/week: 0.0 oz     Alcohol Use 3/19/2019   If you drink alcohol do you typically have greater than 3 drinks per day OR greater than 7 drinks per week? No       Reviewed orders with patient.  Reviewed health maintenance and updated orders accordingly - Yes  Patient Active Problem List   Diagnosis     JASMYNE (obstructive sleep apnea)     Hyperlipidemia with target LDL less than 130     External bleeding hemorrhoids     Esophageal dysmotility     Vitamin D deficiency disease     Acquired hypothyroidism     Left shoulder pain     Morbid obesity (H)     Past Surgical History:   Procedure Laterality Date     BLADDER SURGERY  19    honeycomb     COLONOSCOPY  2005     GENITOURINARY SURGERY  1974     HYSTERECTOMY, PAP NO LONGER INDICATED  1980s        Social History     Tobacco Use     Smoking status: Never Smoker     Smokeless tobacco: Never Used   Substance Use Topics     Alcohol use: No     Alcohol/week: 0.0 oz     Family History   Problem Relation Age of Onset     Hearing Loss Mother         mennier's disease     Thyroid Disease Mother      Thyroid Disease Father      Circulatory Brother         valve issues     Brain Tumor Brother      Hyperlipidemia Sister      Thyroid Disease Sister      Obesity Sister      Hyperlipidemia Sister      Thyroid Disease Sister      Obesity Sister      Mental Illness Niece          Current Outpatient Medications   Medication Sig Dispense Refill     atorvastatin (LIPITOR) 20 MG tablet TAKE ONE TABLET BY MOUTH EVERY DAY 90 tablet 0     cholecalciferol 1000 UNITS TABS Take 2,000 Units by mouth daily.       co-enzyme Q-10 (COQ10) 100 MG CAPS Take  by mouth daily.       DiphenhydrAMINE HCl (BENADRYL PO) Take 25 mg by mouth       Fish Oil OIL 2-4 daily 1000mg.        levothyroxine (SYNTHROID/LEVOTHROID) 75 MCG tablet Take 1 tablet (75 mcg) by mouth daily 90 tablet 3     magnesium 250 MG tablet Take 1 tablet by mouth daily 30 tablet      Thyroid 90 MG TABS Take 1 tablet (90 mg) by mouth daily 90 tablet 3     vitamin B complex with vitamin C (VITAMIN  B COMPLEX) TABS tablet Take 1 tablet by mouth daily         Mammogram Screening: Patient over age 50, mutual decision to screen reflected in health maintenance.    Orders Only on 02/27/2019   Component Date Value Ref Range Status     Free T3 02/27/2019 3.3  2.3 - 4.2 pg/mL Final     T4 Free 02/27/2019 1.40  0.76 - 1.46 ng/dL Final     TSH 02/27/2019 <0.01* 0.40 - 4.00 mU/L Final       Pertinent mammograms are reviewed under the imaging tab.  History of abnormal Pap smear: Status post benign hysterectomy. Health Maintenance and Surgical History updated.     Reviewed and updated as needed this visit by clinical staff  Tobacco  Allergies  Meds  Problems  Med Hx  Surg Hx  Fam Hx      "    Reviewed and updated as needed this visit by Provider  Tobacco  Allergies  Meds  Problems  Med Hx  Surg Hx  Fam Hx            Review of Systems  10 point ROS of systems including Constitutional, Eyes, Respiratory, Cardiovascular, Gastroenterology, Genitourinary, Integumentary, Muscularskeletal, Psychiatric were all negative except for pertinent positives noted in my HPI.     OBJECTIVE:   /83 (BP Location: Left arm, Cuff Size: Adult Large)   Pulse 69   Temp 98.3  F (36.8  C) (Tympanic)   Ht 1.689 m (5' 6.5\")   Wt 98.9 kg (218 lb)   SpO2 97%   Breastfeeding? No   BMI 34.66 kg/m    Physical Exam  GENERAL APPEARANCE: healthy, alert and no distress  EYES: Eyes grossly normal to inspection, PERRL and conjunctivae and sclerae normal  HENT: ear canals and TM's normal, nose and mouth without ulcers or lesions, oropharynx clear and oral mucous membranes moist  NECK: no adenopathy, no asymmetry, masses, or scars and thyroid normal to palpation  RESP: lungs clear to auscultation - no rales, rhonchi or wheezes  BREAST: normal without masses, tenderness or nipple discharge and no palpable axillary masses or adenopathy  CV: regular rate and rhythm, normal S1 S2, no S3 or S4, no murmur, click or rub, no peripheral edema and peripheral pulses strong  ABDOMEN: soft, nontender, no hepatosplenomegaly, no masses and bowel sounds normal  MS: no musculoskeletal defects are noted and gait is age appropriate without ataxia  SKIN: no suspicious lesions or rashes  NEURO: Normal strength and tone, sensory exam grossly normal, mentation intact and speech normal  PSYCH: mentation appears normal and affect normal/bright        ASSESSMENT/PLAN:   Ambreen was seen today for physical.    Diagnoses and all orders for this visit:    Routine general medical examination at a health care facility  MAMMOGRAM NL  colography 2018 normal  Lost 18 lb weight   Hyperlipidemia with target LDL less than 130  -     Lipid panel reflex to " "direct LDL Fasting; Future  -     Comprehensive metabolic panel  -     Lipid panel reflex to direct LDL Non-fasting  Hold atorvastatin  Repeat lipid 2 months  Acquired hypothyroidism  On synthroid, armor thyroid  Seeing endo    Vitamin D deficiency disease  -     Vitamin D Deficiency  We talked about Shingrix also        COUNSELING:  Reviewed preventive health counseling, as reflected in patient instructions       Regular exercise       Healthy diet/nutrition    BP Readings from Last 1 Encounters:   03/22/19 122/83     Estimated body mass index is 34.66 kg/m  as calculated from the following:    Height as of this encounter: 1.689 m (5' 6.5\").    Weight as of this encounter: 98.9 kg (218 lb).      Weight management plan: weight watchers     reports that  has never smoked. she has never used smokeless tobacco.      Counseling Resources:  ATP IV Guidelines  Pooled Cohorts Equation Calculator  Breast Cancer Risk Calculator  FRAX Risk Assessment  ICSI Preventive Guidelines  Dietary Guidelines for Americans, 2010  USDA's MyPlate  ASA Prophylaxis  Lung CA Screening    Shawn Nolan MD  [facility]  "

## 2019-03-23 LAB — DEPRECATED CALCIDIOL+CALCIFEROL SERPL-MC: 58 UG/L (ref 20–75)

## 2019-03-25 ENCOUNTER — TELEPHONE (OUTPATIENT)
Dept: SLEEP MEDICINE | Facility: CLINIC | Age: 64
End: 2019-03-25

## 2019-04-15 ENCOUNTER — DOCUMENTATION ONLY (OUTPATIENT)
Dept: SLEEP MEDICINE | Facility: CLINIC | Age: 64
End: 2019-04-15

## 2019-04-15 NOTE — PROGRESS NOTES
Patient was offered choice of vendor and chose [facility] includes the [facility].  Patient Ambreen Mora was set up at [facility] on April 15, 2019. Patient received a Resmed AirSense 10 Auto. Pressures were set at 11 cm H2O.   Patient s ramp is 7 cm H2O for Off and FLEX/EPR is 2.  Patient received a [employer]beth Resp[employer]ics Mask name: NUANCE PRO  Pillow mask size Small, heated tubing and heated humidifier.  Patient is not enrolled in the STM Program and does not need to meet compliance. Patient has a follow up on FORGOT TO TALK TO PT ABOUT FOLLOW UP with Dr. Whitehead.    Lokesh Milligan

## 2019-05-20 DIAGNOSIS — E78.5 HYPERLIPIDEMIA WITH TARGET LDL LESS THAN 130: ICD-10-CM

## 2019-05-20 LAB
CHOLEST SERPL-MCNC: 208 MG/DL
HDLC SERPL-MCNC: 36 MG/DL
LDLC SERPL CALC-MCNC: 139 MG/DL
NONHDLC SERPL-MCNC: 172 MG/DL
TRIGL SERPL-MCNC: 167 MG/DL

## 2019-05-20 PROCEDURE — 80061 LIPID PANEL: CPT | Performed by: INTERNAL MEDICINE

## 2019-05-20 PROCEDURE — 36415 COLL VENOUS BLD VENIPUNCTURE: CPT | Performed by: INTERNAL MEDICINE

## 2019-05-21 DIAGNOSIS — E78.5 HYPERLIPIDEMIA WITH TARGET LDL LESS THAN 130: ICD-10-CM

## 2019-05-21 NOTE — TELEPHONE ENCOUNTER
"Last Written Prescription Date:  12/17/2018  Last Fill Quantity: 90,  # refills: 0   Last office visit: 3/22/2019 with prescribing provider:     Future Office Visit:  Requested Prescriptions   Pending Prescriptions Disp Refills     atorvastatin (LIPITOR) 20 MG tablet [Pharmacy Med Name: ATORVASTATIN CALCIUM 20MG TABS] 90 tablet 0     Sig: TAKE ONE TABLET BY MOUTH ONCE DAILY. DUE FOR OFFICE VISIT AND LABS BEFORE FURTHER REFILLS       Statins Protocol Passed - 5/21/2019  6:04 AM        Passed - LDL on file in past 12 months     Recent Labs   Lab Test 05/20/19  0827   *             Passed - No abnormal creatine kinase in past 12 months     No lab results found.             Passed - Recent (12 mo) or future (30 days) visit within the authorizing provider's specialty     Patient had office visit in the last 12 months or has a visit in the next 30 days with authorizing provider or within the authorizing provider's specialty.  See \"Patient Info\" tab in inbasket, or \"Choose Columns\" in Meds & Orders section of the refill encounter.              Passed - Medication is active on med list        Passed - Patient is age 18 or older        Passed - No active pregnancy on record        Passed - No positive pregnancy test in past 12 months        "

## 2019-05-22 RX ORDER — ATORVASTATIN CALCIUM 20 MG/1
20 TABLET, FILM COATED ORAL DAILY
Qty: 90 TABLET | Refills: 2 | Status: SHIPPED | OUTPATIENT
Start: 2019-05-22 | End: 2020-03-25

## 2019-07-08 ENCOUNTER — TELEPHONE (OUTPATIENT)
Dept: FAMILY MEDICINE | Facility: CLINIC | Age: 64
End: 2019-07-08

## 2019-07-08 NOTE — TELEPHONE ENCOUNTER
Reason for Call:  Other appointment    Detailed comments: pt would like to be seen by Dr. Nolan for a lump she found on her breast.  Please call to schedule    Phone Number Patient can be reached at: Work number on file:  [phone] (work)    Best Time: any      Can we leave a detailed message on this number? YES    Call taken on 7/8/2019 at 9:20 AM by Marianne Estrella

## 2019-07-08 NOTE — TELEPHONE ENCOUNTER
Patient called back and is now scheduled (I couldn't remove the Admin Hold from the spot)  She was being rude & short when I asked her the reason for the appointment for the Appt. Notes--Sorry    Thank you

## 2019-07-09 ENCOUNTER — OFFICE VISIT (OUTPATIENT)
Dept: FAMILY MEDICINE | Facility: CLINIC | Age: 64
End: 2019-07-09
Payer: COMMERCIAL

## 2019-07-09 ENCOUNTER — HOSPITAL ENCOUNTER (OUTPATIENT)
Dept: MAMMOGRAPHY | Facility: CLINIC | Age: 64
Discharge: HOME | End: 2019-07-09
Attending: INTERNAL MEDICINE | Admitting: INTERNAL MEDICINE
Payer: COMMERCIAL

## 2019-07-09 ENCOUNTER — HOSPITAL ENCOUNTER (OUTPATIENT)
Dept: MAMMOGRAPHY | Facility: CLINIC | Age: 64
End: 2019-07-09
Attending: INTERNAL MEDICINE
Payer: COMMERCIAL

## 2019-07-09 VITALS
HEIGHT: 67 IN | TEMPERATURE: 97.2 F | BODY MASS INDEX: 34.97 KG/M2 | WEIGHT: 222.8 LBS | OXYGEN SATURATION: 100 % | SYSTOLIC BLOOD PRESSURE: 136 MMHG | DIASTOLIC BLOOD PRESSURE: 82 MMHG | HEART RATE: 89 BPM

## 2019-07-09 DIAGNOSIS — N63.0 BREAST MASS: Primary | ICD-10-CM

## 2019-07-09 DIAGNOSIS — N63.0 BREAST MASS: ICD-10-CM

## 2019-07-09 PROCEDURE — 99213 OFFICE O/P EST LOW 20 MIN: CPT | Performed by: INTERNAL MEDICINE

## 2019-07-09 PROCEDURE — 76642 ULTRASOUND BREAST LIMITED: CPT | Mod: RT

## 2019-07-09 PROCEDURE — G0279 TOMOSYNTHESIS, MAMMO: HCPCS

## 2019-07-09 ASSESSMENT — MIFFLIN-ST. JEOR: SCORE: 1590.3

## 2019-07-09 NOTE — PROGRESS NOTES
Subjective     Ambreen Mora is a 63 year old female who presents to clinic today for the following health issues:    HPI   New breast lump noticed towards the end of last week. No pain, slight discoloration in the area on the right breast    Patient noticed this lump in the right breast while she was in the shower and it is been less    Patient Active Problem List   Diagnosis     JASMYNE (obstructive sleep apnea)     Hyperlipidemia with target LDL less than 130     External bleeding hemorrhoids     Esophageal dysmotility     Vitamin D deficiency disease     Acquired hypothyroidism     Left shoulder pain     Morbid obesity (H)     Past Surgical History:   Procedure Laterality Date     BLADDER SURGERY  19    honeycomb     COLONOSCOPY  2005     GENITOURINARY SURGERY  1974     HYSTERECTOMY, PAP NO LONGER INDICATED  1980s       Social History     Tobacco Use     Smoking status: Never Smoker     Smokeless tobacco: Never Used   Substance Use Topics     Alcohol use: No     Alcohol/week: 0.0 oz     Family History   Problem Relation Age of Onset     Hearing Loss Mother         mennier's disease     Thyroid Disease Mother      Thyroid Disease Father      Brain Tumor Brother         Pituitary     Hyperlipidemia Sister      Thyroid Disease Sister      Obesity Sister      Hyperlipidemia Sister      Thyroid Disease Sister      Obesity Sister      Mental Illness Niece          Current Outpatient Medications   Medication Sig Dispense Refill     atorvastatin (LIPITOR) 20 MG tablet Take 1 tablet (20 mg) by mouth daily 90 tablet 2     cholecalciferol 1000 UNITS TABS Take 2,000 Units by mouth daily.       co-enzyme Q-10 (COQ10) 100 MG CAPS Take  by mouth daily.       DiphenhydrAMINE HCl (BENADRYL PO) Take 25 mg by mouth       Fish Oil OIL 2-4 daily 1000mg.        levothyroxine (SYNTHROID/LEVOTHROID) 75 MCG tablet Take 1 tablet (75 mcg) by mouth daily 90 tablet 3     magnesium 250 MG tablet Take 1 tablet by mouth daily 30 tablet       "Thyroid 90 MG TABS Take 1 tablet (90 mg) by mouth daily 90 tablet 3     vitamin B complex with vitamin C (VITAMIN  B COMPLEX) TABS tablet Take 1 tablet by mouth daily           Reviewed and updated as needed this visit by Provider         Review of Systems   10 point ROS of systems including Constitutional, Eyes, Respiratory, Cardiovascular, Gastroenterology, Genitourinary, Integumentary, Muscularskeletal, Psychiatric were all negative except for pertinent positives noted in my HPI.        Objective    /82 (BP Location: Left arm, Patient Position: Sitting, Cuff Size: Adult Regular)   Pulse 89   Temp 97.2  F (36.2  C) (Oral)   Ht 1.689 m (5' 6.5\")   Wt 101.1 kg (222 lb 12.8 oz)   SpO2 100%   BMI 35.42 kg/m    Body mass index is 35.42 kg/m .  Physical Exam   Both breasts are fibrocystic in the right breast upper inner quadrant has superficial perhaps in the skin a pea-sized mobile lesion            Assessment & Plan     Ambreen was seen today for mass.    Diagnoses and all orders for this visit:    Breast mass  -     MA Diagnostic Right w/[place]; Future  -     US Breast Right Complete 4 Quadrants; Future         BMI:   Estimated body mass index is 35.42 kg/m  as calculated from the following:    Height as of this encounter: 1.689 m (5' 6.5\").    Weight as of this encounter: 101.1 kg (222 lb 12.8 oz).   This seems clinically benign but needs diagnostic work-up    A mammogram from March is also reviewed from [facility] and patient was sent to breast center        Return for If not better within 3 days.    Shawn Nolan MD  [facility]        "

## 2019-07-22 ENCOUNTER — TELEPHONE (OUTPATIENT)
Dept: ENDOCRINOLOGY | Facility: CLINIC | Age: 64
End: 2019-07-22

## 2019-07-22 DIAGNOSIS — E03.9 ACQUIRED HYPOTHYROIDISM: Primary | ICD-10-CM

## 2019-07-22 NOTE — TELEPHONE ENCOUNTER
Reason for Call:  Same Day Appointment, Requested Provider:  Dr. Sims    PCP: Shawn Nolan    Reason for visit: 6 mo follow-up for med, and feeling tired    Duration of symptoms: recent, unsure of timeframe    Have you been treated for this in the past? Yes    Additional comments: Pt is currently scheduled for 10/14 @11:30am.  Would like to be seen sooner, due for f/u in Sept.  Nothing available prior to 10/14 appt.  Also, patient needs lab draw for lipid panel per Dr. Nolan.  Pt would like to know if Dr. Sims needs labs w/visit as well before she schedules for lab draw.  Please call patient with direction.      Can we leave a detailed message on this number? YES    Phone number patient can be reached at: Cell number on file:    Telephone Information:   Mobile [phone]       Best Time: any    Call taken on 7/22/2019 at 4:22 PM by Brianna Avelar

## 2019-07-25 NOTE — TELEPHONE ENCOUNTER
Message noted.  OK for patient to have her thyroid lab tests repeated soon... The lab orders have been placed in her chart.  I do not have same-day appointments in my practice but she is welcome to check on any cancellation appointment slots or sooner routine appointments if available.  Please relay message.    Rubio Sims MD  [facility]

## 2019-08-07 ENCOUNTER — MYC MEDICAL ADVICE (OUTPATIENT)
Dept: FAMILY MEDICINE | Facility: CLINIC | Age: 64
End: 2019-08-07

## 2019-08-07 DIAGNOSIS — E78.5 HYPERLIPIDEMIA WITH TARGET LDL LESS THAN 130: ICD-10-CM

## 2019-08-07 DIAGNOSIS — E03.9 ACQUIRED HYPOTHYROIDISM: ICD-10-CM

## 2019-08-07 LAB
CHOLEST SERPL-MCNC: 136 MG/DL
HDLC SERPL-MCNC: 31 MG/DL
LDLC SERPL CALC-MCNC: 83 MG/DL
NONHDLC SERPL-MCNC: 105 MG/DL
T3FREE SERPL-MCNC: 3.1 PG/ML (ref 2.3–4.2)
T4 FREE SERPL-MCNC: 1.36 NG/DL (ref 0.76–1.46)
TRIGL SERPL-MCNC: 108 MG/DL
TSH SERPL DL<=0.005 MIU/L-ACNC: <0.01 MU/L (ref 0.4–4)

## 2019-08-07 PROCEDURE — 84439 ASSAY OF FREE THYROXINE: CPT | Performed by: INTERNAL MEDICINE

## 2019-08-07 PROCEDURE — 84443 ASSAY THYROID STIM HORMONE: CPT | Performed by: INTERNAL MEDICINE

## 2019-08-07 PROCEDURE — 36415 COLL VENOUS BLD VENIPUNCTURE: CPT | Performed by: INTERNAL MEDICINE

## 2019-08-07 PROCEDURE — 84481 FREE ASSAY (FT-3): CPT | Performed by: INTERNAL MEDICINE

## 2019-08-07 PROCEDURE — 80061 LIPID PANEL: CPT | Performed by: INTERNAL MEDICINE

## 2019-08-08 NOTE — TELEPHONE ENCOUNTER
Message noted.  I will review the patient's recently draw thyroid lab tests when results available to me, then provide feedback.    Rubio Sims MD  [facility]

## 2019-08-10 DIAGNOSIS — E03.9 ACQUIRED HYPOTHYROIDISM: ICD-10-CM

## 2019-08-10 RX ORDER — THYROID 90 MG/1
90 TABLET ORAL DAILY
Qty: 90 TABLET | Refills: 1 | Status: SHIPPED | OUTPATIENT
Start: 2019-08-10 | End: 2019-10-20 | Stop reason: DRUGHIGH

## 2019-08-10 RX ORDER — LEVOTHYROXINE SODIUM 50 UG/1
50 TABLET ORAL DAILY
Qty: 90 TABLET | Refills: 1 | Status: SHIPPED | OUTPATIENT
Start: 2019-08-10 | End: 2019-10-20

## 2019-10-14 ENCOUNTER — OFFICE VISIT (OUTPATIENT)
Dept: ENDOCRINOLOGY | Facility: CLINIC | Age: 64
End: 2019-10-14
Payer: COMMERCIAL

## 2019-10-14 VITALS
BODY MASS INDEX: 33.74 KG/M2 | WEIGHT: 215 LBS | HEART RATE: 76 BPM | HEIGHT: 67 IN | SYSTOLIC BLOOD PRESSURE: 117 MMHG | DIASTOLIC BLOOD PRESSURE: 77 MMHG

## 2019-10-14 DIAGNOSIS — E03.9 HYPOTHYROIDISM, UNSPECIFIED TYPE: Primary | ICD-10-CM

## 2019-10-14 LAB — T3FREE SERPL-MCNC: 4.6 PG/ML (ref 2.3–4.2)

## 2019-10-14 PROCEDURE — 84439 ASSAY OF FREE THYROXINE: CPT | Performed by: INTERNAL MEDICINE

## 2019-10-14 PROCEDURE — 36415 COLL VENOUS BLD VENIPUNCTURE: CPT | Performed by: INTERNAL MEDICINE

## 2019-10-14 PROCEDURE — 99213 OFFICE O/P EST LOW 20 MIN: CPT | Performed by: INTERNAL MEDICINE

## 2019-10-14 PROCEDURE — 84481 FREE ASSAY (FT-3): CPT | Performed by: INTERNAL MEDICINE

## 2019-10-14 PROCEDURE — 84443 ASSAY THYROID STIM HORMONE: CPT | Performed by: INTERNAL MEDICINE

## 2019-10-14 ASSESSMENT — MIFFLIN-ST. JEOR: SCORE: 1549.92
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
## 2019-10-15 LAB
T4 FREE SERPL-MCNC: 1.19 NG/DL (ref 0.76–1.46)
TSH SERPL DL<=0.005 MIU/L-ACNC: 0.01 MU/L (ref 0.4–4)

## 2019-10-20 DIAGNOSIS — E03.9 ACQUIRED HYPOTHYROIDISM: Primary | ICD-10-CM

## 2019-10-20 DIAGNOSIS — E03.9 ACQUIRED HYPOTHYROIDISM: ICD-10-CM

## 2019-10-20 DIAGNOSIS — E03.9 HYPOTHYROIDISM, UNSPECIFIED TYPE: ICD-10-CM

## 2019-10-20 RX ORDER — THYROID 30 MG/1
30 TABLET ORAL DAILY
Qty: 90 TABLET | Refills: 3 | Status: SHIPPED | OUTPATIENT
Start: 2019-10-20 | End: 2019-11-14

## 2019-10-20 RX ORDER — LEVOTHYROXINE SODIUM 100 UG/1
100 TABLET ORAL DAILY
Qty: 90 TABLET | Refills: 3 | Status: SHIPPED | OUTPATIENT
Start: 2019-10-20 | End: 2020-10-12

## 2019-11-12 ENCOUNTER — TELEPHONE (OUTPATIENT)
Dept: ENDOCRINOLOGY | Facility: CLINIC | Age: 64
End: 2019-11-12

## 2019-11-12 DIAGNOSIS — E03.9 ACQUIRED HYPOTHYROIDISM: ICD-10-CM

## 2019-11-12 NOTE — TELEPHONE ENCOUNTER
"Dr. Sims,    Pharm calling to clarify [place] Thyroid dose.  Rx is 30 mg, pt states it was increased to 60 mg, but OV notes state \"Continue the current [place] thyroid 90 mg \"    Please advise    Thank you,  Nader RAMIREZ RN  "

## 2019-11-12 NOTE — TELEPHONE ENCOUNTER
Reason for Call:  Medication Question    Other request: Pharmacy Tech calling asking about the MG of her [place] Thyroid  Patient states it was changed to 60mg but the Pharmacy received 30mg 1QD  Please verify and reach out to the Pharmacy         Call taken on 11/12/2019 at 9:32 AM by Wilner Barnhart

## 2019-11-14 RX ORDER — THYROID 60 MG/1
60 TABLET ORAL DAILY
Qty: 90 TABLET | Refills: 3 | Status: SHIPPED | OUTPATIENT
Start: 2019-11-14 | End: 2020-10-12

## 2019-11-14 NOTE — TELEPHONE ENCOUNTER
Message noted, appreciated.  As per my MyChart lab letter of 10/20/19, the new thyroid medication plan is [place] thyroid 60 mg daily and levothyroxine 0.1 mg daily dosing.  The previous [place] thyroid 30 mg Rx was not accurate, so I have now resent the [place] thyroid Rx to the [facility] pharmacy as the 60 mg tablet daily dosing.      BRANDON Sims MD, MS  Endocrinology  [facility]

## 2019-12-20 DIAGNOSIS — E03.9 HYPOTHYROIDISM, UNSPECIFIED TYPE: ICD-10-CM

## 2019-12-20 LAB
T3FREE SERPL-MCNC: 3 PG/ML (ref 2.3–4.2)
T4 FREE SERPL-MCNC: 1.27 NG/DL (ref 0.76–1.46)
TSH SERPL DL<=0.005 MIU/L-ACNC: 0.01 MU/L (ref 0.4–4)

## 2019-12-20 PROCEDURE — 84439 ASSAY OF FREE THYROXINE: CPT | Performed by: INTERNAL MEDICINE

## 2019-12-20 PROCEDURE — 84481 FREE ASSAY (FT-3): CPT | Performed by: INTERNAL MEDICINE

## 2019-12-20 PROCEDURE — 36415 COLL VENOUS BLD VENIPUNCTURE: CPT | Performed by: INTERNAL MEDICINE

## 2019-12-20 PROCEDURE — 84443 ASSAY THYROID STIM HORMONE: CPT | Performed by: INTERNAL MEDICINE

## 2019-12-20 NOTE — RESULT ENCOUNTER NOTE
I believe Dr. Sims ordered these tests and most recently adjusted this patient's thyroid medication, however, the results are noted to be in Dr. Nolan's inbox for whom I am covering today.  I will forward these results to Dr. Sims

## 2020-03-20 DIAGNOSIS — G47.33 OSA (OBSTRUCTIVE SLEEP APNEA): Primary | ICD-10-CM

## 2020-03-24 DIAGNOSIS — E78.5 HYPERLIPIDEMIA WITH TARGET LDL LESS THAN 130: ICD-10-CM

## 2020-03-25 RX ORDER — ATORVASTATIN CALCIUM 20 MG/1
TABLET, FILM COATED ORAL
Qty: 90 TABLET | Refills: 0 | Status: SHIPPED | OUTPATIENT
Start: 2020-03-25 | End: 2020-07-15

## 2020-03-25 NOTE — TELEPHONE ENCOUNTER
"Prescription approved per [facility] Refill Protocol.  Suzanne POLLARD RN    Last Written Prescription Date:  5/22/2019  Last Fill Quantity: 90,  # refills: 2   Last office visit: 7/9/2019 with prescribing provider:     Future Office Visit:   Next 5 appointments (look out 90 days)    Apr 07, 2020 11:00 AM CDT  Return Visit with Rubio Sims MD  [facility] ([address] [address] [phone]         Requested Prescriptions   Pending Prescriptions Disp Refills     atorvastatin (LIPITOR) 20 MG tablet [Pharmacy Med Name: ATORVASTATIN CALCIUM 20MG TABS] 90 tablet 2     Sig: TAKE ONE TABLET BY MOUTH ONCE DAILY       Statins Protocol Passed - 3/24/2020  8:57 PM        Passed - LDL on file in past 12 months     Recent Labs   Lab Test 08/07/19  0802   LDL 83             Passed - No abnormal creatine kinase in past 12 months     No lab results found.             Passed - Recent (12 mo) or future (30 days) visit within the authorizing provider's specialty     Patient has had an office visit with the authorizing provider or a provider within the authorizing providers department within the previous 12 mos or has a future within next 30 days. See \"Patient Info\" tab in inbasket, or \"Choose Columns\" in Meds & Orders section of the refill encounter.              Passed - Medication is active on med list        Passed - Patient is age 18 or older        Passed - No active pregnancy on record        Passed - No positive pregnancy test in past 12 months           "

## 2020-03-30 ENCOUNTER — TELEPHONE (OUTPATIENT)
Dept: ENDOCRINOLOGY | Facility: CLINIC | Age: 65
End: 2020-03-30

## 2020-03-30 NOTE — TELEPHONE ENCOUNTER
Appt scheduled 4/7    Pt states she would do a Telephone visit but says if no LABS are done then she's not sure if a TV is needed.    States she feels fine with current thyroid dose.    Does she need labs or ok to cancel until a later time?    If labs are needed she can get this done this week.    TL- please advise    Rosina Johnson MA

## 2020-03-31 NOTE — TELEPHONE ENCOUNTER
Left message for a call back.    OK to cancel next week appt and R/S for July/2020...    See message below if pt has questions.    Rosina Johnson MA

## 2020-03-31 NOTE — TELEPHONE ENCOUNTER
Message noted.  I would like to cancel any upcoming patient appointment (and lab testing), reschedule the eval appt with me for 7/2020.  Thanks.    BRANDON Sims MD, MS  Endocrinology  [facility]

## 2020-04-03 ENCOUNTER — HOSPITAL ENCOUNTER (OUTPATIENT)
Dept: ULTRASOUND IMAGING | Facility: CLINIC | Age: 65
Discharge: HOME | End: 2020-04-03
Attending: INTERNAL MEDICINE | Admitting: INTERNAL MEDICINE
Payer: COMMERCIAL

## 2020-04-03 ENCOUNTER — APPOINTMENT (OUTPATIENT)
Dept: LAB | Facility: CLINIC | Age: 65
End: 2020-04-03
Attending: INTERNAL MEDICINE
Payer: COMMERCIAL

## 2020-04-03 ENCOUNTER — TELEPHONE (OUTPATIENT)
Dept: FAMILY MEDICINE | Facility: CLINIC | Age: 65
End: 2020-04-03

## 2020-04-03 ENCOUNTER — VIRTUAL VISIT (OUTPATIENT)
Dept: FAMILY MEDICINE | Facility: CLINIC | Age: 65
End: 2020-04-03
Payer: COMMERCIAL

## 2020-04-03 DIAGNOSIS — R10.11 ABDOMINAL PAIN, RIGHT UPPER QUADRANT: Primary | ICD-10-CM

## 2020-04-03 DIAGNOSIS — R10.11 ABDOMINAL PAIN, RIGHT UPPER QUADRANT: ICD-10-CM

## 2020-04-03 LAB
ALBUMIN SERPL-MCNC: 3.9 G/DL (ref 3.4–5)
ALP SERPL-CCNC: 100 U/L (ref 40–150)
ALT SERPL W P-5'-P-CCNC: 27 U/L (ref 0–50)
ANION GAP SERPL CALCULATED.3IONS-SCNC: 4 MMOL/L (ref 3–14)
AST SERPL W P-5'-P-CCNC: 11 U/L (ref 0–45)
BASOPHILS # BLD AUTO: 0 10E9/L (ref 0–0.2)
BASOPHILS NFR BLD AUTO: 0.3 %
BILIRUB SERPL-MCNC: 0.3 MG/DL (ref 0.2–1.3)
BUN SERPL-MCNC: 10 MG/DL (ref 7–30)
CALCIUM SERPL-MCNC: 9.2 MG/DL (ref 8.5–10.1)
CHLORIDE SERPL-SCNC: 109 MMOL/L (ref 94–109)
CO2 SERPL-SCNC: 27 MMOL/L (ref 20–32)
CREAT SERPL-MCNC: 0.71 MG/DL (ref 0.52–1.04)
DIFFERENTIAL METHOD BLD: NORMAL
EOSINOPHIL # BLD AUTO: 0 10E9/L (ref 0–0.7)
EOSINOPHIL NFR BLD AUTO: 0.4 %
ERYTHROCYTE [DISTWIDTH] IN BLOOD BY AUTOMATED COUNT: 13.9 % (ref 10–15)
GFR SERPL CREATININE-BSD FRML MDRD: 89 ML/MIN/{1.73_M2}
GLUCOSE SERPL-MCNC: 93 MG/DL (ref 70–99)
HCT VFR BLD AUTO: 42.6 % (ref 35–47)
HGB BLD-MCNC: 14.1 G/DL (ref 11.7–15.7)
LIPASE SERPL-CCNC: 76 U/L (ref 73–393)
LYMPHOCYTES # BLD AUTO: 2 10E9/L (ref 0.8–5.3)
LYMPHOCYTES NFR BLD AUTO: 28.1 %
MCH RBC QN AUTO: 27.9 PG (ref 26.5–33)
MCHC RBC AUTO-ENTMCNC: 33.1 G/DL (ref 31.5–36.5)
MCV RBC AUTO: 84 FL (ref 78–100)
MONOCYTES # BLD AUTO: 0.5 10E9/L (ref 0–1.3)
MONOCYTES NFR BLD AUTO: 7.6 %
NEUTROPHILS # BLD AUTO: 4.4 10E9/L (ref 1.6–8.3)
NEUTROPHILS NFR BLD AUTO: 63.6 %
PLATELET # BLD AUTO: 240 10E9/L (ref 150–450)
POTASSIUM SERPL-SCNC: 3.9 MMOL/L (ref 3.4–5.3)
PROT SERPL-MCNC: 7.8 G/DL (ref 6.8–8.8)
RBC # BLD AUTO: 5.06 10E12/L (ref 3.8–5.2)
SODIUM SERPL-SCNC: 140 MMOL/L (ref 133–144)
WBC # BLD AUTO: 7 10E9/L (ref 4–11)

## 2020-04-03 PROCEDURE — 80053 COMPREHEN METABOLIC PANEL: CPT | Performed by: INTERNAL MEDICINE

## 2020-04-03 PROCEDURE — 99213 OFFICE O/P EST LOW 20 MIN: CPT | Mod: TEL | Performed by: INTERNAL MEDICINE

## 2020-04-03 PROCEDURE — 36415 COLL VENOUS BLD VENIPUNCTURE: CPT | Performed by: INTERNAL MEDICINE

## 2020-04-03 PROCEDURE — 76705 ECHO EXAM OF ABDOMEN: CPT

## 2020-04-03 PROCEDURE — 85025 COMPLETE CBC W/AUTO DIFF WBC: CPT | Performed by: INTERNAL MEDICINE

## 2020-04-03 PROCEDURE — 83690 ASSAY OF LIPASE: CPT | Performed by: INTERNAL MEDICINE

## 2020-04-03 NOTE — TELEPHONE ENCOUNTER
Myaha called from [facility] regarding US results.  Negative for gallstones or abnormalities seen.     Routing to provider as high priority for review.    BAKARI BestN, RN  [employer] [facility]

## 2020-04-03 NOTE — TELEPHONE ENCOUNTER
Reason for call:  Patient reporting a symptom    Symptom or request: Pain/ gallbladder area? & nausea    Duration (how long have symptoms been present): today @ 2:30 AM    Have you been treated for this before? No    Additional comments: Ambreen calling in to set up appt for TE for medical leave of absence but she also wanted to discuss sudden pain that woke her up this morning. Pt is also nauseated and hasn't had anything to eat.    TE is scheduled for 2pm w/ Nolan today.    Please call.    Phone Number patient can be reached at:  Cell number on file:    Telephone Information:   Mobile [phone]       Best Time:  any    Can we leave a detailed message on this number:  YES    Call taken on 4/3/2020 at 8:50 AM by Brent Ritchie

## 2020-04-03 NOTE — LETTER
[place] [address] [facility] [place] [address]  Phone: [phone]    04/03/20    Ambreen Mora  [address] [place] [address]      To whom it may concern:     Patient is sick today and is being worked up.  She will not be able to work today    Sincerely,      Shawn Nolan MD

## 2020-04-03 NOTE — PROGRESS NOTES
"Subjective     Ambreen Mora is a 64 year old female who is being evaluated via a billable telephone visit.      The patient has been notified of following:     \"This telephone visit will be conducted via a call between you and your physician/provider. We have found that certain health care needs can be provided without the need for a physical exam.  This service lets us provide the care you need with a short phone conversation.  If a prescription is necessary we can send it directly to your pharmacy.  If lab work is needed we can place an order for that and you can then stop by our lab to have the test done at a later time.    If during the course of the call the physician/provider feels a telephone visit is not appropriate, you will not be charged for this service.\"     Patient has given verbal consent for Telephone visit?  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[employer]  She has not noted fever or COVID 19 symptoms   But, since last night, has severe pain rt UQ and has been nauseous  She also wonders if she should not be working due to her risk factors    ALLERGIES  Macrodantin [nitrofurantoin]; Penicillins; Prochlorperazine; Seasonal allergies; and Sulfa drugs               Patient Active Problem List   Diagnosis     JASMYNE (obstructive sleep apnea)     Hyperlipidemia with target LDL less than 130     External bleeding hemorrhoids     Esophageal dysmotility     Vitamin D deficiency disease     Acquired hypothyroidism     Left shoulder pain     Morbid obesity (H)     Past Surgical History:   Procedure Laterality Date     BLADDER SURGERY  19    honeycomb     COLONOSCOPY  2005     GENITOURINARY SURGERY  1974     HYSTERECTOMY, PAP NO " LONGER INDICATED  1980s       Social History     Tobacco Use     Smoking status: Never Smoker     Smokeless tobacco: Never Used   Substance Use Topics     Alcohol use: No     Alcohol/week: 0.0 standard drinks     Family History   Problem Relation Age of Onset     Hearing Loss Mother         mennier's disease     Thyroid Disease Mother      Thyroid Disease Father      Brain Tumor Brother         Pituitary     Hyperlipidemia Sister      Thyroid Disease Sister      Obesity Sister      Hyperlipidemia Sister      Thyroid Disease Sister      Obesity Sister      Mental Illness Niece          Current Outpatient Medications   Medication Sig Dispense Refill     atorvastatin (LIPITOR) 20 MG tablet TAKE ONE TABLET BY MOUTH ONCE DAILY 90 tablet 0     cholecalciferol 1000 UNITS TABS Take 2,000 Units by mouth daily.       co-enzyme Q-10 (COQ10) 100 MG CAPS Take  by mouth daily.       DiphenhydrAMINE HCl (BENADRYL PO) Take 25 mg by mouth       Fish Oil OIL 2-4 daily 1000mg.        levothyroxine (SYNTHROID/LEVOTHROID) 100 MCG tablet Take 1 tablet (100 mcg) by mouth daily 90 tablet 3     magnesium 250 MG tablet Take 1 tablet by mouth daily 30 tablet      thyroid (ARMOUR) 60 MG tablet Take 1 tablet (60 mg) by mouth daily 90 tablet 3     TURMERIC PO        vitamin B complex with vitamin C (VITAMIN  B COMPLEX) TABS tablet Take 1 tablet by mouth daily       Allergies   Allergen Reactions     Macrodantin [Nitrofurantoin]      Penicillins      Hives     Prochlorperazine Other (See Comments)     Slurred speech     Seasonal Allergies Other (See Comments)     Eyes swollen     Sulfa Drugs        Reviewed and updated as needed this visit by Provider         Review of Systems   10 point ROS of systems including Constitutional, Eyes, Respiratory, Cardiovascular, Gastroenterology, Genitourinary, Integumentary, Muscularskeletal, Psychiatric were all negative except for pertinent positives noted in my HPI.         Objective   Reported vitals:  There  were no vitals taken for this visit.   healthy, alert and no distress  Psych: Alert and oriented times 3; coherent speech, normal   rate and volume, able to articulate logical thoughts, able   to abstract reason, no tangential thoughts, no hallucinations   or delusions  Her affect is normal              Assessment/Plan:  1. Abdominal pain, right upper quadrant  Patient will take her temp  She will inform [facility]  She will let me know if letter is needed per [facility]    - CBC with platelets and differential  - Comprehensive metabolic panel  - US Abdomen Limited; Future  - Lipase    I spoke to patient about her labs and ultrasound  She will take her temperature and try PPI if needed OVER THE COUNTER     Phone call duration:  7.50 minutes AND 4.20  Total 11.70 minutes     Shawn Nolan MD

## 2020-07-14 ENCOUNTER — DOCUMENTATION ONLY (OUTPATIENT)
Dept: FAMILY MEDICINE | Facility: CLINIC | Age: 65
End: 2020-07-14

## 2020-07-14 DIAGNOSIS — E78.5 HYPERLIPIDEMIA WITH TARGET LDL LESS THAN 130: Primary | ICD-10-CM

## 2020-07-14 DIAGNOSIS — E03.9 ACQUIRED HYPOTHYROIDISM: ICD-10-CM

## 2020-07-14 DIAGNOSIS — E78.5 HYPERLIPIDEMIA WITH TARGET LDL LESS THAN 130: ICD-10-CM

## 2020-07-14 DIAGNOSIS — E55.9 VITAMIN D DEFICIENCY DISEASE: ICD-10-CM

## 2020-07-14 DIAGNOSIS — E66.01 MORBID OBESITY (H): ICD-10-CM

## 2020-07-15 DIAGNOSIS — E03.9 ACQUIRED HYPOTHYROIDISM: ICD-10-CM

## 2020-07-15 DIAGNOSIS — E78.5 HYPERLIPIDEMIA WITH TARGET LDL LESS THAN 130: ICD-10-CM

## 2020-07-15 PROCEDURE — 36415 COLL VENOUS BLD VENIPUNCTURE: CPT | Performed by: INTERNAL MEDICINE

## 2020-07-15 PROCEDURE — 80053 COMPREHEN METABOLIC PANEL: CPT | Performed by: INTERNAL MEDICINE

## 2020-07-15 PROCEDURE — 80061 LIPID PANEL: CPT | Performed by: INTERNAL MEDICINE

## 2020-07-15 PROCEDURE — 84439 ASSAY OF FREE THYROXINE: CPT | Performed by: INTERNAL MEDICINE

## 2020-07-15 PROCEDURE — 84443 ASSAY THYROID STIM HORMONE: CPT | Performed by: INTERNAL MEDICINE

## 2020-07-15 RX ORDER — ATORVASTATIN CALCIUM 20 MG/1
TABLET, FILM COATED ORAL
Qty: 90 TABLET | Refills: 0 | Status: SHIPPED | OUTPATIENT
Start: 2020-07-15 | End: 2020-11-04

## 2020-07-16 ENCOUNTER — HOSPITAL ENCOUNTER (OUTPATIENT)
Dept: MAMMOGRAPHY | Facility: CLINIC | Age: 65
Discharge: HOME | End: 2020-07-16
Attending: INTERNAL MEDICINE | Admitting: INTERNAL MEDICINE
Payer: COMMERCIAL

## 2020-07-16 DIAGNOSIS — Z12.31 VISIT FOR SCREENING MAMMOGRAM: ICD-10-CM

## 2020-07-16 LAB
ALBUMIN SERPL-MCNC: 3.6 G/DL (ref 3.4–5)
ALP SERPL-CCNC: 105 U/L (ref 40–150)
ALT SERPL W P-5'-P-CCNC: 31 U/L (ref 0–50)
ANION GAP SERPL CALCULATED.3IONS-SCNC: 6 MMOL/L (ref 3–14)
AST SERPL W P-5'-P-CCNC: 15 U/L (ref 0–45)
BILIRUB SERPL-MCNC: 0.3 MG/DL (ref 0.2–1.3)
BUN SERPL-MCNC: 12 MG/DL (ref 7–30)
CALCIUM SERPL-MCNC: 8.7 MG/DL (ref 8.5–10.1)
CHLORIDE SERPL-SCNC: 109 MMOL/L (ref 94–109)
CHOLEST SERPL-MCNC: 157 MG/DL
CO2 SERPL-SCNC: 24 MMOL/L (ref 20–32)
CREAT SERPL-MCNC: 0.75 MG/DL (ref 0.52–1.04)
GFR SERPL CREATININE-BSD FRML MDRD: 84 ML/MIN/{1.73_M2}
GLUCOSE SERPL-MCNC: 98 MG/DL (ref 70–99)
HDLC SERPL-MCNC: 29 MG/DL
LDLC SERPL CALC-MCNC: 64 MG/DL
NONHDLC SERPL-MCNC: 128 MG/DL
POTASSIUM SERPL-SCNC: 3.8 MMOL/L (ref 3.4–5.3)
PROT SERPL-MCNC: 7.5 G/DL (ref 6.8–8.8)
SODIUM SERPL-SCNC: 139 MMOL/L (ref 133–144)
T4 FREE SERPL-MCNC: 1.39 NG/DL (ref 0.76–1.46)
TRIGL SERPL-MCNC: 319 MG/DL
TSH SERPL DL<=0.005 MIU/L-ACNC: 0.02 MU/L (ref 0.4–4)

## 2020-07-16 PROCEDURE — 77063 BREAST TOMOSYNTHESIS BI: CPT

## 2020-09-11 ENCOUNTER — VIRTUAL VISIT (OUTPATIENT)
Dept: ENDOCRINOLOGY | Facility: CLINIC | Age: 65
End: 2020-09-11
Payer: COMMERCIAL

## 2020-09-11 DIAGNOSIS — E03.9 ACQUIRED HYPOTHYROIDISM: Primary | ICD-10-CM

## 2020-09-11 PROCEDURE — 99213 OFFICE O/P EST LOW 20 MIN: CPT | Mod: 95 | Performed by: INTERNAL MEDICINE
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
## 2020-09-24 DIAGNOSIS — E03.9 ACQUIRED HYPOTHYROIDISM: ICD-10-CM

## 2020-09-24 LAB — T3FREE SERPL-MCNC: 3.6 PG/ML (ref 2.3–4.2)

## 2020-09-24 PROCEDURE — 36415 COLL VENOUS BLD VENIPUNCTURE: CPT | Performed by: INTERNAL MEDICINE

## 2020-09-24 PROCEDURE — 84481 FREE ASSAY (FT-3): CPT | Performed by: INTERNAL MEDICINE

## 2020-09-24 PROCEDURE — 84443 ASSAY THYROID STIM HORMONE: CPT | Performed by: INTERNAL MEDICINE

## 2020-09-24 PROCEDURE — 84439 ASSAY OF FREE THYROXINE: CPT | Performed by: INTERNAL MEDICINE

## 2020-09-25 NOTE — RESULT ENCOUNTER NOTE
The following letter pertains to your most recent diagnostic tests:    My name is Dr. Rodriguez and I am covering for Dr. Nolan who is out of the office today.     Your thyroid results indicate that you may be getting a little too much thyroid medication.  I think Dr. Sims helps with your thyroid medications.  I would schedule a virtual visit with him to discuss these results which have been persistent.         Sincerely,    Dr. Rodriguez

## 2020-09-27 ENCOUNTER — MYC MEDICAL ADVICE (OUTPATIENT)
Dept: ENDOCRINOLOGY | Facility: CLINIC | Age: 65
End: 2020-09-27

## 2020-09-28 ENCOUNTER — MYC MEDICAL ADVICE (OUTPATIENT)
Dept: ENDOCRINOLOGY | Facility: CLINIC | Age: 65
End: 2020-09-28

## 2020-10-05 ENCOUNTER — MYC MEDICAL ADVICE (OUTPATIENT)
Dept: ENDOCRINOLOGY | Facility: CLINIC | Age: 65
End: 2020-10-05

## 2020-10-08 NOTE — TELEPHONE ENCOUNTER
Dr Sims - see below     Pt now agreeable to thyroid changes as advised in lab notes    Pharmacy is pended    Lalitha SAENZ RN

## 2020-10-12 DIAGNOSIS — E03.9 HYPOTHYROIDISM, UNSPECIFIED TYPE: Primary | ICD-10-CM

## 2020-10-12 DIAGNOSIS — E03.9 ACQUIRED HYPOTHYROIDISM: ICD-10-CM

## 2020-10-12 RX ORDER — LEVOTHYROXINE SODIUM 137 UG/1
137 TABLET ORAL DAILY
Qty: 30 TABLET | Refills: 3 | Status: SHIPPED | OUTPATIENT
Start: 2020-10-12 | End: 2020-10-23

## 2020-10-12 RX ORDER — LIOTHYRONINE SODIUM 5 UG/1
5 TABLET ORAL DAILY
Qty: 30 TABLET | Refills: 3 | Status: SHIPPED | OUTPATIENT
Start: 2020-10-12 | End: 2020-11-16 | Stop reason: DRUGHIGH

## 2020-10-23 ENCOUNTER — VIRTUAL VISIT (OUTPATIENT)
Dept: FAMILY MEDICINE | Facility: CLINIC | Age: 65
End: 2020-10-23
Payer: COMMERCIAL

## 2020-10-23 DIAGNOSIS — R10.11 ABDOMINAL PAIN, RIGHT UPPER QUADRANT: ICD-10-CM

## 2020-10-23 DIAGNOSIS — E55.9 VITAMIN D DEFICIENCY DISEASE: ICD-10-CM

## 2020-10-23 DIAGNOSIS — M94.9 DISORDER OF BONE AND CARTILAGE: ICD-10-CM

## 2020-10-23 DIAGNOSIS — E28.39 OTHER PRIMARY OVARIAN FAILURE: ICD-10-CM

## 2020-10-23 DIAGNOSIS — E78.5 HYPERLIPIDEMIA WITH TARGET LDL LESS THAN 130: ICD-10-CM

## 2020-10-23 DIAGNOSIS — Z00.00 ROUTINE GENERAL MEDICAL EXAMINATION AT A HEALTH CARE FACILITY: Primary | ICD-10-CM

## 2020-10-23 DIAGNOSIS — E03.9 ACQUIRED HYPOTHYROIDISM: ICD-10-CM

## 2020-10-23 DIAGNOSIS — M89.9 DISORDER OF BONE AND CARTILAGE: ICD-10-CM

## 2020-10-23 DIAGNOSIS — E66.01 MORBID OBESITY (H): ICD-10-CM

## 2020-10-23 PROCEDURE — 99397 PER PM REEVAL EST PAT 65+ YR: CPT | Mod: 95 | Performed by: INTERNAL MEDICINE

## 2020-10-23 RX ORDER — LEVOTHYROXINE SODIUM 137 UG/1
137 TABLET ORAL DAILY
Qty: 30 TABLET | Refills: 3 | Status: SHIPPED | OUTPATIENT
Start: 2020-10-23 | End: 2020-11-16

## 2020-10-23 RX ORDER — OMEPRAZOLE 20 MG/1
20 TABLET, DELAYED RELEASE ORAL DAILY
Qty: 31 TABLET | Refills: 1 | Status: SHIPPED | OUTPATIENT
Start: 2020-10-23 | End: 2021-01-06

## 2020-10-23 NOTE — PROGRESS NOTES
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      The patient has been notified of following:     \"This video visit will be conducted via a call between you and your physician/provider. We have found that certain health care needs can be provided without the need for an in-person physical exam.  This service lets us provide the care you need with a video conversation.  If a prescription is necessary we can send it directly to your pharmacy.  If lab work is needed we can place an order for that and you can then stop by our lab to have the test done at a later time.    Video visits are billed at different rates depending on your insurance coverage.  Please reach out to your insurance provider with any questions.    If during the course of the call the physician/provider feels a video visit is not appropriate, you will not be charged for this service.\"    Patient has given verbal consent for Video visit? Yes  How would you like to obtain your AVS? MyChart  If you are dropped from the video visit, the video invite should be resent to: Text to cell phone: [phone]  Will anyone else be joining your video visit? 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  She has been doing very well except that her thyroid fluctuates a lot  She is otherwise doing well and trying to lose the weight she is gaining after [employer] with more sedentary life  [place]   she has right upper quadrant abdominal pain at times which has been previously worked up but it is worse when the stomach is empty   ultrasound of the abdomen was negative for gallbladder issues   she feels more fullness and bloating and burping also in addition to this     Annual Wellness " "Visit    Patient has been advised of split billing requirements and indicates understanding: Yes     Are you in the first 12 months of your Medicare Part B coverage? No    Physical Health:    In general, how would you rate your overall physical health? good    Outside of work, how many days during the week do you exercise?4-5 days/week    Outside of work, approximately how many minutes a day do you exercise?45-60 minutes    If you drink alcohol do you typically have >3 drinks per day or >7 drinks per week? No    Do you usually eat at least 4 servings of fruit and vegetables a day, include whole grains & fiber and avoid regularly eating high fat or \"junk\" foods? Yes    Do you have any problems taking medications regularly? No    Do you have any side effects from medications? Yes - fatigue     Needs assistance for the following daily activities: no assistance needed    Which of the following safety concerns are present in your home?  none identified     Hearing impairment: No    In the past 6 months, have you been bothered by leaking of urine? no    There were no vitals taken for this visit.  Weight: Unable to obtain due to video visit  Height: Unable to obtain due to video visit  BMI: Unable to obtain due to video visit  Blood Pressure: Unable to obtain due to video visit    Mental Health:    In general, how would you rate your overall mental or emotional health? excellent  PHQ-2 Score: 0    Do you feel safe in your environment? Yes    Have you ever done Advance Care Planning? (For example, a Health Directive, POLST, or a discussion with a medical provider or your loved ones about your wishes)? Yes, patient states has an Advance Care Planning document and will bring a copy to the clinic.    Fall risk:  Fallen 2 or more times in the past year?: No  Any fall with injury in the past year?: No    Cognitive Screening: Unable to complete due to virtual visit; need for additional assessment in future face-to-face " visit    Do you have sleep apnea, excessive snoring or daytime drowsiness?: yes    Current providers sharing in care for this patient include:   Patient Care Team:  Shawn Nolan MD as PCP - General (Internal Medicine)  Shawn Nolan MD as Assigned PCP    Patient has been advised of split billing requirements and indicates understanding: Yes       Video Start Time: 1.40        Review of Systems   10 point ROS of systems including Constitutional, Eyes, Respiratory, Cardiovascular, Gastroenterology, Genitourinary, Integumentary, Muscularskeletal, Psychiatric were all negative except for pertinent positives noted in my HPI.        Objective           Vitals:  No vitals were obtained today due to virtual visit.    Physical Exam     GENERAL: Healthy, alert and no distress  EYES: Eyes grossly normal to inspection.  No discharge or erythema, or obvious scleral/conjunctival abnormalities.  RESP: No audible wheeze, cough, or visible cyanosis.  No visible retractions or increased work of breathing.    SKIN: Visible skin clear. No significant rash, abnormal pigmentation or lesions.  NEURO: Cranial nerves grossly intact.  Mentation and speech appropriate for age.  PSYCH: Mentation appears normal, affect normal/bright, judgement and insight intact, normal speech and appearance well-groomed.      Orders Only on 09/24/2020   Component Date Value Ref Range Status     Free T3 09/24/2020 3.6  2.3 - 4.2 pg/mL Final     T4 Free 09/24/2020 1.40  0.76 - 1.46 ng/dL Final     TSH 09/24/2020 <0.01* 0.40 - 4.00 mU/L Final     Lab Results   Component Value Date    CHOL 157 07/15/2020     Lab Results   Component Value Date    HDL 29 07/15/2020     Lab Results   Component Value Date    LDL 64 07/15/2020     Lab Results   Component Value Date    TRIG 319 07/15/2020     Lab Results   Component Value Date    CHOLHDLRATIO 5.4 10/06/2015     Last Comprehensive Metabolic Panel:  Sodium   Date Value Ref Range Status   07/15/2020 139 133 - 144 mmol/L  Final     Potassium   Date Value Ref Range Status   07/15/2020 3.8 3.4 - 5.3 mmol/L Final     Chloride   Date Value Ref Range Status   07/15/2020 109 94 - 109 mmol/L Final     Carbon Dioxide   Date Value Ref Range Status   07/15/2020 24 20 - 32 mmol/L Final     Anion Gap   Date Value Ref Range Status   07/15/2020 6 3 - 14 mmol/L Final     Glucose   Date Value Ref Range Status   07/15/2020 98 70 - 99 mg/dL Final     Comment:     Non Fasting     Urea Nitrogen   Date Value Ref Range Status   07/15/2020 12 7 - 30 mg/dL Final     Creatinine   Date Value Ref Range Status   07/15/2020 0.75 0.52 - 1.04 mg/dL Final     GFR Estimate   Date Value Ref Range Status   07/15/2020 84 >60 mL/min/[1.73_m2] Final     Comment:     Non  GFR Calc  Starting 12/18/2018, serum creatinine based estimated GFR (eGFR) will be   calculated using the Chronic Kidney Disease Epidemiology Collaboration   (CKD-EPI) equation.       Calcium   Date Value Ref Range Status   07/15/2020 8.7 8.5 - 10.1 mg/dL Final             Assessment & Plan     Ambreen was seen today for physical.    Diagnoses and all orders for this visit:    Routine general medical examination at a health care facility    Acquired hypothyroidism  -     levothyroxine (SYNTHROID/LEVOTHROID) 137 MCG tablet; Take 1 tablet (137 mcg) by mouth daily  -     US Thyroid; Future    Vitamin D deficiency disease    Morbid obesity (H)    Hyperlipidemia with target LDL less than 130    Disorder of bone and cartilage  -     DX Hip/Pelvis/Spine; Future    Abdominal pain, right upper quadrant  -     Helicobacter pylori Antigen Stool; Future  -     XR Chest 2 Views; Future  -     omeprazole (PRILOSEC OTC) 20 MG EC tablet; Take 1 tablet (20 mg) by mouth daily  -     XR Esophagram w Upper GI; Future    Other primary ovarian failure   -     DX Hip/Pelvis/Spine; Future            I think patient has hiatus hernia syndrome and we will check H. pylori as well to look for treatable causes   we  will also do upper GI x-rays and weight loss will help   I educated her about the smaller meals and weight loss   with the fluctuating thyroid levels, I will check a thyroid ultrasound to look for an autonomous nodule which might need further work-up and also change her thyroid medication to brand name   patient will have an annual mammogram   she is up to date on immunizations but will need a Prevnar and Pneumovax now      Return in about 1 year (around 10/23/2021) for Physical Exam.    Shawn Nolan MD  [facility]      Video-Visit Details    Type of service:  Video Visit    Video End Time:2.10    Originating Location (pt. Location): Home    Distant Location (provider location):  [facility]     Platform used for Video Visit: oDesk

## 2020-10-23 NOTE — PATIENT INSTRUCTIONS
prevnar   6 months, pneumovax    Dexa scan  Patient Education     Tips to Control Acid Reflux    To control acid reflux, you ll need to make some basic diet and lifestyle changes. The simple steps outlined below may be all you ll need to ease discomfort.  Watch what you eat    Avoid fatty foods and spicy foods.    Eat fewer acidic foods, such as citrus and tomato-based foods. These can increase symptoms.    Limit drinking alcohol, caffeine, and fizzy beverages. All increase acid reflux.    Try limiting chocolate, peppermint, and spearmint. These can worsen acid reflux in some people.  Watch when you eat    Avoid lying down for 3 hours after eating.    Do not snack before going to bed.  Raise your head  Raising your head and upper body by 4 to 6 inches helps limit reflux when you re lying down. Put blocks under the head of your bed frame to raise it.  Other changes    Lose weight, if you need to    Don t exercise near bedtime    Avoid tight-fitting clothes    Limit aspirin and ibuprofen    Stop smoking   Date Last Reviewed: 7/1/2016 2000-2019 [employer]. [address] [place] [address]. All rights reserved. This information is not intended as a substitute for professional medical care. Always follow your healthcare professional's instructions.           Patient Education     Medicines for Acid Reflux  Your healthcare provider has told you that you have acid reflux. This condition causes stomach acid to wash up into your throat. For most people, acid reflux is troubling but not dangerous. But left untreated, acid reflux sometimes damages the esophagus. Medicines can help control acid reflux and limit your risk of future problems.  Medicines for acid reflux  Your healthcare provider may prescribe medicine to help treat your acid reflux. Medicine will be based on your symptoms and any test results. Your provider will explain how to take your medicine. You will also be told about possible side  effects.  Reducing stomach acid  Your provider may suggest antacids that you can buy over the counter. Antacids can give fast relief. Or you may be told to take a type of medicine called H2 blockers. These are available over the counter and by prescription (for higher doses).  Blocking stomach acid  In more severe cases, your healthcare provider may suggest stronger medicines such as proton pump inhibitors (PPIs). These keep the stomach from making acid. They are often prescribed for long-term use.  Other medicines  In some cases medicines to reduce or block stomach acid may not work. Then you may be switched to another type of medicine that helps your stomach empty better.     Date Last Reviewed: 10/1/2016    3260-0846 [employer]. [address] [place] [address], [place] [address]. All rights reserved. This information is not intended as a substitute for professional medical care. Always follow your healthcare professional's instructions.           Patient Education     Lifestyle Changes for Controlling GERD  When you have GERD, stomach acid feels as if it s backing up toward your mouth. Whether or not you take medicine to control your GERD, your symptoms can often be improved with lifestyle changes. Talk to your healthcare provider about the following suggestions. These suggestions may help you get relief from your symptoms.      Raise your head  Reflux is more likely to strike when you re lying down flat, because stomach fluid can flow backward more easily. Raising the head of your bed 4 to 6 inches can help. To do this:    Slide blocks or books under the legs at the head of your bed. Or, place a wedge under the mattress. Many foam stores can make a suitable wedge for you. The wedge should run from your waist to the top of your head.    Don t just prop your head on several pillows. This increases pressure on your stomach. It can make GERD worse.  Watch your eating habits  Certain foods may increase the acid  in your stomach or relax the lower esophageal sphincter. This makes GERD more likely. It s best to avoid the following if they cause you symptoms:    Coffee, tea, and carbonated drinks (with and without caffeine)    Fatty, fried, or spicy food    Mint, chocolate, onions, and tomatoes    Peppermint    Any other foods that seem to irritate your stomach or cause you pain  Relieve the pressure  Tips include the following:    Eat smaller meals, even if you have to eat more often.    Don t lie down right after you eat. Wait a few hours for your stomach to empty.    Avoid tight belts and tight-fitting clothes.    Lose excess weight.  Tobacco and alcohol  Avoid smoking tobacco and drinking alcohol. They can make GERD symptoms worse.  Date Last Reviewed: 7/1/2016 2000-2019 [employer]. [address] [place] [address], [place] [address]. All rights reserved. This information is not intended as a substitute for professional medical care. Always follow your healthcare professional's instructions.

## 2020-10-26 ENCOUNTER — ANCILLARY PROCEDURE (OUTPATIENT)
Dept: GENERAL RADIOLOGY | Facility: CLINIC | Age: 65
End: 2020-10-26
Attending: INTERNAL MEDICINE
Payer: COMMERCIAL

## 2020-10-26 DIAGNOSIS — R10.11 ABDOMINAL PAIN, RIGHT UPPER QUADRANT: ICD-10-CM

## 2020-10-26 PROCEDURE — 71046 X-RAY EXAM CHEST 2 VIEWS: CPT | Performed by: RADIOLOGY

## 2020-10-29 DIAGNOSIS — R10.11 ABDOMINAL PAIN, RIGHT UPPER QUADRANT: ICD-10-CM

## 2020-10-29 PROCEDURE — 87338 HPYLORI STOOL AG IA: CPT | Performed by: INTERNAL MEDICINE

## 2020-10-30 LAB — H PYLORI AG STL QL IA: NEGATIVE

## 2020-11-04 DIAGNOSIS — E78.5 HYPERLIPIDEMIA WITH TARGET LDL LESS THAN 130: ICD-10-CM

## 2020-11-05 RX ORDER — ATORVASTATIN CALCIUM 20 MG/1
20 TABLET, FILM COATED ORAL DAILY
Refills: 0
Start: 2020-11-05

## 2020-11-10 ENCOUNTER — HOSPITAL ENCOUNTER (OUTPATIENT)
Dept: ULTRASOUND IMAGING | Facility: CLINIC | Age: 65
End: 2020-11-10
Attending: INTERNAL MEDICINE
Payer: COMMERCIAL

## 2020-11-10 ENCOUNTER — HOSPITAL ENCOUNTER (OUTPATIENT)
Dept: BONE DENSITY | Facility: CLINIC | Age: 65
End: 2020-11-10
Attending: INTERNAL MEDICINE
Payer: COMMERCIAL

## 2020-11-10 ENCOUNTER — HOSPITAL ENCOUNTER (OUTPATIENT)
Dept: GENERAL RADIOLOGY | Facility: CLINIC | Age: 65
End: 2020-11-10
Attending: INTERNAL MEDICINE
Payer: COMMERCIAL

## 2020-11-10 DIAGNOSIS — M94.9 DISORDER OF BONE AND CARTILAGE: ICD-10-CM

## 2020-11-10 DIAGNOSIS — R10.11 ABDOMINAL PAIN, RIGHT UPPER QUADRANT: ICD-10-CM

## 2020-11-10 DIAGNOSIS — M89.9 DISORDER OF BONE AND CARTILAGE: ICD-10-CM

## 2020-11-10 DIAGNOSIS — E28.39 OTHER PRIMARY OVARIAN FAILURE: ICD-10-CM

## 2020-11-10 DIAGNOSIS — E03.9 ACQUIRED HYPOTHYROIDISM: ICD-10-CM

## 2020-11-10 PROCEDURE — 255N000001 HC RX 255: Performed by: RADIOLOGY

## 2020-11-10 PROCEDURE — 76536 US EXAM OF HEAD AND NECK: CPT

## 2020-11-10 PROCEDURE — 77080 DXA BONE DENSITY AXIAL: CPT

## 2020-11-10 PROCEDURE — 74240 X-RAY XM UPR GI TRC 1CNTRST: CPT

## 2020-11-10 RX ADMIN — ANTACID/ANTIFLATULENT 4 G: 380; 550; 10; 10 GRANULE, EFFERVESCENT ORAL at 13:55

## 2020-11-15 ENCOUNTER — MYC MEDICAL ADVICE (OUTPATIENT)
Dept: ENDOCRINOLOGY | Facility: CLINIC | Age: 65
End: 2020-11-15

## 2020-11-16 DIAGNOSIS — E03.9 ACQUIRED HYPOTHYROIDISM: ICD-10-CM

## 2020-11-16 RX ORDER — LEVOTHYROXINE SODIUM 125 UG/1
125 TABLET ORAL DAILY
Qty: 30 TABLET | Refills: 3 | Status: SHIPPED | OUTPATIENT
Start: 2020-11-16 | End: 2020-12-29

## 2020-12-27 ENCOUNTER — MYC MEDICAL ADVICE (OUTPATIENT)
Dept: ENDOCRINOLOGY | Facility: CLINIC | Age: 65
End: 2020-12-27

## 2020-12-28 ENCOUNTER — MYC MEDICAL ADVICE (OUTPATIENT)
Dept: ENDOCRINOLOGY | Facility: CLINIC | Age: 65
End: 2020-12-28

## 2020-12-28 DIAGNOSIS — E03.9 ACQUIRED HYPOTHYROIDISM: ICD-10-CM

## 2020-12-29 RX ORDER — LEVOTHYROXINE SODIUM 112 UG/1
112 TABLET ORAL DAILY
Qty: 90 TABLET | Refills: 1 | Status: SHIPPED | OUTPATIENT
Start: 2020-12-29 | End: 2021-03-12

## 2020-12-29 NOTE — TELEPHONE ENCOUNTER
Ambreen,    Thanks for the message.  If the levothyroxine 0.125 mg daily dose is too high, the next lower dose is 0.112 mg tablets.  I suspect the 0.1 mg tablets will be too low of a dose.  I recommend changing to the levothyroxine 0.112 mg tablet daily for 4-6 weeks, having a repeat [facility] lab appointment to re-assess.    Let me know if you are willing to try this alternative plan.    BRANDON Sims MD, MS  Endocrinology  [facility]

## 2020-12-29 NOTE — TELEPHONE ENCOUNTER
Dr Sims - see below     Pt agreeable to reducing dosage to 112mcg daily     Requesting new script with updated dosing    Lalitha SAENZ RN

## 2021-01-06 VITALS — HEIGHT: 67 IN | WEIGHT: 216 LBS | BODY MASS INDEX: 33.9 KG/M2

## 2021-01-06 RX ORDER — RIBOFLAVIN (VITAMIN B2) 100 MG
100 TABLET ORAL 3 TIMES DAILY
COMMUNITY
End: 2022-11-21

## 2021-01-06 RX ORDER — MULTIPLE VITAMINS W/ MINERALS TAB 9MG-400MCG
1 TAB ORAL DAILY
COMMUNITY
End: 2022-11-21

## 2021-01-06 ASSESSMENT — MIFFLIN-ST. JEOR: SCORE: 1549.46

## 2021-01-06 NOTE — PROGRESS NOTES
Ambreen Mora is a 65 year old female who is being evaluated via a billable video visit.      How would you like to obtain your AVS? MyChart  If the video visit is dropped, the invitation should be resent by: Text to cell phone: [phone]  Will anyone else be joining your video visit? No      Video Start Time: 2:31 PM        Video-Visit Details    Type of service:  Video Visit    Video End Time:2:50 PM    Originating Location (pt. Location): Home    Distant Location (provider location):  [facility]     Platform used for Video Visit: Well    Obstructive Sleep Apnea - PAP Follow-Up Visit:    Chief Complaint   Patient presents with     CPAP Follow Up     Pt states that due to Levothyroxine dose had a hard time sleeping even after medication dose was adjusted and would like to discuss.  Pt is wondering if blood work is needed due to the sleep issues after medication doseage was too high.       Ambreen Mora comes in today for follow-up of their moderate sleep apnea, managed with CPAP.     Overall, she rates the experience with PAP as ok. The mask is comfortable. The mask is not leaking.  She is not snoring with the mask on. She is not having gasp arousals.  She is not having significant oral/nasal dryness. The pressure settings are comfortable.     She uses nasal pillows.     Patient retired last summer and absent a need to consistently wake up at a certain time, sleep wake schedules were affected. More recently, changes to dose of thyroid replacement had a significant disruption to sleep initiation and maintenance. Insomnia is getting better.     Bedtime is typically 11 pm. Usually it takes about 20 minutes to fall asleep with the mask on. Wake time is typically 5 -6 am.  Patient is using PAP therapy 7-8 hours per night. The patient is usually getting 7 hours of sleep per night.    She does feel rested in the morning.    Total score - [place]: 6 (1/6/2021  1:00 PM)    ResMed   CPAP  "11 cmH2O download:  30/30 total days of use. 0 nonuse days. 100% days with >4 hours use.  Average use 7 hours 46 minutes per day. Median Leak 2.7 L/min. 95%ile Leak 24.2 L/min. AHI 0.8.       Reviewed by team:  Allergies  Meds            Reviewed by provider:                Problem List:  Patient Active Problem List    Diagnosis Date Noted     Morbid obesity (H) 11/28/2017     Priority: Medium     Left shoulder pain 01/19/2017     Priority: Medium     Acquired hypothyroidism 06/16/2016     Priority: Medium     Vitamin D deficiency disease 08/02/2013     Priority: Medium     External bleeding hemorrhoids 05/12/2013     Priority: Medium     Esophageal dysmotility 05/12/2013     Priority: Medium     EGD 2010 - with dilation       JASMYNE (obstructive sleep apnea) 05/06/2013     Priority: Medium     Hyperlipidemia with target LDL less than 130 05/06/2013     Priority: Medium     Diagnosis updated by automated process. Provider to review and confirm.            Ht 1.689 m (5' 6.5\")   Wt 98 kg (216 lb)   BMI 34.34 kg/m      Impression/Plan:     Moderate sleep apnea.     -  Tolerating PAP well. Daytime symptoms are stable.    - I reviewed data and recording from her CPAP which shows regular compliance and normal AHI.     Plan:     - Continue auto PAP therapy     Insomnia     - There has been sleep initiation and maintenance insomnia contributed by multiple factors, FPC, uncertainty of pandemic and political unrest and changes in dose of thyroid replacement.  Symptoms are improving. We reviewed better stimulus control and behavioral modifications for addressing insomnia.      Ambreen LIEBERMAN Morgan will follow up in about 1 year(s).     I spent a total of 30 minutes for this appointment today which include time spent before, during and after the visit for patient care, counseling and coordination of care.    Dr. Issa Whitehead MD, MD    CC:  Shawn Nolan,   "

## 2021-01-06 NOTE — PATIENT INSTRUCTIONS
Your BMI is Body mass index is 34.34 kg/m .  Weight management is a personal decision.  If you are interested in exploring weight loss strategies, the following discussion covers the approaches that may be successful. Body mass index (BMI) is one way to tell whether you are at a healthy weight, overweight, or obese. It measures your weight in relation to your height.  A BMI of 18.5 to 24.9 is in the healthy range. A person with a BMI of 25 to 29.9 is considered overweight, and someone with a BMI of 30 or greater is considered obese. More than two-thirds of American adults are considered overweight or obese.  Being overweight or obese increases the risk for further weight gain. Excess weight may lead to heart disease and diabetes.  Creating and following plans for healthy eating and physical activity may help you improve your health.  Weight control is part of healthy lifestyle and includes exercise, emotional health, and healthy eating habits. Careful eating habits lifelong are the mainstay of weight control. Though there are significant health benefits from weight loss, long-term weight loss with diet alone may be very difficult to achieve- studies show long-term success with dietary management in less than 10% of people. Attaining a healthy weight may be especially difficult to achieve in those with severe obesity. In some cases, medications, devices and surgical management might be considered.  What can you do?  If you are overweight or obese and are interested in methods for weight loss, you should discuss this with your provider.     Consider reducing daily calorie intake by 500 calories.     Keep a food journal.     Avoiding skipping meals, consider cutting portions instead.    Diet combined with exercise helps maintain muscle while optimizing fat loss. Strength training is particularly important for building and maintaining muscle mass. Exercise helps reduce stress, increase energy, and improves fitness.  Increasing exercise without diet control, however, may not burn enough calories to loose weight.       Start walking three days a week 10-20 minutes at a time    Work towards walking thirty minutes five days a week     Eventually, increase the speed of your walking for 1-2 minutes at time    In addition, we recommend that you review healthy lifestyles and methods for weight loss available through the National Institutes of Health patient information sites:  http://win.niddk.nih.gov/publications/index.htm    And look into health and wellness programs that may be available through your health insurance provider, employer, local community center, or shaggy club.    Weight management plan: Patient was referred to their PCP to discuss a diet and exercise plan.        Your Body mass index is 34.34 kg/m .  Weight management is a personal decision.  If you are interested in exploring weight loss strategies, the following discussion covers the approaches that may be successful. Body mass index (BMI) is one way to tell whether you are at a healthy weight, overweight, or obese. It measures your weight in relation to your height.  A BMI of 18.5 to 24.9 is in the healthy range. A person with a BMI of 25 to 29.9 is considered overweight, and someone with a BMI of 30 or greater is considered obese. More than two-thirds of American adults are considered overweight or obese.  Being overweight or obese increases the risk for further weight gain. Excess weight may lead to heart disease and diabetes.  Creating and following plans for healthy eating and physical activity may help you improve your health.  Weight control is part of healthy lifestyle and includes exercise, emotional health, and healthy eating habits. Careful eating habits lifelong are the mainstay of weight control. Though there are significant health benefits from weight loss, long-term weight loss with diet alone may be very difficult to achieve- studies show long-term  success with dietary management in less than 10% of people. Attaining a healthy weight may be especially difficult to achieve in those with severe obesity. In some cases, medications, devices and surgical management might be considered.  What can you do?  If you are overweight or obese and are interested in methods for weight loss, you should discuss this with your provider.     Consider reducing daily calorie intake by 500 calories.     Keep a food journal.     Avoiding skipping meals, consider cutting portions instead.    Diet combined with exercise helps maintain muscle while optimizing fat loss. Strength training is particularly important for building and maintaining muscle mass. Exercise helps reduce stress, increase energy, and improves fitness. Increasing exercise without diet control, however, may not burn enough calories to loose weight.       Start walking three days a week 10-20 minutes at a time    Work towards walking thirty minutes five days a week     Eventually, increase the speed of your walking for 1-2 minutes at time    In addition, we recommend that you review healthy lifestyles and methods for weight loss available through the National Institutes of Health patient information sites:  http://win.niddk.nih.gov/publications/index.htm    And look into health and wellness programs that may be available through your health insurance provider, employer, local community center, or shaggy club.    {Weight Management Plan -- Delete if patient has a normal BMI:112792}

## 2021-01-07 ENCOUNTER — VIRTUAL VISIT (OUTPATIENT)
Dept: SLEEP MEDICINE | Facility: CLINIC | Age: 66
End: 2021-01-07
Payer: COMMERCIAL

## 2021-01-07 DIAGNOSIS — G47.33 OSA (OBSTRUCTIVE SLEEP APNEA): Primary | ICD-10-CM

## 2021-01-07 PROCEDURE — 99214 OFFICE O/P EST MOD 30 MIN: CPT | Mod: 95 | Performed by: INTERNAL MEDICINE

## 2021-03-09 DIAGNOSIS — E03.9 HYPOTHYROIDISM, UNSPECIFIED TYPE: ICD-10-CM

## 2021-03-09 LAB — T3FREE SERPL-MCNC: 2.1 PG/ML (ref 2.3–4.2)

## 2021-03-09 PROCEDURE — 84481 FREE ASSAY (FT-3): CPT | Performed by: INTERNAL MEDICINE

## 2021-03-09 PROCEDURE — 84443 ASSAY THYROID STIM HORMONE: CPT | Performed by: INTERNAL MEDICINE

## 2021-03-09 PROCEDURE — 84439 ASSAY OF FREE THYROXINE: CPT | Performed by: INTERNAL MEDICINE

## 2021-03-09 PROCEDURE — 36415 COLL VENOUS BLD VENIPUNCTURE: CPT | Performed by: INTERNAL MEDICINE

## 2021-03-10 ENCOUNTER — MYC MEDICAL ADVICE (OUTPATIENT)
Dept: FAMILY MEDICINE | Facility: CLINIC | Age: 66
End: 2021-03-10

## 2021-03-10 LAB
T4 FREE SERPL-MCNC: 1.12 NG/DL (ref 0.76–1.46)
TSH SERPL DL<=0.005 MIU/L-ACNC: 0.09 MU/L (ref 0.4–4)

## 2021-03-11 ENCOUNTER — MYC MEDICAL ADVICE (OUTPATIENT)
Dept: FAMILY MEDICINE | Facility: CLINIC | Age: 66
End: 2021-03-11

## 2021-03-11 DIAGNOSIS — E03.9 ACQUIRED HYPOTHYROIDISM: Primary | ICD-10-CM

## 2021-03-11 NOTE — TELEPHONE ENCOUNTER
PCP - see response below     Pt asking if you will take over her thyroid medications /she'd be willing to schedule a visit to discuss    Lalitha SAENZ RN

## 2021-03-12 ENCOUNTER — MYC MEDICAL ADVICE (OUTPATIENT)
Dept: FAMILY MEDICINE | Facility: CLINIC | Age: 66
End: 2021-03-12

## 2021-03-12 RX ORDER — LEVOTHYROXINE SODIUM 100 UG/1
100 TABLET ORAL DAILY
Qty: 90 TABLET | Refills: 3 | Status: SHIPPED | OUTPATIENT
Start: 2021-03-12 | End: 2021-10-26

## 2021-03-12 NOTE — TELEPHONE ENCOUNTER
Call patient to see how he's doing ,patient said the he went to the ER last night  Patient denied Dizziness ,chest pain ,shortness of breath he said the he feel good for now  Last time the he check the sugar is was 91   Advise patient the if he feel any symptoms to call the clinic and schedule appt  He has appt schedule with Dr Blackburn on Tuesday July 23  Dr Sims - see below     PCP entered result notes in thyroid labs but pt asking for your input since you ordered labs     Thank you   Lalitha ASENZ RN

## 2021-03-12 NOTE — TELEPHONE ENCOUNTER
PCP - see below     Pt requesting you take over managing thyroid meds     And asking that you advise on recent labs that indicate thyroid is overactive (does she need a dosage adjustment?)    Lalitha SAENZ RN

## 2021-03-14 NOTE — TELEPHONE ENCOUNTER
Messages noted.  I don't know why so many people are interacting with these thyroid lab tests which I ordered for this patient that I have seen for thyroid management.  I have now sent a detailed MyChart lab letter today.    BRANDON Sims MD, MS  Endocrinology  [facility]

## 2021-03-17 ENCOUNTER — MYC MEDICAL ADVICE (OUTPATIENT)
Dept: ENDOCRINOLOGY | Facility: CLINIC | Age: 66
End: 2021-03-17

## 2021-04-27 DIAGNOSIS — E03.9 ACQUIRED HYPOTHYROIDISM: ICD-10-CM

## 2021-04-27 LAB
T4 FREE SERPL-MCNC: 1.04 NG/DL (ref 0.76–1.46)
TSH SERPL DL<=0.005 MIU/L-ACNC: 0.15 MU/L (ref 0.4–4)

## 2021-04-27 PROCEDURE — 84439 ASSAY OF FREE THYROXINE: CPT | Performed by: INTERNAL MEDICINE

## 2021-04-27 PROCEDURE — 36415 COLL VENOUS BLD VENIPUNCTURE: CPT | Performed by: INTERNAL MEDICINE

## 2021-04-27 PROCEDURE — 84443 ASSAY THYROID STIM HORMONE: CPT | Performed by: INTERNAL MEDICINE

## 2021-04-28 RX ORDER — LEVOTHYROXINE SODIUM 88 UG/1
88 TABLET ORAL DAILY
Qty: 90 TABLET | Refills: 3 | Status: SHIPPED | OUTPATIENT
Start: 2021-04-28 | End: 2022-04-06

## 2021-05-22 ENCOUNTER — NURSE TRIAGE (OUTPATIENT)
Dept: NURSING | Facility: CLINIC | Age: 66
End: 2021-05-22

## 2021-05-22 NOTE — TELEPHONE ENCOUNTER
FNA triage call :   Presenting problem : Pt called , removed a deer tick from the area by NUSRAT herrera- 24 hours ago and has dark - red  Dime size spot  at site of bite . [place]20[place] .   Currently : feels fine , Tick bite  site is not sore  And  Is  flat . Would like Rx called into  [facility] in [place] on [address] [phone]. Phone:   Pt phone = [phone]    PCN allergy with hive after high doses  and sulfa -allergy with hives .   Guideline used : tick bite A AH.   Disposition and recommendations :@ 843  And 853am smart web paged Dr REYNA Dutta to [facility] [phone] to ask for Rx for deer tick bite .    COVID 19 Nurse Triage Plan/Patient Instructions  Please be aware that novel coronavirus (COVID-19) may be circulating in the community. If you develop symptoms such as fever, cough, or SOB or if you have concerns about the presence of another infection including coronavirus (COVID-19), please contact your health care provider or visit [email or web address].     Disposition/Instructions / Dr Rayo Osorio replied  and ordered and FNA verbally call in Rx for  Doxycycline 100mg BID for 2 weeks  And gave his ESTEBAN BW [id number] to  [facility] pharmacist Tim FOWLER@ 906 am. [facility] called Pt to advise Rx sent .       Home care recommended. Follow home care protocol based instructions.    Thank you for taking steps to prevent the spread of this virus.  o Limit your contact with others.  o Wear a simple mask to cover your cough.  o Wash your hands well and often.    Caller verbalizes understanding and denies further questions and will call back if further symptoms to triage or questions  . Estee Ravi RN  - [place] Nurse Advisor     Reason for Disposition    [1] Probable deer tick AND [2] attached > 36 hours (or tick appears swollen, not flat) AND [3] occurred in an area where Lyme disease is common    Additional Information    Negative: Sounds like a life-threatening  emergency to the triager    Negative: Not a tick bite    Negative: [1] 2 to 14 days following tick bite AND [2] severe headache with fever occurs    Negative: [1] 2 to 14 days following tick bite AND [2] widespread rash with fever occurs    Negative: Patient sounds very sick or weak to the triager    Negative: [1] Fever AND [2] red area    Negative: [1] Fever AND [2] area is very tender to touch    Negative: [1] Red streak or red line AND [2] length > 2 inches (5 cm)    Negative: Can't remove live tick (after trying Care Advice)    Negative: Can't remove tick's head that was broken off in the skin (after trying Care Advice)    Negative: [1] 2 to 14 days following tick bite AND [2] fever AND [3] no rash or headache    Negative: [1] 2 to 14 days following tick bite AND [2] widespread rash or headache AND [3] no fever    Negative: [1] Red or very tender (to touch) area AND [2] started over 24 hours after the bite    Negative: Red ring or bull's-eye rash occurs at tick bite    Protocols used: TICK BITE-A-

## 2021-05-24 ENCOUNTER — NURSE TRIAGE (OUTPATIENT)
Dept: FAMILY MEDICINE | Facility: CLINIC | Age: 66
End: 2021-05-24

## 2021-05-24 NOTE — TELEPHONE ENCOUNTER
"Called patient after reading  message 5/22/21.  Patient had called FNA 5/22/21 and on-call provider had RN call verbal order for Doxycycline to patient's pharmacy as prophylactic measure.   Patient reports area of tick bite \"already looking much better\".     Answer Assessment - Initial Assessment Questions  1. TYPE of TICK: \"Is it a wood tick or a deer tick?\" If unsure, ask: \"What size was the tick?\" \"Did it look more like a watermelon seed or a poppy seed?\"       *No Answer*  2. LOCATION: \"Where is the tick bite located?\"       *No Answer*  3. ONSET: \"How long do you think the tick was attached before you removed it?\" (Hours or days)       *No Answer*  4. TETANUS: \"When was the last tetanus booster?\"       *No Answer*  5. PREGNANCY: \"Is there any chance you are pregnant?\" \"When was your last menstrual period?\"      *No Answer*    Protocols used: TICK BITE-A-[place]    Advised to contact clinic or seek medical care (Urgent Care or ED) if symptoms persist or worsen.  Caller agreed with advise.        Britany GODOY, RN      May 24, 2021  2:15 PM        "

## 2021-06-07 DIAGNOSIS — E03.9 ACQUIRED HYPOTHYROIDISM: ICD-10-CM

## 2021-06-07 LAB — TSH SERPL DL<=0.005 MIU/L-ACNC: 0.44 MU/L (ref 0.4–4)

## 2021-06-07 PROCEDURE — 36415 COLL VENOUS BLD VENIPUNCTURE: CPT | Performed by: INTERNAL MEDICINE

## 2021-06-07 PROCEDURE — 84443 ASSAY THYROID STIM HORMONE: CPT | Performed by: INTERNAL MEDICINE

## 2021-09-09 ENCOUNTER — TELEPHONE (OUTPATIENT)
Dept: FAMILY MEDICINE | Facility: CLINIC | Age: 66
End: 2021-09-09

## 2021-09-09 DIAGNOSIS — R53.83 OTHER FATIGUE: Primary | ICD-10-CM

## 2021-09-09 DIAGNOSIS — E03.9 ACQUIRED HYPOTHYROIDISM: ICD-10-CM

## 2021-09-09 NOTE — TELEPHONE ENCOUNTER
Reason for Call:  Same Day Appointment, Requested Provider:  Dr Nolan    PCP: Shawn Nolan    Reason for visit: was put on new thyroid med and having fatigue since    Duration of symptoms:     Have you been treated for this in the past? No    Additional comments: is wondering if Dr Nolan would work her in asap f2f    Can we leave a detailed message on this number? YES    Phone number patient can be reached at: Home number on file [phone] (home)    Best Time: any    Call taken on 9/9/2021 at 2:43 PM by April Silver

## 2021-10-14 DIAGNOSIS — E78.5 HYPERLIPIDEMIA WITH TARGET LDL LESS THAN 130: ICD-10-CM

## 2021-10-18 RX ORDER — ATORVASTATIN CALCIUM 20 MG/1
TABLET, FILM COATED ORAL
Qty: 90 TABLET | Refills: 0 | Status: SHIPPED | OUTPATIENT
Start: 2021-10-18 | End: 2022-01-24

## 2021-10-18 NOTE — TELEPHONE ENCOUNTER
Routing refill request to provider for review/approval because:  Labs not current:  LDL      LDL Cholesterol Calculated   Date Value Ref Range Status   07/15/2020 64 <100 mg/dL Final     Comment:     Desirable:       <100 mg/dl

## 2021-10-25 ASSESSMENT — ENCOUNTER SYMPTOMS
CHILLS: 0
SHORTNESS OF BREATH: 0
NERVOUS/ANXIOUS: 0
BREAST MASS: 0
MYALGIAS: 1
EYE PAIN: 0
FREQUENCY: 0
JOINT SWELLING: 0
PALPITATIONS: 0
COUGH: 0
DYSURIA: 0
DIARRHEA: 0
WEAKNESS: 0
HEARTBURN: 0
HEMATURIA: 0
DIZZINESS: 0
FEVER: 0
HEADACHES: 0
ABDOMINAL PAIN: 0
PARESTHESIAS: 0
HEMATOCHEZIA: 0
CONSTIPATION: 0
SORE THROAT: 0
NAUSEA: 0
ARTHRALGIAS: 1

## 2021-10-25 ASSESSMENT — ACTIVITIES OF DAILY LIVING (ADL): CURRENT_FUNCTION: NO ASSISTANCE NEEDED

## 2021-10-26 ENCOUNTER — OFFICE VISIT (OUTPATIENT)
Dept: FAMILY MEDICINE | Facility: CLINIC | Age: 66
End: 2021-10-26
Payer: COMMERCIAL

## 2021-10-26 VITALS
OXYGEN SATURATION: 99 % | HEART RATE: 66 BPM | SYSTOLIC BLOOD PRESSURE: 139 MMHG | RESPIRATION RATE: 16 BRPM | DIASTOLIC BLOOD PRESSURE: 85 MMHG | BODY MASS INDEX: 37.61 KG/M2 | TEMPERATURE: 96.9 F | HEIGHT: 66 IN | WEIGHT: 234 LBS

## 2021-10-26 DIAGNOSIS — Z00.00 ENCOUNTER FOR ANNUAL WELLNESS VISIT (AWV) IN MEDICARE PATIENT: Primary | ICD-10-CM

## 2021-10-26 DIAGNOSIS — E66.01 MORBID OBESITY (H): ICD-10-CM

## 2021-10-26 DIAGNOSIS — L30.9 ECZEMA, UNSPECIFIED TYPE: ICD-10-CM

## 2021-10-26 DIAGNOSIS — Z23 NEED FOR VACCINATION: ICD-10-CM

## 2021-10-26 DIAGNOSIS — G47.33 OSA (OBSTRUCTIVE SLEEP APNEA): ICD-10-CM

## 2021-10-26 DIAGNOSIS — E03.9 ACQUIRED HYPOTHYROIDISM: ICD-10-CM

## 2021-10-26 DIAGNOSIS — E55.9 VITAMIN D DEFICIENCY DISEASE: ICD-10-CM

## 2021-10-26 DIAGNOSIS — H91.91 HEARING LOSS OF RIGHT EAR, UNSPECIFIED HEARING LOSS TYPE: ICD-10-CM

## 2021-10-26 DIAGNOSIS — E78.5 HYPERLIPIDEMIA WITH TARGET LDL LESS THAN 130: ICD-10-CM

## 2021-10-26 DIAGNOSIS — K22.4 ESOPHAGEAL DYSMOTILITY: ICD-10-CM

## 2021-10-26 DIAGNOSIS — R53.83 OTHER FATIGUE: ICD-10-CM

## 2021-10-26 LAB
ERYTHROCYTE [DISTWIDTH] IN BLOOD BY AUTOMATED COUNT: 14.5 % (ref 10–15)
HCT VFR BLD AUTO: 42.7 % (ref 35–47)
HGB BLD-MCNC: 14 G/DL (ref 11.7–15.7)
MCH RBC QN AUTO: 28.7 PG (ref 26.5–33)
MCHC RBC AUTO-ENTMCNC: 32.8 G/DL (ref 31.5–36.5)
MCV RBC AUTO: 88 FL (ref 78–100)
PLATELET # BLD AUTO: 222 10E3/UL (ref 150–450)
RBC # BLD AUTO: 4.87 10E6/UL (ref 3.8–5.2)
VIT B12 SERPL-MCNC: 401 PG/ML (ref 193–986)
WBC # BLD AUTO: 5.8 10E3/UL (ref 4–11)

## 2021-10-26 PROCEDURE — 85027 COMPLETE CBC AUTOMATED: CPT | Performed by: INTERNAL MEDICINE

## 2021-10-26 PROCEDURE — 80061 LIPID PANEL: CPT | Performed by: INTERNAL MEDICINE

## 2021-10-26 PROCEDURE — 82607 VITAMIN B-12: CPT | Performed by: INTERNAL MEDICINE

## 2021-10-26 PROCEDURE — 80053 COMPREHEN METABOLIC PANEL: CPT | Performed by: INTERNAL MEDICINE

## 2021-10-26 PROCEDURE — 90732 PPSV23 VACC 2 YRS+ SUBQ/IM: CPT | Performed by: INTERNAL MEDICINE

## 2021-10-26 PROCEDURE — 82306 VITAMIN D 25 HYDROXY: CPT | Performed by: INTERNAL MEDICINE

## 2021-10-26 PROCEDURE — G0438 PPPS, INITIAL VISIT: HCPCS | Performed by: INTERNAL MEDICINE

## 2021-10-26 PROCEDURE — G0009 ADMIN PNEUMOCOCCAL VACCINE: HCPCS | Performed by: INTERNAL MEDICINE

## 2021-10-26 PROCEDURE — 99214 OFFICE O/P EST MOD 30 MIN: CPT | Mod: 25 | Performed by: INTERNAL MEDICINE

## 2021-10-26 PROCEDURE — 84443 ASSAY THYROID STIM HORMONE: CPT | Performed by: INTERNAL MEDICINE

## 2021-10-26 PROCEDURE — 36415 COLL VENOUS BLD VENIPUNCTURE: CPT | Performed by: INTERNAL MEDICINE

## 2021-10-26 RX ORDER — FLUOCINOLONE ACETONIDE 0.1 MG/ML
SOLUTION TOPICAL 2 TIMES DAILY
Qty: 60 ML | Refills: 1 | Status: SHIPPED | OUTPATIENT
Start: 2021-10-26 | End: 2022-11-21

## 2021-10-26 ASSESSMENT — MIFFLIN-ST. JEOR: SCORE: 1619.76

## 2021-10-26 ASSESSMENT — ACTIVITIES OF DAILY LIVING (ADL): CURRENT_FUNCTION: NO ASSISTANCE NEEDED

## 2021-10-26 NOTE — PATIENT INSTRUCTIONS
Patient Education   Personalized Prevention Plan  You are due for the preventive services outlined below.  Your care team is available to assist you in scheduling these services.  If you have already completed any of these items, please share that information with your care team to update in your medical record.  Health Maintenance Due   Topic Date Due     ANNUAL REVIEW OF HM ORDERS  Never done     Hepatitis C Screening  Never done     Basic Metabolic Panel  07/15/2021     Cholesterol Lab  07/15/2021     FALL RISK ASSESSMENT  10/23/2021     Pneumococcal Vaccine (2 of 2 - PPSV23) 10/26/2021       Signs of Hearing Loss      Hearing much better with one ear can be a sign of hearing loss.   Hearing loss is a problem shared by many people. In fact, it is one of the most common health problems, particularly as people age. Most people age 65 and older have some hearing loss. By age 80, almost everyone does. Hearing loss often occurs slowly over the years. So you may not realize your hearing has gotten worse.  Have your hearing checked  Call your healthcare provider if you:    Have to strain to hear normal conversation    Have to watch other people s faces very carefully to follow what they re saying    Need to ask people to repeat what they ve said    Often misunderstand what people are saying    Turn the volume of the television or radio up so high that others complain    Feel that people are mumbling when they re talking to you    Find that the effort to hear leaves you feeling tired and irritated    Notice, when using the phone, that you hear better with one ear than the other  [employer]Well last reviewed this educational content on 1/1/2020 2000-2021 [employer]. All rights reserved. This information is not intended as a substitute for professional medical care. Always follow your healthcare professional's instructions.

## 2021-10-26 NOTE — NURSING NOTE
Prior to immunization administration, verified patients identity using patient s name and date of birth. Please see Immunization Activity for additional information.     Screening Questionnaire for Adult Immunization    Are you sick today?   No   Do you have allergies to medications, food, a vaccine component or latex?   Yes   Have you ever had a serious reaction after receiving a vaccination?   No   Do you have a long-term health problem with heart, lung, kidney, or metabolic disease (e.g., diabetes), asthma, a blood disorder, no spleen, complement component deficiency, a cochlear implant, or a spinal fluid leak?  Are you on long-term aspirin therapy?   No   Do you have cancer, leukemia, HIV/AIDS, or any other immune system problem?   No   Do you have a parent, brother, or sister with an immune system problem?   No   In the past 3 months, have you taken medications that affect  your immune system, such as prednisone, other steroids, or anticancer drugs; drugs for the treatment of rheumatoid arthritis, Crohn s disease, or psoriasis; or have you had radiation treatments?   No   Have you had a seizure, or a brain or other nervous system problem?   No   During the past year, have you received a transfusion of blood or blood    products, or been given immune (gamma) globulin or antiviral drug?   No   For women: Are you pregnant or is there a chance you could become       pregnant during the next month?   No   Have you received any vaccinations in the past 4 weeks?   Yes     Immunization questionnaire was positive for at least one answer.  Notified .        Per orders of Dr. Nolan, injection of Pneumovax given by Lolita Jean-Baptiste CMA. Patient instructed to remain in clinic for 15 minutes afterwards, and to report any adverse reaction to me immediately.       Screening performed by Lolita Jean-Baptiste CMA on 10/26/2021 at 10:43 AM.

## 2021-10-26 NOTE — PROGRESS NOTES
"SUBJECTIVE:   Ambreen Mora is a 66 year old female who presents for Preventive Visit.  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  Yes,  Visual Acuity:  Right Eye: 20/25   Left Eye: 20/20  Both Eyes: 20/20 with glasses    Healthy Habits:     In general, how would you rate your overall health?  Good    Frequency of exercise:  4-5 days/week    Duration of exercise:  30-45 minutes    Do you usually eat at least 4 servings of fruit and vegetables a day, include whole grains    & fiber and avoid regularly eating high fat or \"junk\" foods?  Yes    Taking medications regularly:  Yes    Barriers to taking medications:  None    Ability to successfully perform activities of daily living:  No assistance needed    Home Safety:  Throw rugs in the hallway and lack of grab bars in the bathroom    Hearing Impairment:  Need to ask people to speak up or repeat themselves    In the past 6 months, have you been bothered by leaking of urine?  No    In general, how would you rate your overall mental or emotional health?  Excellent      PHQ-2 Total Score: 0    Additional concerns today:  Yes    Do you feel safe in your environment? Yes    Have you ever done Advance Care Planning? (For example, a Health Directive, POLST, or a discussion with a medical provider or your loved ones about your wishes): No, advance care planning information given to patient to review.  Patient plans to discuss their wishes with loved ones or provider.         Fall risk  Fallen 2 or more times in the past year?: No  Any fall with injury in the past year?: No    Cognitive Screening   1) Repeat " 3 items (Leader, Season, Table)    2) Clock draw: NORMAL  3) 3 item recall: Recalls 3 objects  Results: 3 items recalled: COGNITIVE IMPAIRMENT LESS LIKELY    Mini-CogTM Copyright S [employer]. Licensed by the author for use in [facility]; reprinted with permission ([email or web address]). All rights reserved.      Do you have sleep apnea, excessive snoring or daytime drowsiness?: yes    Reviewed and updated as needed this visit by clinical staff  Tobacco  Allergies  Meds  Problems    Fam Hx          Reviewed and updated as needed this visit by Provider   Allergies  Meds  Problems    Fam Hx         Social History     Tobacco Use     Smoking status: Never Smoker     Smokeless tobacco: Never Used   Substance Use Topics     Alcohol use: No     Alcohol/week: 0.0 standard drinks     If you drink alcohol do you typically have >3 drinks per day or >7 drinks per week? No    No flowsheet data found.            Current providers sharing in care for this patient include:   Patient Care Team:  Shawn Nolan MD as PCP - General (Internal Medicine)  Shawn Nolan MD as Assigned PCP  Rubio Sims MD as Assigned Endocrinology Provider  Issa Whitehead MD as Assigned Sleep Provider    The following health maintenance items are reviewed in Epic and correct as of today:  Health Maintenance Due   Topic Date Due     ANNUAL REVIEW OF HM ORDERS  Never done     BMP  07/15/2021     LIPID  07/15/2021     FALL RISK ASSESSMENT  10/23/2021     Pneumococcal Vaccine: 65+ Years (2 of 2 - PPSV23) 10/26/2021     BP Readings from Last 3 Encounters:   10/26/21 139/85   10/14/19 117/77   07/09/19 136/82    Wt Readings from Last 3 Encounters:   10/26/21 106.1 kg (234 lb)   01/06/21 98 kg (216 lb)   10/14/19 97.5 kg (215 lb)                  Patient Active Problem List   Diagnosis     JASMYNE (obstructive sleep apnea)     Hyperlipidemia with target LDL less than 130     External bleeding hemorrhoids     Esophageal dysmotility     Vitamin D  deficiency disease     Acquired hypothyroidism     Left shoulder pain     Morbid obesity (H)     Past Surgical History:   Procedure Laterality Date     BLADDER SURGERY  19    honeycomb     COLONOSCOPY  2005     GENITOURINARY SURGERY  1974     HYSTERECTOMY, PAP NO LONGER INDICATED  1980s       Social History     Tobacco Use     Smoking status: Never Smoker     Smokeless tobacco: Never Used   Substance Use Topics     Alcohol use: No     Alcohol/week: 0.0 standard drinks     Family History   Problem Relation Age of Onset     Hearing Loss Mother         mennier's disease     Thyroid Disease Mother      Thyroid Disease Father      Brain Tumor Brother         Pituitary     Thyroid nodules Brother      Valvular heart disease Brother      Hyperlipidemia Sister      Thyroid Disease Sister      Obesity Sister      Hyperlipidemia Sister      Thyroid Disease Sister      Obesity Sister      Mental Illness Niece          Current Outpatient Medications   Medication Sig Dispense Refill     atorvastatin (LIPITOR) 20 MG tablet TAKE 1 TABLET BY MOUTH EVERY DAY 90 tablet 0     cholecalciferol 1000 UNITS TABS Take 2,000 Units by mouth daily.       co-enzyme Q-10 (COQ10) 100 MG CAPS Take  by mouth daily.       Fish Oil OIL 2-4 daily 1000mg.        fluocinolone (SYNALAR) 0.01 % solution Apply topically 2 times daily 60 mL 1     levothyroxine (SYNTHROID/LEVOTHROID) 88 MCG tablet Take 1 tablet (88 mcg) by mouth daily 90 tablet 3     magnesium 250 MG tablet Take 1 tablet by mouth daily 30 tablet      multivitamin w/minerals (MULTI-VITAMIN) tablet Take 1 tablet by mouth daily       TURMERIC PO        vitamin C (ASCORBIC ACID) 100 MG tablet Take 100 mg by mouth 3 times daily       VITAMIN D PO Take 1 tablet by mouth daily           Breast CA Risk Assessment (FHS-7) 10/25/2021   Do you have a family history of breast, colon, or ovarian cancer? No / Unknown         Mammogram Screening: Recommended annual mammography  Pertinent mammograms are  "reviewed under the imaging tab.    Review of Systems  10 point ROS of systems including Constitutional, Eyes, Respiratory, Cardiovascular, Gastroenterology, Genitourinary, Integumentary, Muscularskeletal, Psychiatric were all negative except for pertinent positives noted in my HPI.      OBJECTIVE:   /85 (BP Location: Right arm, Patient Position: Sitting, Cuff Size: Adult Large)   Pulse 66   Temp 96.9  F (36.1  C) (Temporal)   Resp 16   Ht 1.679 m (5' 6.1\")   Wt 106.1 kg (234 lb)   SpO2 99%   BMI 37.65 kg/m   Estimated body mass index is 37.65 kg/m  as calculated from the following:    Height as of this encounter: 1.679 m (5' 6.1\").    Weight as of this encounter: 106.1 kg (234 lb).  Physical Exam  GENERAL APPEARANCE: healthy, alert and no distress  EYES: Eyes grossly normal to inspection, PERRL and conjunctivae and sclerae normal  HENT: ear canals and TM's normal, nose and mouth without ulcers or lesions, oropharynx clear and oral mucous membranes moist  NECK: no adenopathy, no asymmetry, masses, or scars and thyroid normal to palpation  RESP: lungs clear to auscultation - no rales, rhonchi or wheezes  BREAST: normal without masses, tenderness or nipple discharge and no palpable axillary masses or adenopathy  CV: regular rate and rhythm, normal S1 S2, no S3 or S4, no murmur, click or rub, no peripheral edema and peripheral pulses strong  ABDOMEN: soft, nontender, no hepatosplenomegaly, no masses and bowel sounds normal  MS: no musculoskeletal defects are noted and gait is age appropriate without ataxia  SKIN: Multiple benign lesions and freckles  NEURO: Normal strength and tone, sensory exam grossly normal, mentation intact and speech normal  PSYCH: mentation appears normal and affect normal/bright    TSH   Date Value Ref Range Status   06/07/2021 0.44 0.40 - 4.00 mU/L Final         ASSESSMENT / PLAN:   Ambreen was seen today for physical.    Diagnoses and all orders for this visit:    Encounter for " "annual wellness visit (AWV) in Medicare patient  This is a very nice 66 years old lady who will need annual mammogram and is due this year  DEXA scan was done last year  Colonoscopy was done in 2017  Up to date on immunizations except Pneumovax booster needed  Skin check will be needed every 2 years and eye exam every 2 years  Needs hearing test  Acquired hypothyroidism  -     TSH with free T4 reflex; Future  Strong family history  Vitamin D deficiency disease  -     Vitamin D Deficiency; Future  -     CBC with Platelets  ; Future  On vitamin D  Morbid obesity (H)  -     CBC with Platelets  ; Future  We will consider Metformin or Ozempic in addition to weight watchers  JASMYNE (obstructive sleep apnea)  Patient will see speech sleep specialist if the fatigue continues  Esophageal dysmotility  Occasional dysphagia overall stable  Hyperlipidemia with target LDL less than 130  -     Lipid panel reflex to direct LDL Fasting; Future  -     Comprehensive metabolic panel; Future    Hearing loss of right ear, unspecified hearing loss type  -     Otolaryngology Referral; Future  Patient will need work-up for this  Eczema, unspecified type  -     fluocinolone (SYNALAR) 0.01 % solution; Apply topically 2 times daily  Stop using Q-tips and please keep the area dry  Other fatigue  -     Vitamin B12; Future  -     SLEEP EVALUATION & MANAGEMENT REFERRAL - ADULT -; Future  Patient might need Metformin or Ozempic for weight loss  Need for vaccination  -     Pneumococcal vaccine 23 valent PPSV23  (Pneumovax) [70764]  -     ADMIN MEDICARE: Pneumococcal Vaccine ()          COUNSELING:  Reviewed preventive health counseling, as reflected in patient instructions       Immunizations    Vaccinated for: Pneumococcal          Estimated body mass index is 37.65 kg/m  as calculated from the following:    Height as of this encounter: 1.679 m (5' 6.1\").    Weight as of this encounter: 106.1 kg (234 lb).    Weight management plan: Discussed " healthy diet and exercise guidelines    She reports that she has never smoked. She has never used smokeless tobacco.      Appropriate preventive services were discussed with this patient, including applicable screening as appropriate for cardiovascular disease, diabetes, osteopenia/osteoporosis, and glaucoma.  As appropriate for age/gender, discussed screening for colorectal cancer, r, breast cancer, and cervical cancer. Checklist reviewing preventive services available has been given to the patient.    Reviewed patients plan of care and provided an AVS. The Basic Care Plan (routine screening as documented in Health Maintenance) for Ambreen meets the Care Plan requirement. This Care Plan has been established and reviewed with the Patient.    Counseling Resources:  ATP IV Guidelines  Pooled Cohorts Equation Calculator  Breast Cancer Risk Calculator  Breast Cancer: Medication to Reduce Risk  FRAX Risk Assessment  ICSI Preventive Guidelines  Dietary Guidelines for Americans, 2010  USDA's MyPlate  ASA Prophylaxis  Lung CA Screening    Shawn Nolan MD  [facility]    Identified Health Risks:

## 2021-10-26 NOTE — PROGRESS NOTES
"    The patient was provided with written information regarding signs of hearing loss.  Answers for HPI/ROS submitted by the patient on 10/25/2021  In general, how would you rate your overall physical health?: good  Frequency of exercise:: 4-5 days/week  Do you usually eat at least 4 servings of fruit and vegetables a day, include whole grains & fiber, and avoid regularly eating high fat or \"junk\" foods? : Yes  Taking medications regularly:: Yes  Activities of Daily Living: no assistance needed  Home safety: throw rugs in the hallway, lack of grab bars in the bathroom  Hearing Impairment:: need to ask people to speak up or repeat themselves  In the past 6 months, have you been bothered by leaking of urine?: No  abdominal pain: No  Blood in stool: No  Blood in urine: No  chest pain: No  chills: No  congestion: No  constipation: No  cough: No  diarrhea: No  dizziness: No  eye pain: No  nervous/anxious: No  fever: No  frequency: No  genital sores: No  headaches: No  hearing loss: Yes  heartburn: No  arthralgias: Yes  joint swelling: No  peripheral edema: No  mood changes: No  myalgias: Yes  nausea: No  dysuria: No  palpitations: No  Skin sensation changes: No  sore throat: No  urgency: No  rash: No  shortness of breath: No  visual disturbance: No  weakness: No  pelvic pain: No  vaginal bleeding: No  vaginal discharge: No  tenderness: No  breast mass: No  breast discharge: No  In general, how would you rate your overall mental or emotional health?: excellent  Additional concerns today:: Yes  Duration of exercise:: 30-45 minutes        "

## 2021-10-27 LAB
ALBUMIN SERPL-MCNC: 3.5 G/DL (ref 3.4–5)
ALP SERPL-CCNC: 89 U/L (ref 40–150)
ALT SERPL W P-5'-P-CCNC: 27 U/L (ref 0–50)
ANION GAP SERPL CALCULATED.3IONS-SCNC: 8 MMOL/L (ref 3–14)
AST SERPL W P-5'-P-CCNC: 13 U/L (ref 0–45)
BILIRUB SERPL-MCNC: 0.4 MG/DL (ref 0.2–1.3)
BUN SERPL-MCNC: 11 MG/DL (ref 7–30)
CALCIUM SERPL-MCNC: 8.6 MG/DL (ref 8.5–10.1)
CHLORIDE BLD-SCNC: 107 MMOL/L (ref 94–109)
CHOLEST SERPL-MCNC: 171 MG/DL
CO2 SERPL-SCNC: 23 MMOL/L (ref 20–32)
CREAT SERPL-MCNC: 0.83 MG/DL (ref 0.52–1.04)
DEPRECATED CALCIDIOL+CALCIFEROL SERPL-MC: 49 UG/L (ref 20–75)
FASTING STATUS PATIENT QL REPORTED: YES
GFR SERPL CREATININE-BSD FRML MDRD: 74 ML/MIN/1.73M2
GLUCOSE BLD-MCNC: 94 MG/DL (ref 70–99)
HDLC SERPL-MCNC: 44 MG/DL
LDLC SERPL CALC-MCNC: 94 MG/DL
NONHDLC SERPL-MCNC: 127 MG/DL
POTASSIUM BLD-SCNC: 3.8 MMOL/L (ref 3.4–5.3)
PROT SERPL-MCNC: 7.2 G/DL (ref 6.8–8.8)
SODIUM SERPL-SCNC: 138 MMOL/L (ref 133–144)
TRIGL SERPL-MCNC: 167 MG/DL
TSH SERPL DL<=0.005 MIU/L-ACNC: 1.25 MU/L (ref 0.4–4)

## 2022-03-02 DIAGNOSIS — G47.33 OSA (OBSTRUCTIVE SLEEP APNEA): Primary | ICD-10-CM

## 2022-03-11 ENCOUNTER — HOSPITAL ENCOUNTER (OUTPATIENT)
Dept: MAMMOGRAPHY | Facility: CLINIC | Age: 67
Discharge: HOME | End: 2022-03-11
Attending: INTERNAL MEDICINE | Admitting: INTERNAL MEDICINE
Payer: COMMERCIAL

## 2022-03-11 DIAGNOSIS — Z12.31 VISIT FOR SCREENING MAMMOGRAM: ICD-10-CM

## 2022-03-11 PROCEDURE — 77067 SCR MAMMO BI INCL CAD: CPT

## 2022-04-05 DIAGNOSIS — E03.9 ACQUIRED HYPOTHYROIDISM: ICD-10-CM

## 2022-04-06 RX ORDER — LEVOTHYROXINE SODIUM 88 UG/1
TABLET ORAL
Qty: 90 TABLET | Refills: 1 | Status: SHIPPED | OUTPATIENT
Start: 2022-04-06 | End: 2022-11-15

## 2022-04-06 NOTE — TELEPHONE ENCOUNTER
Prescription approved per [facility] Refill Protocol.    Jannette Goodrich RN  [facility]

## 2022-04-12 ENCOUNTER — TRANSFERRED RECORDS (OUTPATIENT)
Dept: HEALTH INFORMATION MANAGEMENT | Facility: CLINIC | Age: 67
End: 2022-04-12
Payer: COMMERCIAL

## 2022-08-22 ENCOUNTER — TRANSFERRED RECORDS (OUTPATIENT)
Dept: FAMILY MEDICINE | Facility: CLINIC | Age: 67
End: 2022-08-22

## 2022-09-03 ENCOUNTER — HEALTH MAINTENANCE LETTER (OUTPATIENT)
Age: 67
End: 2022-09-03

## 2022-09-15 ENCOUNTER — TELEPHONE (OUTPATIENT)
Dept: FAMILY MEDICINE | Facility: CLINIC | Age: 67
End: 2022-09-15

## 2022-09-15 ENCOUNTER — TRANSFERRED RECORDS (OUTPATIENT)
Dept: HEALTH INFORMATION MANAGEMENT | Facility: CLINIC | Age: 67
End: 2022-09-15

## 2022-09-15 NOTE — TELEPHONE ENCOUNTER
Reason for Call:  Other     Detailed comments: please call the patient to fit her in the a physical before 7/6/2023 she would like to get in before the end of the year. She would also like to know if she is not able to get in she she come have labs done sometime this year for refills     Phone Number Patient can be reached at: Home number on file [phone] (home)    Best Time: any    Can we leave a detailed message on this number? YES    Call taken on 9/15/2022 at 12:47 PM by Angie Walker

## 2022-09-26 ENCOUNTER — TRANSFERRED RECORDS (OUTPATIENT)
Dept: HEALTH INFORMATION MANAGEMENT | Facility: CLINIC | Age: 67
End: 2022-09-26

## 2022-10-30 DIAGNOSIS — E78.5 HYPERLIPIDEMIA WITH TARGET LDL LESS THAN 130: ICD-10-CM

## 2022-10-31 RX ORDER — ATORVASTATIN CALCIUM 20 MG/1
TABLET, FILM COATED ORAL
Qty: 90 TABLET | Refills: 2 | Status: SHIPPED | OUTPATIENT
Start: 2022-10-31 | End: 2022-11-21

## 2022-11-21 ENCOUNTER — TELEPHONE (OUTPATIENT)
Dept: NURSING | Facility: CLINIC | Age: 67
End: 2022-11-21

## 2022-11-21 DIAGNOSIS — E78.5 HYPERLIPIDEMIA WITH TARGET LDL LESS THAN 130: ICD-10-CM

## 2022-11-21 DIAGNOSIS — E03.9 ACQUIRED HYPOTHYROIDISM: ICD-10-CM

## 2022-11-21 DIAGNOSIS — E55.9 VITAMIN D DEFICIENCY DISEASE: ICD-10-CM

## 2022-11-21 DIAGNOSIS — E66.01 MORBID OBESITY (H): Primary | ICD-10-CM

## 2022-11-21 DIAGNOSIS — K22.4 ESOPHAGEAL DYSMOTILITY: ICD-10-CM

## 2022-11-21 RX ORDER — LEVOTHYROXINE SODIUM 88 UG/1
88 TABLET ORAL DAILY
Qty: 90 TABLET | Refills: 3 | Status: SHIPPED | OUTPATIENT
Start: 2022-11-21 | End: 2023-07-27

## 2022-11-21 RX ORDER — ATORVASTATIN CALCIUM 20 MG/1
20 TABLET, FILM COATED ORAL DAILY
Qty: 90 TABLET | Refills: 3 | Status: SHIPPED | OUTPATIENT
Start: 2022-11-21 | End: 2023-12-04

## 2022-11-21 NOTE — TELEPHONE ENCOUNTER
Pt calling for the followin) annual physical visit today cancelled as PCP is out sick. Pt tried to request for an appointment but soonest is not until 2023. Pt asks if she can schedule in December.     2) Pt asks if she can come in for labs first prior to the appointment.    3) Pt asks about med refills, FNA advised that she has refills for atorvastatin and levothyroxine good for 1 year.    Kindly call pt within 24 hours to help her schedule appointment, [phone]    Venus Pat RN/[place] Nurse Advisor

## 2022-11-23 ENCOUNTER — OFFICE VISIT (OUTPATIENT)
Dept: FAMILY MEDICINE | Facility: CLINIC | Age: 67
End: 2022-11-23
Payer: COMMERCIAL

## 2022-11-23 VITALS
TEMPERATURE: 98.7 F | OXYGEN SATURATION: 97 % | HEIGHT: 66 IN | HEART RATE: 63 BPM | RESPIRATION RATE: 20 BRPM | SYSTOLIC BLOOD PRESSURE: 130 MMHG | WEIGHT: 238 LBS | BODY MASS INDEX: 38.25 KG/M2 | DIASTOLIC BLOOD PRESSURE: 86 MMHG

## 2022-11-23 DIAGNOSIS — Z00.00 ENCOUNTER FOR MEDICARE ANNUAL WELLNESS EXAM: Primary | ICD-10-CM

## 2022-11-23 DIAGNOSIS — E66.01 MORBID OBESITY (H): ICD-10-CM

## 2022-11-23 DIAGNOSIS — E55.9 VITAMIN D DEFICIENCY DISEASE: ICD-10-CM

## 2022-11-23 DIAGNOSIS — E03.9 ACQUIRED HYPOTHYROIDISM: ICD-10-CM

## 2022-11-23 DIAGNOSIS — E78.5 HYPERLIPIDEMIA WITH TARGET LDL LESS THAN 130: ICD-10-CM

## 2022-11-23 DIAGNOSIS — L98.9 SKIN LESION: ICD-10-CM

## 2022-11-23 DIAGNOSIS — K22.4 ESOPHAGEAL DYSMOTILITY: ICD-10-CM

## 2022-11-23 DIAGNOSIS — G47.33 OSA (OBSTRUCTIVE SLEEP APNEA): ICD-10-CM

## 2022-11-23 DIAGNOSIS — M81.0 SENILE OSTEOPOROSIS: ICD-10-CM

## 2022-11-23 LAB
ALBUMIN SERPL BCG-MCNC: 4.3 G/DL (ref 3.5–5.2)
ALP SERPL-CCNC: 90 U/L (ref 35–104)
ALT SERPL W P-5'-P-CCNC: 24 U/L (ref 10–35)
ANION GAP SERPL CALCULATED.3IONS-SCNC: 12 MMOL/L (ref 7–15)
AST SERPL W P-5'-P-CCNC: 23 U/L (ref 10–35)
BILIRUB SERPL-MCNC: 0.3 MG/DL
BUN SERPL-MCNC: 7.9 MG/DL (ref 8–23)
CALCIUM SERPL-MCNC: 8.6 MG/DL (ref 8.8–10.2)
CHLORIDE SERPL-SCNC: 103 MMOL/L (ref 98–107)
CHOLEST SERPL-MCNC: 189 MG/DL
CREAT SERPL-MCNC: 0.8 MG/DL (ref 0.51–0.95)
DEPRECATED HCO3 PLAS-SCNC: 24 MMOL/L (ref 22–29)
ERYTHROCYTE [DISTWIDTH] IN BLOOD BY AUTOMATED COUNT: 13.8 % (ref 10–15)
GFR SERPL CREATININE-BSD FRML MDRD: 80 ML/MIN/1.73M2
GLUCOSE SERPL-MCNC: 94 MG/DL (ref 70–99)
HCT VFR BLD AUTO: 42.3 % (ref 35–47)
HDLC SERPL-MCNC: 36 MG/DL
HGB BLD-MCNC: 13.4 G/DL (ref 11.7–15.7)
LDLC SERPL CALC-MCNC: 118 MG/DL
MCH RBC QN AUTO: 27.7 PG (ref 26.5–33)
MCHC RBC AUTO-ENTMCNC: 31.7 G/DL (ref 31.5–36.5)
MCV RBC AUTO: 87 FL (ref 78–100)
NONHDLC SERPL-MCNC: 153 MG/DL
PLATELET # BLD AUTO: 232 10E3/UL (ref 150–450)
POTASSIUM SERPL-SCNC: 3.8 MMOL/L (ref 3.4–5.3)
PROT SERPL-MCNC: 7.1 G/DL (ref 6.4–8.3)
RBC # BLD AUTO: 4.84 10E6/UL (ref 3.8–5.2)
SODIUM SERPL-SCNC: 139 MMOL/L (ref 136–145)
T4 FREE SERPL-MCNC: 1.04 NG/DL (ref 0.9–1.7)
TRIGL SERPL-MCNC: 174 MG/DL
TSH SERPL DL<=0.005 MIU/L-ACNC: 4.28 UIU/ML (ref 0.3–4.2)
WBC # BLD AUTO: 5.4 10E3/UL (ref 4–11)

## 2022-11-23 PROCEDURE — G0439 PPPS, SUBSEQ VISIT: HCPCS | Performed by: INTERNAL MEDICINE

## 2022-11-23 PROCEDURE — 82306 VITAMIN D 25 HYDROXY: CPT | Performed by: INTERNAL MEDICINE

## 2022-11-23 PROCEDURE — 85027 COMPLETE CBC AUTOMATED: CPT | Performed by: INTERNAL MEDICINE

## 2022-11-23 PROCEDURE — 84443 ASSAY THYROID STIM HORMONE: CPT | Performed by: INTERNAL MEDICINE

## 2022-11-23 PROCEDURE — 84439 ASSAY OF FREE THYROXINE: CPT | Performed by: INTERNAL MEDICINE

## 2022-11-23 PROCEDURE — 36415 COLL VENOUS BLD VENIPUNCTURE: CPT | Performed by: INTERNAL MEDICINE

## 2022-11-23 PROCEDURE — 80061 LIPID PANEL: CPT | Performed by: INTERNAL MEDICINE

## 2022-11-23 PROCEDURE — 80053 COMPREHEN METABOLIC PANEL: CPT | Performed by: INTERNAL MEDICINE

## 2022-11-23 ASSESSMENT — ENCOUNTER SYMPTOMS
NAUSEA: 0
HEMATOCHEZIA: 0
SORE THROAT: 0
CHILLS: 0
EYE PAIN: 0
JOINT SWELLING: 0
WEAKNESS: 0
MYALGIAS: 0
PALPITATIONS: 0
PARESTHESIAS: 0
HEARTBURN: 0
CONSTIPATION: 0
FREQUENCY: 0
DIARRHEA: 0
NERVOUS/ANXIOUS: 0
FEVER: 0
DYSURIA: 0
HEMATURIA: 0
DIZZINESS: 0
ARTHRALGIAS: 1
SHORTNESS OF BREATH: 0
ABDOMINAL PAIN: 0
COUGH: 0
HEADACHES: 0

## 2022-11-23 ASSESSMENT — ACTIVITIES OF DAILY LIVING (ADL): CURRENT_FUNCTION: NO ASSISTANCE NEEDED

## 2022-11-23 ASSESSMENT — PAIN SCALES - GENERAL: PAINLEVEL: NO PAIN (0)

## 2022-11-23 NOTE — PATIENT INSTRUCTIONS
Patient Education   Personalized Prevention Plan  You are due for the preventive services outlined below.  Your care team is available to assist you in scheduling these services.  If you have already completed any of these items, please share that information with your care team to update in your medical record.  Health Maintenance Due   Topic Date Due     ANNUAL REVIEW OF HM ORDERS  Never done     Basic Metabolic Panel  10/26/2022     Cholesterol Lab  10/26/2022     Osteoporosis Screening  11/10/2022     Annual Wellness Visit  10/26/2022     Thyroid Function Lab  10/26/2022       Signs of Hearing Loss      Hearing much better with one ear can be a sign of hearing loss.   Hearing loss is a problem shared by many people. In fact, it is one of the most common health problems, particularly as people age. Most people age 65 and older have some hearing loss. By age 80, almost everyone does. Hearing loss often occurs slowly over the years. So you may not realize your hearing has gotten worse.  Have your hearing checked  Call your healthcare provider if you:    Have to strain to hear normal conversation    Have to watch other people s faces very carefully to follow what they re saying    Need to ask people to repeat what they ve said    Often misunderstand what people are saying    Turn the volume of the television or radio up so high that others complain    Feel that people are mumbling when they re talking to you    Find that the effort to hear leaves you feeling tired and irritated    Notice, when using the phone, that you hear better with one ear than the other  [employer] last reviewed this educational content on 1/1/2020 2000-2021 [employer]. All rights reserved. This information is not intended as a substitute for professional medical care. Always follow your healthcare professional's instructions.

## 2022-11-23 NOTE — PROGRESS NOTES
"SUBJECTIVE:   Ambreen is a 67 year old who presents for Preventive Visit.  Patient is recovering in regards to her previous COVID infection in [place] and ER infectionPatient has been advised of split billing requirements and indicates understanding: Yes     Tympanic membrane was drained and she is doing quite well  She has gained some weight      Are you in the first 12 months of your Medicare coverage?  No    Healthy Habits:     In general, how would you rate your overall health?  Excellent    Frequency of exercise:  2-3 days/week    Duration of exercise:  30-45 minutes    Do you usually eat at least 4 servings of fruit and vegetables a day, include whole grains    & fiber and avoid regularly eating high fat or \"junk\" foods?  Yes    Taking medications regularly:  Yes    Medication side effects:  None    Ability to successfully perform activities of daily living:  No assistance needed    Home Safety:  No safety concerns identified    Hearing Impairment:  Difficulty following a conversation in a noisy restaurant or crowded room, feel that people are mumbling or not speaking clearly, difficult to understand a speaker at a public meeting or Confucianism service, need to ask people to speak up or repeat themselves and difficulty understanding soft or whispered speech    In the past 6 months, have you been bothered by leaking of urine?  No    In general, how would you rate your overall mental or emotional health?  Excellent      PHQ-2 Total Score: 0    Additional concerns today:  Yes      Have you ever done Advance Care Planning? (For example, a Health Directive, POLST, or a discussion with a medical provider or your loved ones about your wishes): No, advance care planning information given to patient to review.  Patient plans to discuss their wishes with loved ones or provider.         Fall risk  Fallen 2 or more times in the past year?: No  Any fall with injury in the past year?: No    Cognitive " Screening   1) Repeat 3 items (Leader, Season, Table)    2) Clock draw: NORMAL  3) 3 item recall: Recalls 3 objects  Results: 3 items recalled: COGNITIVE IMPAIRMENT LESS LIKELY    Mini-CogTM Copyright AVA [employer]om[employer]. Licensed by the author for use in [facility]; reprinted with permission (dory@[facility]). All rights reserved.      Do you have sleep apnea, excessive snoring or daytime drowsiness?: yes    Reviewed and updated as needed this visit by clinical staff    Allergies  Meds     [facility] Hx          Reviewed and updated as needed this visit by Provider     Meds     [facility] Hx         Social History     Tobacco Use     Smoking status: Never     Smokeless tobacco: Never   Substance Use Topics     Alcohol use: No     Alcohol/week: 0.0 standard drinks     If you drink alcohol do you typically have >3 drinks per day or >7 drinks per week? Not applicable    Alcohol Use 11/23/2022   Prescreen: >3 drinks/day or >7 drinks/week? Not Applicable   Prescreen: >3 drinks/day or >7 drinks/week? -               Current providers sharing in care for this patient include:   Patient Care Team:  Shawn Nolan MD as PCP - General (Internal Medicine)  Shawn Nolan MD as Assigned PCP    The following health maintenance items are reviewed in Epic and correct as of today:  Health Maintenance   Topic Date Due     BMP  10/26/2022     LIPID  10/26/2022     DEXA  11/10/2022     TSH W/FREE T4 REFLEX  10/26/2022     DTAP/TDAP/TD IMMUNIZATION (2 - Td or Tdap) 08/02/2023     MEDICARE ANNUAL WELLNESS VISIT  11/23/2023     ANNUAL REVIEW OF HM ORDERS  11/23/2023     FALL RISK ASSESSMENT  11/23/2023     MAMMO SCREENING  03/11/2024     COLORECTAL CANCER SCREENING  08/14/2027     ADVANCE CARE PLANNING  11/23/2027     PHQ-2 (once per calendar year)  Completed     INFLUENZA VACCINE  Completed     Pneumococcal Vaccine: 65+ Years  Completed     ZOSTER IMMUNIZATION  Completed     COVID-19 Vaccine  Completed     IPV IMMUNIZATION  Aged Out      MENINGITIS IMMUNIZATION  Aged Out     HEPATITIS C SCREENING  Discontinued     BP Readings from Last 3 Encounters:   11/23/22 (!) 142/82   10/26/21 139/85   10/14/19 117/77    Wt Readings from Last 3 Encounters:   11/23/22 108 kg (238 lb)   10/26/21 106.1 kg (234 lb)   01/06/21 98 kg (216 lb)                  Patient Active Problem List   Diagnosis     JASMYNE (obstructive sleep apnea)     Hyperlipidemia with target LDL less than 130     External bleeding hemorrhoids     Esophageal dysmotility     Vitamin D deficiency disease     Acquired hypothyroidism     Left shoulder pain     Morbid obesity (H)     Past Surgical History:   Procedure Laterality Date     BLADDER SURGERY  19    honeycomb     COLONOSCOPY  2005     GENITOURINARY SURGERY  1974     HYSTERECTOMY, PAP NO LONGER INDICATED  1980s       Social History     Tobacco Use     Smoking status: Never     Smokeless tobacco: Never   Substance Use Topics     Alcohol use: No     Alcohol/week: 0.0 standard drinks     Family History   Problem Relation Age of Onset     Hearing Loss Mother         mennier's disease     Thyroid Disease Mother      Thyroid Disease Father      Hyperlipidemia Sister      Thyroid Disease Sister      Obesity Sister      Hyperlipidemia Sister      Thyroid Disease Sister      Obesity Sister      Brain Tumor Brother         Pituitary     Thyroid nodules Brother      Valvular heart disease Brother      Mental Illness Niece      Breast Cancer Other 41         Current Outpatient Medications   Medication Sig Dispense Refill     atorvastatin (LIPITOR) 20 MG tablet Take 1 tablet (20 mg) by mouth daily 90 tablet 3     cholecalciferol 1000 UNITS TABS Take 2,000 Units by mouth daily.       co-enzyme Q-10 100 MG CAPS capsule Take  by mouth daily.       Fish Oil OIL 2-4 daily 1000mg.        levothyroxine (SYNTHROID/LEVOTHROID) 88 MCG tablet Take 1 tablet (88 mcg) by mouth daily 90 tablet 3     magnesium 250 MG tablet Take 1 tablet by mouth daily 30 tablet       "TURMERIC PO        VITAMIN D PO Take 1 tablet by mouth daily       Allergies   Allergen Reactions     Hazelnuts [Nuts] Swelling     Raw almonds and pecans     Macrodantin [Nitrofurantoin]      Pcn [Penicillins]      Hives     Prochlorperazine Other (See Comments)     Slurred speech     Seasonal Allergies Other (See Comments)     Eyes swollen     Sulfa Drugs          Breast CA Risk Assessment (FHS-7) 10/25/2021   Do you have a family history of breast, colon, or ovarian cancer? No / Unknown           Pertinent mammograms are reviewed under the imaging tab.    Review of Systems   Constitutional: Negative for chills and fever.   HENT: Positive for hearing loss. Negative for congestion, ear pain and sore throat.    Eyes: Negative for pain and visual disturbance.   Respiratory: Negative for cough and shortness of breath.    Cardiovascular: Negative for chest pain, palpitations and peripheral edema.   Gastrointestinal: Negative for abdominal pain, constipation, diarrhea, heartburn, hematochezia and nausea.   Breasts:  Negative for tenderness and discharge.   Genitourinary: Negative for dysuria, frequency, genital sores, hematuria, pelvic pain, urgency, vaginal bleeding and vaginal discharge.   Musculoskeletal: Positive for arthralgias. Negative for joint swelling and myalgias.   Skin: Negative for rash.   Neurological: Negative for dizziness, weakness, headaches and paresthesias.   Psychiatric/Behavioral: Negative for mood changes. The patient is not nervous/anxious.        10 point ROS of systems including Constitutional, Eyes, Respiratory, Cardiovascular, Gastroenterology, Genitourinary, Integumentary, Muscularskeletal, Psychiatric were all negative except for pertinent positives noted in my HPI.    OBJECTIVE:   BP (!) 142/82 (BP Location: Left arm, Patient Position: Sitting, Cuff Size: Adult Large)   Pulse 63   Temp 98.7  F (37.1  C) (Temporal)   Resp 20   Ht 1.684 m (5' 6.3\")   Wt 108 kg (238 lb)   SpO2 97%   " "BMI 38.07 kg/m   Estimated body mass index is 38.07 kg/m  as calculated from the following:    Height as of this encounter: 1.684 m (5' 6.3\").    Weight as of this encounter: 108 kg (238 lb).  Physical Exam  GENERAL APPEARANCE: healthy, alert and no distress  EYES: Eyes grossly normal to inspection, PERRL and conjunctivae and sclerae normal  HENT: ear canals and TM's normal, nose and mouth without ulcers or lesions, oropharynx clear and oral mucous membranes moist  NECK: no adenopathy, no asymmetry, masses, or scars and thyroid normal to palpation  RESP: lungs clear to auscultation - no rales, rhonchi or wheezes  BREAST: normal without masses, tenderness or nipple discharge and no palpable axillary masses or adenopathy  CV: regular rate and rhythm, normal S1 S2, no S3 or S4, no murmur, click or rub, no peripheral edema and peripheral pulses strong  ABDOMEN: soft, nontender, no hepatosplenomegaly, no masses and bowel sounds normal  MS: no musculoskeletal defects are noted and gait is age appropriate without ataxia  SKIN: Multiple skin benign lesions and freckles no suspicious lesions or rashes  NEURO: Normal strength and tone, sensory exam grossly normal, mentation intact and speech normal  PSYCH: mentation appears normal and affect normal/bright        ASSESSMENT / PLAN:   Ambreen was seen today for physical.    Diagnoses and all orders for this visit:    Encounter for Medicare annual wellness exam  Very pleasant 67 years old who is here for physical exam and will do mammogram in March  Colonoscopy done in 2017 and she will begin to lose weight with weight watchers  Acquired hypothyroidism  -     TSH with free T4 reflex  Stable in this regard  Hyperlipidemia with target LDL less than 130  -     Comprehensive metabolic panel  -     Vitamin D Deficiency  -     Lipid panel reflex to direct LDL Fasting  We will repeat blood pressure for this patient  JASMYNE (obstructive sleep apnea)  Continue CPAP  Morbid obesity (H)  -     " Vitamin D Deficiency  Weight is complicated by sleep apnea and hyperlipidemia  Senile osteoporosis  Thyroid is more regulated and we will check a DEXA scan  -     REVIEW OF HEALTH MAINTENANCE PROTOCOL ORDERS  -     DEXA HIP/PELVIS/SPINE - Future; Future    Vitamin D deficiency disease  -     DEXA HIP/PELVIS/SPINE - Future; Future  -     Vitamin D Deficiency    Skin lesion  -     Adult Dermatology Referral; Future  Patient will see dermatology  Esophageal dysmotility  -     CBC with Platelets    Stable at present          COUNSELING:  DT booster next year        She reports that she has never smoked. She has never used smokeless tobacco.      Appropriate preventive services were discussed with this patient, including applicable screening as appropriate for cardiovascular disease, diabetes, osteopenia/osteoporosis, and glaucoma.  As appropriate for age/gender, discussed screening for colorectal cancer,, breast cancer, and cervical cancer. Checklist reviewing preventive services available has been given to the patient.    Reviewed patients plan of care and provided an AVS. The Complex Care Plan (for patients with higher acuity and needing more deliberate coordination of services) for Ambreen meets the Care Plan requirement. This Care Plan has been established and reviewed with the Patient.      Shawn Nolan MD  [facility]    Identified Health Risks:

## 2022-11-25 LAB — DEPRECATED CALCIDIOL+CALCIFEROL SERPL-MC: 41 UG/L (ref 20–75)

## 2023-01-31 DIAGNOSIS — G47.33 OBSTRUCTIVE SLEEP APNEA (ADULT) (PEDIATRIC): Primary | ICD-10-CM

## 2023-03-13 ENCOUNTER — HOSPITAL ENCOUNTER (OUTPATIENT)
Dept: MAMMOGRAPHY | Facility: CLINIC | Age: 68
Discharge: HOME | End: 2023-03-13
Attending: INTERNAL MEDICINE
Payer: COMMERCIAL

## 2023-03-13 ENCOUNTER — HOSPITAL ENCOUNTER (OUTPATIENT)
Dept: BONE DENSITY | Facility: CLINIC | Age: 68
Discharge: HOME | End: 2023-03-13
Attending: INTERNAL MEDICINE
Payer: COMMERCIAL

## 2023-03-13 DIAGNOSIS — M81.0 SENILE OSTEOPOROSIS: ICD-10-CM

## 2023-03-13 DIAGNOSIS — E55.9 VITAMIN D DEFICIENCY DISEASE: ICD-10-CM

## 2023-03-13 DIAGNOSIS — Z12.31 ENCOUNTER FOR SCREENING MAMMOGRAM FOR MALIGNANT NEOPLASM OF BREAST: ICD-10-CM

## 2023-03-13 PROCEDURE — 77080 DXA BONE DENSITY AXIAL: CPT

## 2023-03-13 PROCEDURE — 77067 SCR MAMMO BI INCL CAD: CPT

## 2023-05-19 ENCOUNTER — LAB (OUTPATIENT)
Dept: LAB | Facility: CLINIC | Age: 68
End: 2023-05-19
Payer: COMMERCIAL

## 2023-05-19 DIAGNOSIS — E78.5 HYPERLIPIDEMIA WITH TARGET LDL LESS THAN 130: ICD-10-CM

## 2023-05-19 DIAGNOSIS — E55.9 VITAMIN D DEFICIENCY DISEASE: ICD-10-CM

## 2023-05-19 DIAGNOSIS — E03.9 ACQUIRED HYPOTHYROIDISM: ICD-10-CM

## 2023-05-19 LAB
ANION GAP SERPL CALCULATED.3IONS-SCNC: 12 MMOL/L (ref 7–15)
BUN SERPL-MCNC: 11 MG/DL (ref 8–23)
CALCIUM SERPL-MCNC: 9.3 MG/DL (ref 8.8–10.2)
CHLORIDE SERPL-SCNC: 105 MMOL/L (ref 98–107)
CHOLEST SERPL-MCNC: 179 MG/DL
CREAT SERPL-MCNC: 0.79 MG/DL (ref 0.51–0.95)
DEPRECATED HCO3 PLAS-SCNC: 24 MMOL/L (ref 22–29)
GFR SERPL CREATININE-BSD FRML MDRD: 82 ML/MIN/1.73M2
GLUCOSE SERPL-MCNC: 99 MG/DL (ref 70–99)
HDLC SERPL-MCNC: 36 MG/DL
LDLC SERPL CALC-MCNC: 113 MG/DL
NONHDLC SERPL-MCNC: 143 MG/DL
POTASSIUM SERPL-SCNC: 3.8 MMOL/L (ref 3.4–5.3)
SODIUM SERPL-SCNC: 141 MMOL/L (ref 136–145)
T4 FREE SERPL-MCNC: 1.01 NG/DL (ref 0.9–1.7)
TRIGL SERPL-MCNC: 148 MG/DL
TSH SERPL DL<=0.005 MIU/L-ACNC: 5.85 UIU/ML (ref 0.3–4.2)

## 2023-05-19 PROCEDURE — 80061 LIPID PANEL: CPT

## 2023-05-19 PROCEDURE — 36415 COLL VENOUS BLD VENIPUNCTURE: CPT

## 2023-05-19 PROCEDURE — 84439 ASSAY OF FREE THYROXINE: CPT

## 2023-05-19 PROCEDURE — 80048 BASIC METABOLIC PNL TOTAL CA: CPT

## 2023-05-19 PROCEDURE — 84443 ASSAY THYROID STIM HORMONE: CPT

## 2023-05-19 RX ORDER — LEVOTHYROXINE SODIUM 100 UG/1
100 TABLET ORAL DAILY
Qty: 90 TABLET | Refills: 3 | Status: SHIPPED | OUTPATIENT
Start: 2023-05-19 | End: 2023-12-04

## 2023-07-26 NOTE — PROGRESS NOTES
[facility] Dermatology Note  Encounter Date: Jul 27, 2023  Office Visit     Dermatology Problem List:  Last skin check: 7/27/23  1.Clinically     # Reviewed previous dermatology record from Dr. Delatorre    No family hx of melanoma but father with hx of unknown skin cancer  No personal hx of melanoma  ____________________________________________    Assessment & Plan:    # Reviewed previous dermatology record from Dr. Delatorre who noted there were benign nevi upon exam and no suspicious lesions to monitor at that time.     # Solar lentigines on the sun exposed skin areas. Benign nature was discussed. No further intervention required at this time.        # Seborrheic keratosis,   irritated and hx of rubbing, itching and pain, wants cryo on back X4    # Multiple clinically benign nevi on the trunk and extremities. No treatment is necessary at this time unless the lesion changes or becomes symptomatic.      #Skin lesion. Consistent with ink spot lentigo, declines biopsy Has had for years. Has not changed. Was photographed by Dr. Delatorre but we do not have access to the photo. Will plan to monitor and recheck on follow up.   -Photographs obtained.   -Monitor for changes.     ##seborrheic dermatitis, ears  -synalar three times weekly as needed, reviewed to keep out of ear and steroid atrophy    Procedures Performed:   Cryotherapy procedure note: After verbal consent and discussion of risks and benefits including but no limited to dyspigmentation/scar, blister, and pain, 4 was(were) treated with 1-2mm freeze border for 2 cycles with liquid nitrogen. Post cryotherapy instructions were provided.       Follow-up: 1 year(s) in-person, or earlier for new or changing lesions    Staff and Scribe:     Scribe Disclosure:   I, Jennifer Perkins, am serving as a scribe to document services personally performed by this physician, Dr. Zuri Carter, based on data collection and the provider's statements to me.     Provider  Disclosure:   The documentation recorded by the scribe accurately reflects the services I personally performed and the decisions made by me.    Zuri Carter MD    Department of Dermatology  [facility]: Phone: [phone], Fax:[phone]  [facility]: Phone: [phone], Fax: [phone]     ____________________________________________    CC: Skin Check (No areas of concern)    HPI:  Ms. Ambreen Mora is a(n) 67 year old female who presents today as a new patient for a skin check. Nothing bleeding, crusting, or changing. Pt reports there is nothing she would like removed or treated at this time unless there is concerning lesion noted on exam. Saw dermatology once before, and reported at that time there was a spot that was noted to monitor on the abdomen.       Referred by Dr. Nolan on 11/23/22.       Ears are itchy, has seen ent     Patient is otherwise feeling well, without additional skin concerns.    Labs Reviewed:  N/A    Physical Exam:  Vitals: There were no vitals taken for this visit.  SKIN: Total skin including external buttocks was performed. The exam included the head/face, neck, both arms, chest, back, abdomen, both legs, digits and/or nails. Nails painted   -6mm brown ink like macule-central abdomen.   - There are waxy stuck on tan to brown papules on the trunk and extremities.  - Scattered brown macules on sun exposed areas.  -scaly ears, external  - Multiple regular brown pigmented macules and papules are identified on the trunk and extremities.   - No other lesions of concern on areas examined.     Medications:  Current Outpatient Medications   Medication    atorvastatin (LIPITOR) 20 MG tablet    cholecalciferol 1000 UNITS TABS    co-enzyme Q-10 100 MG CAPS capsule    Fish Oil OIL    levothyroxine (SYNTHROID/LEVOTHROID) 100 MCG tablet    magnesium 250 MG tablet    TURMERIC  PO    VITAMIN D PO    levothyroxine (SYNTHROID/LEVOTHROID) 88 MCG tablet     No current facility-administered medications for this visit.      Past Medical History:   Patient Active Problem List   Diagnosis    JASMYNE (obstructive sleep apnea)    Hyperlipidemia with target LDL less than 130    External bleeding hemorrhoids    Esophageal dysmotility    Vitamin D deficiency disease    Acquired hypothyroidism    Left shoulder pain    Morbid obesity (H)     Past Medical History:   Diagnosis Date    Esophageal dysmotility 5/12/2013    EGD 2010 - with dilation     External bleeding hemorrhoids 5/12/2013    Hyperlipidaemia LDL goal < 130 5/6/2013    Hypothyroidism 5/6/2013    Obesity 8/2/2013    JASMYNE (obstructive sleep apnea) 5/6/2013    cpap    Vertigo 5-13    Vitamin D deficiency disease 8/2/2013        CC Shawn Nolan MD  [address] [place] [id number] on close of this encounter.

## 2023-07-27 ENCOUNTER — OFFICE VISIT (OUTPATIENT)
Dept: DERMATOLOGY | Facility: CLINIC | Age: 68
End: 2023-07-27
Attending: INTERNAL MEDICINE
Payer: COMMERCIAL

## 2023-07-27 DIAGNOSIS — L21.9 DERMATITIS, SEBORRHEIC: ICD-10-CM

## 2023-07-27 DIAGNOSIS — L82.1 SK (SEBORRHEIC KERATOSIS): ICD-10-CM

## 2023-07-27 DIAGNOSIS — D22.9 MULTIPLE BENIGN NEVI: Primary | ICD-10-CM

## 2023-07-27 DIAGNOSIS — L98.9 SKIN LESION: ICD-10-CM

## 2023-07-27 PROCEDURE — 99203 OFFICE O/P NEW LOW 30 MIN: CPT | Mod: 25 | Performed by: DERMATOLOGY

## 2023-07-27 PROCEDURE — 17110 DESTRUCTION B9 LES UP TO 14: CPT | Performed by: DERMATOLOGY

## 2023-07-27 RX ORDER — FLUOCINOLONE ACETONIDE 0.1 MG/ML
SOLUTION TOPICAL
Qty: 60 ML | Refills: 1 | Status: SHIPPED | OUTPATIENT
Start: 2023-07-27 | End: 2024-01-31

## 2023-07-27 ASSESSMENT — PAIN SCALES - GENERAL: PAINLEVEL: NO PAIN (0)

## 2023-07-27 NOTE — NURSING NOTE
Ambreen Mora's chief complaint for this visit includes:  Chief Complaint   Patient presents with    Skin Check     No areas of concern     PCP: Shawn Nolan    Referring Provider:  Shawn Nolan MD  [address] [place] [address]    There were no vitals taken for this visit.  No Pain (0)        Allergies   Allergen Reactions    Hazelnuts [Nuts] Swelling     Raw almonds and pecans    Macrodantin [Nitrofurantoin]     Pcn [Penicillins]      Hives    Prochlorperazine Other (See Comments)     Slurred speech    Seasonal Allergies Other (See Comments)     Eyes swollen    Sulfa Antibiotics          Do you need any medication refills at today's visit?    No

## 2023-07-27 NOTE — PATIENT INSTRUCTIONS
Checking for Skin Cancer  You can help find cancer early by checking your skin each month. There are 3 main kinds of skin cancer: melanoma, basal cell carcinoma, and squamous cell carcinoma. Doing monthly skin checks is the best way to find new marks, sores, or skin changes. Follow these instructions for checking your skin.   The ABCDEs of checking moles for melanoma   Check your moles or growths for signs of melanoma using ABCDE:   Asymmetry: The sides of the mole or growth don t match.  Border: The edges are ragged, notched, or blurred.  Color: The color within the mole or growth varies. It could be black, brown, tan, white, or shades of red, gray, or blue.  Diameter: The mole or growth is larger than   inch or 6 mm (size of a pencil eraser).  Evolving: The size, shape, texture, or color of the mole or growth is changing.     ABCDE's of moles on light skin.        ABCDE's of moles on dark skin may be harder to identify.     Checking for other types of skin cancer  Basal cell carcinoma or squamous cell carcinoma cause symptoms like:     A spot or mole that looks different from all other marks on your skin  Changes in how an area feels, such as itching, tenderness, or pain  Changes in the skin's surface, such as oozing, bleeding, or scaliness  A sore that doesn't heal  New swelling, redness, or spread of color beyond the border of a mole    Who s at risk?  Anyone of any skin color can get skin cancer. But you're at greater risk if you have:   Fair skin that freckles easily and burns instead of tanning  Light-colored or red hair  Light-colored eyes  Many moles or abnormal moles on your skin  A long history of unprotected exposure to sunlight or tanning beds  A history of many blistering sunburns as a child or teen  A family history of skin cancer  Been exposed to radiation or chemicals  A weakened immune system  Been exposed to arsenic  If you've had skin cancer in the past, you're at high risk of having it again.    How to check your skin  Do your monthly skin checkups in front of a full-length mirror. Use a room with good lighting so it's easier to see. Use a hand mirror to look at hard-to-see places like your buttocks and back. You can also have a trusted friend or family member help you with these checks. Check every part of your body, including your:   Head (ears, face, neck, and scalp)  Torso (front, back, sides, and under breasts)  Arms (tops, undersides, and armpits)  Hands (palms, backs, and fingers, including under the nails)  Lower back, buttocks, and genitals  Legs (front, back, and sides)  Feet (tops, soles, toes, including under the nails, and between toes)  Watch for new spots on your skin or a spot that's changing in color, shape, size.   If you have a lot of moles, take digital photos of them each month. Make sure to take photos both up close and from a distance. These can help you see if any moles change over time.   Know your skin  Most skin changes aren't cancer. But if you see any changes in your skin, call your healthcare provider right away. Only they can tell you if a change is a problem. If you have skin cancer, seeing your provider can be the first step to getting the treatment that could save your life.   [employer] last reviewed this educational content on 10/1/2021    8219-1124 [employer]. All rights reserved. This information is not intended as a substitute for professional medical care. Always follow your healthcare professional's instructions.                      Cryotherapy    What is it?  Use of a very cold liquid, such as liquid nitrogen, to freeze and destroy abnormal skin cells that need to be removed    What should I expect?  Tenderness and redness  A small blister that might grow and fill with dark purple blood. There may be crusting.  More than one treatment may be needed if the lesions do not go away.    How do I care for the treated area?  Gently wash the area with your  hands when bathing.  Use a thin layer of Vaseline to help with healing. You may use a Band-Aid.   The area should heal within 7-10 days and may leave behind a pink or lighter color.   Do not use an antibiotic or Neosporin ointment.   You may take acetaminophen (Tylenol) for pain.     Call your doctor if you have:  Severe pain  Signs of infection (warmth, redness, cloudy yellow drainage, and or a bad smell)  Questions or concerns    Who should I call with questions?      [facility]: [phone]      [facility]: [phone]      For urgent needs outside of business hours call the [facility] at [phone] and ask for the dermatology resident on call

## 2023-07-27 NOTE — LETTER
7/27/2023         RE: Ambreen Mora  [address] [place] [address]        Dear Colleague,    Thank you for referring your patient, Ambreen Mora, to the [facility]. Please see a copy of my visit note below.    [facility] Dermatology Note  Encounter Date: Jul 27, 2023  Office Visit     Dermatology Problem List:  Last skin check: 7/27/23  1.Clinically     # Reviewed previous dermatology record from Dr. Delatorre    No family hx of melanoma but father with hx of unknown skin cancer  No personal hx of melanoma  ____________________________________________    Assessment & Plan:    # Reviewed previous dermatology record from Dr. Delatorre who noted there were benign nevi upon exam and no suspicious lesions to monitor at that time.     # Solar lentigines on the sun exposed skin areas. Benign nature was discussed. No further intervention required at this time.        # Seborrheic keratosis,   irritated and hx of rubbing, itching and pain, wants cryo on back X4    # Multiple clinically benign nevi on the trunk and extremities. No treatment is necessary at this time unless the lesion changes or becomes symptomatic.      #Skin lesion. Consistent with ink spot lentigo, declines biopsy Has had for years. Has not changed. Was photographed by Dr. Delatorre but we do not have access to the photo. Will plan to monitor and recheck on follow up.   -Photographs obtained.   -Monitor for changes.     ##seborrheic dermatitis, ears  -synalar three times weekly as needed, reviewed to keep out of ear and steroid atrophy    Procedures Performed:   Cryotherapy procedure note: After verbal consent and discussion of risks and benefits including but no limited to dyspigmentation/scar, blister, and pain, 4 was(were) treated with 1-2mm freeze border for 2 cycles with liquid nitrogen. Post cryotherapy instructions were provided.       Follow-up: 1 year(s) in-person, or earlier for new or changing  lesions    Staff and Scribe:     Scribe Disclosure:   I, Jennifer Dorseysilviabraulio, am serving as a scribe to document services personally performed by this physician, Dr. Zuri Carter, based on data collection and the provider's statements to me.     Provider Disclosure:   The documentation recorded by the scribe accurately reflects the services I personally performed and the decisions made by me.    Zuri Carter MD    Department of Dermatology  [facility]: Phone: [phone], Fax:[phone]  [facility]: Phone: [phone], Fax: [phone]     ____________________________________________    CC: Skin Check (No areas of concern)    HPI:  Ms. Ambreen Mora is a(n) 67 year old female who presents today as a new patient for a skin check. Nothing bleeding, crusting, or changing. Pt reports there is nothing she would like removed or treated at this time unless there is concerning lesion noted on exam. Saw dermatology once before, and reported at that time there was a spot that was noted to monitor on the abdomen.       Referred by Dr. Nolan on 11/23/22.       Ears are itchy, has seen ent     Patient is otherwise feeling well, without additional skin concerns.    Labs Reviewed:  N/A    Physical Exam:  Vitals: There were no vitals taken for this visit.  SKIN: Total skin including external buttocks was performed. The exam included the head/face, neck, both arms, chest, back, abdomen, both legs, digits and/or nails. Nails painted   -6mm brown ink like macule-central abdomen.   - There are waxy stuck on tan to brown papules on the trunk and extremities.  - Scattered brown macules on sun exposed areas.  -scaly ears, external  - Multiple regular brown pigmented macules and papules are identified on the trunk and extremities.   - No other lesions of concern on areas examined.     Medications:  Current  Outpatient Medications   Medication     atorvastatin (LIPITOR) 20 MG tablet     cholecalciferol 1000 UNITS TABS     co-enzyme Q-10 100 MG CAPS capsule     Fish Oil OIL     levothyroxine (SYNTHROID/LEVOTHROID) 100 MCG tablet     magnesium 250 MG tablet     TURMERIC PO     VITAMIN D PO     levothyroxine (SYNTHROID/LEVOTHROID) 88 MCG tablet     No current facility-administered medications for this visit.      Past Medical History:   Patient Active Problem List   Diagnosis     JASMYNE (obstructive sleep apnea)     Hyperlipidemia with target LDL less than 130     External bleeding hemorrhoids     Esophageal dysmotility     Vitamin D deficiency disease     Acquired hypothyroidism     Left shoulder pain     Morbid obesity (H)     Past Medical History:   Diagnosis Date     Esophageal dysmotility 5/12/2013    EGD 2010 - with dilation      External bleeding hemorrhoids 5/12/2013     Hyperlipidaemia LDL goal < 130 5/6/2013     Hypothyroidism 5/6/2013     Obesity 8/2/2013     JASMYNE (obstructive sleep apnea) 5/6/2013    cpap     Vertigo 5-13     Vitamin D deficiency disease 8/2/2013        CC Shawn Nolan MD  [address] [facility] [address] [address] on close of this encounter.       Again, thank you for allowing me to participate in the care of your patient.        Sincerely,        Zuri Carter MD

## 2023-11-17 ENCOUNTER — LAB (OUTPATIENT)
Dept: LAB | Facility: CLINIC | Age: 68
End: 2023-11-17
Payer: COMMERCIAL

## 2023-11-17 DIAGNOSIS — K22.4 ESOPHAGEAL DYSMOTILITY: ICD-10-CM

## 2023-11-17 DIAGNOSIS — E55.9 VITAMIN D DEFICIENCY DISEASE: ICD-10-CM

## 2023-11-17 DIAGNOSIS — E78.5 HYPERLIPIDEMIA WITH TARGET LDL LESS THAN 130: ICD-10-CM

## 2023-11-17 DIAGNOSIS — E03.9 ACQUIRED HYPOTHYROIDISM: Primary | ICD-10-CM

## 2023-11-17 DIAGNOSIS — E03.9 ACQUIRED HYPOTHYROIDISM: ICD-10-CM

## 2023-11-17 LAB
ALBUMIN SERPL BCG-MCNC: 4.2 G/DL (ref 3.5–5.2)
ALP SERPL-CCNC: 103 U/L (ref 40–150)
ALT SERPL W P-5'-P-CCNC: 14 U/L (ref 0–50)
ANION GAP SERPL CALCULATED.3IONS-SCNC: 9 MMOL/L (ref 7–15)
AST SERPL W P-5'-P-CCNC: 16 U/L (ref 0–45)
BILIRUB SERPL-MCNC: 0.3 MG/DL
BUN SERPL-MCNC: 9.1 MG/DL (ref 8–23)
CALCIUM SERPL-MCNC: 9.2 MG/DL (ref 8.8–10.2)
CHLORIDE SERPL-SCNC: 104 MMOL/L (ref 98–107)
CHOLEST SERPL-MCNC: 172 MG/DL
CREAT SERPL-MCNC: 0.78 MG/DL (ref 0.51–0.95)
CRP SERPL-MCNC: <3 MG/L
DEPRECATED HCO3 PLAS-SCNC: 27 MMOL/L (ref 22–29)
EGFRCR SERPLBLD CKD-EPI 2021: 82 ML/MIN/1.73M2
ERYTHROCYTE [DISTWIDTH] IN BLOOD BY AUTOMATED COUNT: 13.2 % (ref 10–15)
GLUCOSE SERPL-MCNC: 90 MG/DL (ref 70–99)
HCT VFR BLD AUTO: 43.1 % (ref 35–47)
HDLC SERPL-MCNC: 33 MG/DL
HGB BLD-MCNC: 13.4 G/DL (ref 11.7–15.7)
LDLC SERPL CALC-MCNC: 104 MG/DL
MCH RBC QN AUTO: 27.3 PG (ref 26.5–33)
MCHC RBC AUTO-ENTMCNC: 31.1 G/DL (ref 31.5–36.5)
MCV RBC AUTO: 88 FL (ref 78–100)
NONHDLC SERPL-MCNC: 139 MG/DL
PLATELET # BLD AUTO: 248 10E3/UL (ref 150–450)
POTASSIUM SERPL-SCNC: 3.9 MMOL/L (ref 3.4–5.3)
PROT SERPL-MCNC: 7.3 G/DL (ref 6.4–8.3)
RBC # BLD AUTO: 4.91 10E6/UL (ref 3.8–5.2)
SODIUM SERPL-SCNC: 140 MMOL/L (ref 135–145)
TRIGL SERPL-MCNC: 174 MG/DL
TSH SERPL DL<=0.005 MIU/L-ACNC: 2.86 UIU/ML (ref 0.3–4.2)
VIT B12 SERPL-MCNC: 553 PG/ML (ref 232–1245)
VIT D+METAB SERPL-MCNC: 34 NG/ML (ref 20–50)
WBC # BLD AUTO: 7.2 10E3/UL (ref 4–11)

## 2023-11-17 PROCEDURE — 36415 COLL VENOUS BLD VENIPUNCTURE: CPT

## 2023-11-17 PROCEDURE — 82306 VITAMIN D 25 HYDROXY: CPT

## 2023-11-17 PROCEDURE — 82607 VITAMIN B-12: CPT

## 2023-11-17 PROCEDURE — 85027 COMPLETE CBC AUTOMATED: CPT

## 2023-11-17 PROCEDURE — 84443 ASSAY THYROID STIM HORMONE: CPT

## 2023-11-17 PROCEDURE — 80053 COMPREHEN METABOLIC PANEL: CPT

## 2023-11-17 PROCEDURE — 80061 LIPID PANEL: CPT

## 2023-11-17 PROCEDURE — 86140 C-REACTIVE PROTEIN: CPT

## 2023-12-05 ENCOUNTER — OFFICE VISIT (OUTPATIENT)
Dept: FAMILY MEDICINE | Facility: CLINIC | Age: 68
End: 2023-12-05
Payer: COMMERCIAL

## 2023-12-05 VITALS
RESPIRATION RATE: 18 BRPM | OXYGEN SATURATION: 95 % | SYSTOLIC BLOOD PRESSURE: 127 MMHG | WEIGHT: 240 LBS | HEIGHT: 66 IN | BODY MASS INDEX: 38.57 KG/M2 | HEART RATE: 67 BPM | DIASTOLIC BLOOD PRESSURE: 82 MMHG

## 2023-12-05 DIAGNOSIS — E66.01 MORBID OBESITY (H): ICD-10-CM

## 2023-12-05 DIAGNOSIS — K22.4 ESOPHAGEAL DYSMOTILITY: ICD-10-CM

## 2023-12-05 DIAGNOSIS — M89.9 DISEASE OF BONE: ICD-10-CM

## 2023-12-05 DIAGNOSIS — E78.5 HYPERLIPIDEMIA LDL GOAL <100: ICD-10-CM

## 2023-12-05 DIAGNOSIS — E03.9 ACQUIRED HYPOTHYROIDISM: ICD-10-CM

## 2023-12-05 DIAGNOSIS — G47.33 OSA (OBSTRUCTIVE SLEEP APNEA): ICD-10-CM

## 2023-12-05 DIAGNOSIS — Z00.00 ENCOUNTER FOR ANNUAL WELLNESS VISIT (AWV) IN MEDICARE PATIENT: Primary | ICD-10-CM

## 2023-12-05 PROCEDURE — G0439 PPPS, SUBSEQ VISIT: HCPCS | Performed by: INTERNAL MEDICINE

## 2023-12-05 RX ORDER — LEVOTHYROXINE SODIUM 100 UG/1
100 TABLET ORAL DAILY
Qty: 90 TABLET | Refills: 3 | Status: SHIPPED | OUTPATIENT
Start: 2023-12-05

## 2023-12-05 RX ORDER — ATORVASTATIN CALCIUM 20 MG/1
20 TABLET, FILM COATED ORAL DAILY
Qty: 90 TABLET | Refills: 3 | Status: SHIPPED | OUTPATIENT
Start: 2023-12-05

## 2023-12-05 ASSESSMENT — ENCOUNTER SYMPTOMS
SORE THROAT: 0
MYALGIAS: 0
COUGH: 0
JOINT SWELLING: 0
EYE PAIN: 0
PARESTHESIAS: 0
WEAKNESS: 0
HEMATURIA: 0
DIZZINESS: 0
HEARTBURN: 1
HEMATOCHEZIA: 0
DYSURIA: 0
SHORTNESS OF BREATH: 0
HEADACHES: 0
ARTHRALGIAS: 0
NAUSEA: 1
ABDOMINAL PAIN: 1
CONSTIPATION: 0
FREQUENCY: 0
NERVOUS/ANXIOUS: 0
FEVER: 0
PALPITATIONS: 0
BREAST MASS: 0
CHILLS: 0

## 2023-12-05 ASSESSMENT — PAIN SCALES - GENERAL: PAINLEVEL: NO PAIN (0)

## 2023-12-05 ASSESSMENT — ACTIVITIES OF DAILY LIVING (ADL): CURRENT_FUNCTION: NO ASSISTANCE NEEDED

## 2023-12-05 NOTE — PROGRESS NOTES
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  No  Part B 08/01/2020    Healthy Habits:     In general, how would you rate your overall health?  Good    Frequency of exercise:  2-3 days/week    Duration of exercise:  30-45 minutes    Do you usually eat at least 4 servings of fruit and vegetables a day, include whole grains    & fiber and avoid regularly eating high fat or \"junk\" foods?  Yes    Taking medications regularly:  Yes    Medication side effects:  Not applicable    Ability to successfully perform activities of daily living:  No assistance needed    Home Safety:  Throw rugs in the hallway and lack of grab bars in the bathroom    Hearing Impairment:  Difficulty following a conversation in a noisy restaurant or crowded room, feel that people are mumbling or not speaking clearly, difficult to understand a speaker at a public meeting or [employer] service, need to ask people to speak up or repeat themselves and difficulty understanding soft or whispered speech    In the past 6 months, have you been bothered by leaking of urine?  No    In general, how would you rate your overall mental or emotional health?  Excellent    Additional concerns today:  Yes      Today's PHQ-2 Score:       12/5/2023     8:50 AM   PHQ-2 ( 1999 [employer])   Q1: Little interest or pleasure in doing things 0   Q2: Feeling down, depressed or hopeless 0   PHQ-2 Score 0   Q1: Little interest or pleasure in doing things Not at all   Q2: Feeling down, depressed or hopeless Not at all   PHQ-2 Score 0     Have you ever done Advance Care Planning? (For example, a Health Directive, POLST, or a discussion with a medical provider or your loved ones " about your wishes): No, advance care planning information given to patient to review.  Patient plans to discuss their wishes with loved ones or provider.         Fall risk  Fallen 2 or more times in the past year?: No  Any fall with injury in the past year?: No    Cognitive Screening   1) Repeat 3 items (Leader, Season, Table)    2) Clock draw: NORMAL  3) 3 item recall: Recalls 3 objects  Results: 3 items recalled: COGNITIVE IMPAIRMENT LESS LIKELY    Mini-CogTM Copyright AVA [employer]. Licensed by the author for use in [facility]; reprinted with permission (dory@[facility]). All rights reserved.        Reviewed and updated as needed this visit by clinical staff   Tobacco  Allergies  Meds  Problems  Med Hx   Fam Hx          Reviewed and updated as needed this visit by Provider    Allergies  Meds  Problems  Med Hx   Fam Hx         Social History     Tobacco Use     Smoking status: Never     Smokeless tobacco: Never   Substance Use Topics     Alcohol use: No     Alcohol/week: 0.0 standard drinks of alcohol             12/5/2023     8:49 AM   Alcohol Use   Prescreen: >3 drinks/day or >7 drinks/week? No          No data to display              Do you have a current opioid prescription? No  Do you use any other controlled substances or medications that are not prescribed by a provider? None              Current providers sharing in care for this patient include:   Patient Care Team:  Shawn Noaln MD as PCP - General (Internal Medicine)  Shawn Nolan MD as Assigned PCP  Zuri Carter MD as MD (Dermatology)  Zuri Carter MD as Assigned Surgical Provider    The following health maintenance items are reviewed in Epic and correct as of today:  Health Maintenance   Topic Date Due     RSV VACCINE (Pregnancy & 60+) (1 - 1-dose 60+ series) Never done     DTAP/TDAP/TD IMMUNIZATION (2 - Td or Tdap) 08/02/2023     BMP  11/17/2024     LIPID  11/17/2024     TSH W/FREE T4 REFLEX  11/17/2024     MEDICARE ANNUAL  WELLNESS VISIT  12/05/2024     ANNUAL REVIEW OF HM ORDERS  12/05/2024     FALL RISK ASSESSMENT  12/05/2024     DEXA  03/13/2025     MAMMO SCREENING  03/13/2025     COLORECTAL CANCER SCREENING  08/14/2027     ADVANCE CARE PLANNING  12/05/2028     PHQ-2 (once per calendar year)  Completed     INFLUENZA VACCINE  Completed     Pneumococcal Vaccine: 65+ Years  Completed     ZOSTER IMMUNIZATION  Completed     COVID-19 Vaccine  Completed     IPV IMMUNIZATION  Aged Out     HPV IMMUNIZATION  Aged Out     MENINGITIS IMMUNIZATION  Aged Out     RSV MONOCLONAL ANTIBODY  Aged Out     HEPATITIS C SCREENING  Discontinued     BP Readings from Last 3 Encounters:   12/05/23 127/82   11/23/22 130/86   10/26/21 139/85    Wt Readings from Last 3 Encounters:   12/05/23 108.9 kg (240 lb)   11/23/22 108 kg (238 lb)   10/26/21 106.1 kg (234 lb)                  Patient Active Problem List   Diagnosis     JASMYNE (obstructive sleep apnea)     Hyperlipidemia with target LDL less than 130     External bleeding hemorrhoids     Esophageal dysmotility     Vitamin D deficiency disease     Acquired hypothyroidism     Left shoulder pain     Morbid obesity (H)     Past Surgical History:   Procedure Laterality Date     BLADDER SURGERY  19    honeycomb     COLONOSCOPY  2005     GENITOURINARY SURGERY  1974     HYSTERECTOMY, PAP NO LONGER INDICATED  1980s       Social History     Tobacco Use     Smoking status: Never     Smokeless tobacco: Never   Substance Use Topics     Alcohol use: No     Alcohol/week: 0.0 standard drinks of alcohol     Family History   Problem Relation Age of Onset     Hearing Loss Mother         mennier's disease     Thyroid Disease Mother      Thyroid Disease Father      Hyperlipidemia Sister      Thyroid Disease Sister      Obesity Sister      Hyperlipidemia Sister      Thyroid Disease Sister      Obesity Sister      Brain Tumor Brother         Pituitary     Thyroid nodules Brother      Valvular heart disease Brother      Mental  Illness Niece      Breast Cancer Other 41         Current Outpatient Medications   Medication Sig Dispense Refill     atorvastatin (LIPITOR) 20 MG tablet Take 1 tablet (20 mg) by mouth daily 90 tablet 3     levothyroxine (SYNTHROID/LEVOTHROID) 100 MCG tablet Take 1 tablet (100 mcg) by mouth daily 90 tablet 3     omeprazole (PRILOSEC) 20 MG DR capsule Take 1 capsule (20 mg) by mouth daily 90 capsule 3     cholecalciferol 1000 UNITS TABS Take 2,000 Units by mouth daily.       co-enzyme Q-10 100 MG CAPS capsule Take  by mouth daily.       Fish Oil OIL 2-4 daily 1000mg.        fluocinolone (SYNALAR) 0.01 % solution Use 3 times weekly for outside skin of ears for itching 60 mL 1     magnesium 250 MG tablet Take 1 tablet by mouth daily 30 tablet      TURMERIC PO        VITAMIN D PO Take 1 tablet by mouth daily       Allergies   Allergen Reactions     Hazelnuts [Nuts] Swelling     Raw almonds and pecans     Macrodantin [Nitrofurantoin]      Pcn [Penicillins]      Hives     Prochlorperazine Other (See Comments)     Slurred speech     Seasonal Allergies Other (See Comments)     Eyes swollen     Sulfa Antibiotics              10/25/2021     8:54 AM   Breast CA Risk Assessment (FHS-7)   Do you have a family history of breast, colon, or ovarian cancer? No / Unknown         Mammogram Screening: Recommended annual mammography  Pertinent mammograms are reviewed under the imaging tab.    Review of Systems   Constitutional:  Negative for chills and fever.   HENT:  Positive for hearing loss. Negative for congestion, ear pain and sore throat.    Eyes:  Negative for pain and visual disturbance.   Respiratory:  Negative for cough and shortness of breath.    Cardiovascular:  Negative for chest pain, palpitations and peripheral edema.   Gastrointestinal:  Positive for abdominal pain, heartburn and nausea. Negative for constipation and hematochezia.   Breasts:  Negative for tenderness, breast mass and discharge.   Genitourinary:  Negative  "for dysuria, frequency, genital sores, hematuria, pelvic pain, urgency, vaginal bleeding and vaginal discharge.   Musculoskeletal:  Negative for arthralgias, joint swelling and myalgias.   Skin:  Negative for rash.   Neurological:  Negative for dizziness, weakness, headaches and paresthesias.   Psychiatric/Behavioral:  Negative for mood changes. The patient is not nervous/anxious.          OBJECTIVE:   /82 (BP Location: Right arm, Patient Position: Sitting, Cuff Size: Adult Regular)   Pulse 67   Resp 18   Ht 1.688 m (5' 6.46\")   Wt 108.9 kg (240 lb)   SpO2 95%   BMI 38.21 kg/m   Estimated body mass index is 38.21 kg/m  as calculated from the following:    Height as of this encounter: 1.688 m (5' 6.46\").    Weight as of this encounter: 108.9 kg (240 lb).  Physical Exam  GENERAL APPEARANCE: healthy, alert and no distress  EYES: Eyes grossly normal to inspection, PERRL and conjunctivae and sclerae normal  HENT: ear canals and TM's normal, nose and mouth without ulcers or lesions, oropharynx clear and oral mucous membranes moist  NECK: no adenopathy, no asymmetry, masses, or scars and thyroid normal to palpation  RESP: lungs clear to auscultation - no rales, rhonchi or wheezes  BREAST: normal without masses, tenderness or nipple discharge and no palpable axillary masses or adenopathy  CV: regular rate and rhythm, normal S1 S2, no S3 or S4, no murmur, click or rub, no peripheral edema and peripheral pulses strong  ABDOMEN: soft, nontender, no hepatosplenomegaly, no masses and bowel sounds normal  MS: no musculoskeletal defects are noted and gait is age appropriate without ataxia  SKIN: Benign freckles no suspicious lesions or rashes  NEURO: Normal strength and tone, sensory exam grossly normal, mentation intact and speech normal  PSYCH: mentation appears normal and affect normal/bright        ASSESSMENT / PLAN:   Ambreen was seen today for physical.    Diagnoses and all orders for this visit:    Encounter for " annual wellness visit (AWV) in Medicare patient  This very nice 68 years old woman is taking good care of herself and she will join weight watchers  We could consider semaglutide or liraglutide but it is not covered by her insurance  She is up-to-date on colon exam and mammogram  Acquired hypothyroidism  -     levothyroxine (SYNTHROID/LEVOTHROID) 100 MCG tablet; Take 1 tablet (100 mcg) by mouth daily  -     **TSH with free T4 reflex FUTURE 6mo; Future    Esophageal dysmotility  -     omeprazole (PRILOSEC) 20 MG DR capsule; Take 1 capsule (20 mg) by mouth daily  We will continue omeprazole for 4 to 6 weeks and she will stop her supplements  She will report if symptoms persist an upper quadrant ultrasound has been done  JASMYNE (obstructive sleep apnea)  Doing better and working at weight loss  Morbid obesity (H)  As above  Disease of bone  Bone density was improved probably from change in the thyroid dose and also could be from arthritis  Hyperlipidemia LDL goal <100  Patient is on Lipitor and we discussed about CT coronary calcium score which we will not do at present  Other orders  -     atorvastatin (LIPITOR) 20 MG tablet; Take 1 tablet (20 mg) by mouth daily  -     Lipid panel reflex to direct LDL Fasting; Future  -     REVIEW OF HEALTH MAINTENANCE PROTOCOL ORDERS              COUNSELING:  Tdap and RSV        She reports that she has never smoked. She has never used smokeless tobacco.      Appropriate preventive services were discussed with this patient, including applicable screening as appropriate for fall prevention, nutrition, physical activity, Tobacco-use cessation, weight loss and cognition.  Checklist reviewing preventive services available has been given to the patient.    Reviewed patients plan of care and provided an AVS. The Complex Care Plan (for patients with higher acuity and needing more deliberate coordination of services) for Ambreen meets the Care Plan requirement. This Care Plan has been established  and reviewed with the Patient.        Shawn Nolan MD  [facility]    Identified Health Risks:

## 2024-01-31 ENCOUNTER — NURSE TRIAGE (OUTPATIENT)
Dept: FAMILY MEDICINE | Facility: CLINIC | Age: 69
End: 2024-01-31

## 2024-01-31 ENCOUNTER — VIRTUAL VISIT (OUTPATIENT)
Dept: FAMILY MEDICINE | Facility: CLINIC | Age: 69
End: 2024-01-31
Payer: COMMERCIAL

## 2024-01-31 DIAGNOSIS — U07.1 INFECTION DUE TO 2019 NOVEL CORONAVIRUS: Primary | ICD-10-CM

## 2024-01-31 DIAGNOSIS — E78.5 HYPERLIPIDEMIA WITH TARGET LDL LESS THAN 130: ICD-10-CM

## 2024-01-31 PROCEDURE — 99213 OFFICE O/P EST LOW 20 MIN: CPT | Mod: 95 | Performed by: INTERNAL MEDICINE

## 2024-01-31 NOTE — PROGRESS NOTES
Ambreen is a 68 year old who is being evaluated via a billable video visit.      How would you like to obtain your AVS? MyChart  If the video visit is dropped, the invitation should be resent by: Text to cell phone: [phone]  Will anyone else be joining your video visit? No          Assessment & Plan     Infection due to 2019 novel coronavirus  At risk for disease progression due to age > 65 as such candidate for antiviral therapy   Discussed possible side effects and risks of Paxlovid  Discussed drug drug interaction with atorvastatin (Lipitor) and recommended 8 day hold on .atrov   Discussed self isolation and masking guidelines  Discussed indications for seeking [facility] care   - nirmatrelvir and ritonavir (PAXLOVID) 300 mg/100 mg therapy pack; Take 3 tablets by mouth 2 times daily for 5 days    Hyperlipidemia with target LDL less than 130  See above re drug drug interaction with atorvastatin (Lipitor)       No LOS data to display   Time spent by me doing chart review, history and exam, documentation and further activities per the note            FUTURE APPOINTMENTS:       - Follow-up with whom? PCP Follow-Up for what? Acute Issue Recheck Additional Details: follow up if symptom no better in additional 7 days or sooner for new symptoms or worsening symptoms How? In Person Is this an as-needed follow-up? Yes Future, Expected: 2/7/2024 Approximate, Patient instructed to return to clinic or contact us sooner if symptoms worsen or new symptoms develop.     Subjective   Ambreen is a 68 year old, presenting for the following health issues:  Covid Concern    History of Present Illness       Reason for visit:  Positive covid home test.  Start paxlovid?  Symptom onset:  1-3 days ago  Symptoms include:  Runny nose, headache and body aches, sneezing, sore glands in neck  Symptom intensity:  Moderate  Symptom progression:  Worsening  Had these symptoms before:  Yes  Has tried/received treatment for these symptoms:  No  What makes  it better:  Cold /flu medication    She eats 4 or more servings of fruits and vegetables daily.She consumes 1 sweetened beverage(s) daily.She exercises with enough effort to increase her heart rate 30 to 60 minutes per day.  She exercises with enough effort to increase her heart rate 5 days per week.   She is taking medications regularly.                   Objective           Vitals:  No vitals were obtained today due to virtual visit.    Physical Exam   GENERAL: alert and no distress  EYES: Eyes grossly normal to inspection.  No discharge or erythema, or obvious scleral/conjunctival abnormalities.  RESP: No audible wheeze, cough, or visible cyanosis.    SKIN: Visible skin clear. No significant rash, abnormal pigmentation or lesions.  NEURO: Cranial nerves grossly intact.  Mentation and speech appropriate for age.  PSYCH: Appropriate affect, tone, and pace of words          Video-Visit Details    Type of service:  Video Visit   Video Start Time: 12:24 PM  Video End Time:12:35 PM    Originating Location (pt. Location): Home    Distant Location (provider location):  On-site  Platform used for Video Visit: Jodie  Signed Electronically by: Smooth Rodriguez MD

## 2024-01-31 NOTE — TELEPHONE ENCOUNTER
Pt tested positive for Covid with home test last night.  also has it. Triaged symptoms, see below. Daughter was influenza positive 3 weeks ago and pt was exposed to her. Protocol does not clarify timeline of exposure. Nurse offered pt a VV today and pt agreed to this. No further questions at this time. Santos also sent.    RN COVID TREATMENT VISIT  01/31/24      The patient has been triaged and does not require a higher level of care.    Ambreen Mora  68 year old  Current weight? 228 lbs     Has the patient been seen by a primary care provider at an [facility] or [facility] within the past two years? Yes.   Have you been in close proximity to/do you have a known exposure to a person with a confirmed case of influenza? Yes. Patient informed a virtual visit with a provider will be required for treatment to determine if COVID or influenza medications are best. Patient will be scheduled or transferred to a  at the end of this call.   Sofya Espino RN      Reason for Disposition   HIGH RISK patient (e.g., weak immune system, age > 64 years, obesity with BMI of 30 or higher, pregnant, chronic lung disease or other chronic medical condition) and COVID symptoms (e.g., cough, fever)  (Exceptions: Already seen by doctor or NP/PA and no new or worsening symptoms.)    Additional Information   Negative: SEVERE difficulty breathing (e.g., struggling for each breath, speaks in single words)   Negative: Difficult to awaken or acting confused (e.g., disoriented, slurred speech)   Negative: Bluish (or gray) lips or face now   Negative: Shock suspected (e.g., cold/pale/clammy skin, too weak to stand, low BP, rapid pulse)   Negative: Sounds like a life-threatening emergency to the triager   Negative: Diagnosed or suspected COVID-19 and symptoms lasting 3 or more weeks   Negative: COVID-19 exposure and no symptoms   Negative: COVID-19 vaccine reaction suspected (e.g., fever, headache,  "muscle aches) occurring 1 to 3 days after getting vaccine   Negative: COVID-19 vaccine, questions about   Negative: Lives with someone known to have influenza (flu test positive) and flu-like symptoms (e.g., cough, runny nose, sore throat, SOB; with or without fever)   Negative: Possible COVID-19 symptoms and triager concerned about severity of symptoms or other causes   Negative: COVID-19 and breastfeeding, questions about   Negative: SEVERE or constant chest pain or pressure  (Exception: Mild central chest pain, present only when coughing.)   Negative: MODERATE difficulty breathing (e.g., speaks in phrases, SOB even at rest, pulse 100-120)   Negative: Headache and stiff neck (can't touch chin to chest)   Negative: Oxygen level (e.g., pulse oximetry) 90% or lower   Negative: Chest pain or pressure  (Exception: MILD central chest pain, present only when coughing.)   Negative: Drinking very little and dehydration suspected (e.g., no urine > 12 hours, very dry mouth, very lightheaded)   Negative: Patient sounds very sick or weak to the triager    Answer Assessment - Initial Assessment Questions  1. COVID-19 DIAGNOSIS: \"How do you know that you have COVID?\" (e.g., positive lab test or self-test, diagnosed by doctor or NP/PA, symptoms after exposure).        Home test last night was positive.     2. COVID-19 EXPOSURE: \"Was there any known exposure to COVID before the symptoms began?\" CDC Definition of close contact: within 6 feet (2 meters) for a total of 15 minutes or more over a 24-hour period.         Yes,  has it as well.    3. ONSET: \"When did the COVID-19 symptoms start?\"         Started on Sunday the 28th    4. WORST SYMPTOM: \"What is your worst symptom?\" (e.g., cough, fever, shortness of breath, muscle aches)        Body ache, headache    5. COUGH: \"Do you have a cough?\" If Yes, ask: \"How bad is the cough?\"          No    6. FEVER: \"Do you have a fever?\" If Yes, ask: \"What is your temperature, how was it " "measured, and when did it start?\"        No but she does have chills last night.    7. RESPIRATORY STATUS: \"Describe your breathing?\" (e.g., normal; shortness of breath, wheezing, unable to speak)         No difficulty breathing    8. BETTER-SAME-WORSE: \"Are you getting better, staying the same or getting worse compared to yesterday?\"  If getting worse, ask, \"In what way?\"        Getting worse    9. OTHER SYMPTOMS: \"Do you have any other symptoms?\"  (e.g., chills, fatigue, headache, loss of smell or taste, muscle pain, sore throat)        Chills, headache, body aches, glands in neck are sore    10. HIGH RISK DISEASE: \"Do you have any chronic medical problems?\" (e.g., asthma, heart or lung disease, weak immune system, obesity, etc.)          Overweight    11. VACCINE: \"Have you had the COVID-19 vaccine?\" If Yes, ask: \"Which one, how many shots, when did you get it?\"          Yes and boosters as well.    12. PREGNANCY: \"Is there any chance you are pregnant?\" \"When was your last menstrual period?\"          NA    13. O2 SATURATION MONITOR:  \"Do you use an oxygen saturation monitor (pulse oximeter) at home?\" If Yes, ask \"What is your reading (oxygen level) today?\" \"What is your usual oxygen saturation reading?\" (e.g., 95%)          No    Protocols used: Coronavirus (COVID-19) Diagnosed or Vyfeygijo-W-SLMARY ROY RN  [facility]   "

## 2024-03-15 ENCOUNTER — HOSPITAL ENCOUNTER (OUTPATIENT)
Dept: MAMMOGRAPHY | Facility: CLINIC | Age: 69
Discharge: HOME | End: 2024-03-15
Attending: INTERNAL MEDICINE | Admitting: INTERNAL MEDICINE
Payer: COMMERCIAL

## 2024-03-15 ENCOUNTER — VIRTUAL VISIT (OUTPATIENT)
Dept: SLEEP MEDICINE | Facility: CLINIC | Age: 69
End: 2024-03-15
Payer: COMMERCIAL

## 2024-03-15 ENCOUNTER — TELEPHONE (OUTPATIENT)
Dept: SLEEP MEDICINE | Facility: CLINIC | Age: 69
End: 2024-03-15

## 2024-03-15 VITALS — WEIGHT: 225 LBS | BODY MASS INDEX: 35.31 KG/M2 | HEIGHT: 67 IN

## 2024-03-15 DIAGNOSIS — G47.33 OSA (OBSTRUCTIVE SLEEP APNEA): Primary | ICD-10-CM

## 2024-03-15 DIAGNOSIS — Z12.31 VISIT FOR SCREENING MAMMOGRAM: ICD-10-CM

## 2024-03-15 PROCEDURE — 99203 OFFICE O/P NEW LOW 30 MIN: CPT | Mod: 95 | Performed by: INTERNAL MEDICINE

## 2024-03-15 PROCEDURE — 77063 BREAST TOMOSYNTHESIS BI: CPT

## 2024-03-15 ASSESSMENT — SLEEP AND FATIGUE QUESTIONNAIRES
HOW LIKELY ARE YOU TO NOD OFF OR FALL ASLEEP WHEN YOU ARE A PASSENGER IN A CAR FOR AN HOUR WITHOUT A BREAK: MODERATE CHANCE OF DOZING
HOW LIKELY ARE YOU TO NOD OFF OR FALL ASLEEP WHILE SITTING INACTIVE IN A PUBLIC PLACE: WOULD NEVER DOZE
HOW LIKELY ARE YOU TO NOD OFF OR FALL ASLEEP WHILE WATCHING TV: SLIGHT CHANCE OF DOZING
HOW LIKELY ARE YOU TO NOD OFF OR FALL ASLEEP WHILE SITTING AND READING: SLIGHT CHANCE OF DOZING
HOW LIKELY ARE YOU TO NOD OFF OR FALL ASLEEP WHILE SITTING QUIETLY AFTER LUNCH WITHOUT ALCOHOL: SLIGHT CHANCE OF DOZING
HOW LIKELY ARE YOU TO NOD OFF OR FALL ASLEEP WHILE LYING DOWN TO REST IN THE AFTERNOON WHEN CIRCUMSTANCES PERMIT: SLIGHT CHANCE OF DOZING
HOW LIKELY ARE YOU TO NOD OFF OR FALL ASLEEP IN A CAR, WHILE STOPPED FOR A FEW MINUTES IN TRAFFIC: WOULD NEVER DOZE
HOW LIKELY ARE YOU TO NOD OFF OR FALL ASLEEP WHILE SITTING AND TALKING TO SOMEONE: WOULD NEVER DOZE

## 2024-03-15 ASSESSMENT — PAIN SCALES - GENERAL: PAINLEVEL: NO PAIN (0)

## 2024-03-15 NOTE — NURSING NOTE
Is the patient currently in the state of [place]? YES    Visit mode:VIDEO    If the visit is dropped, the patient can be reconnected by: VIDEO VISIT: Text to cell phone:   Telephone Information:   Mobile [phone]       Will anyone else be joining the visit? NO  (If patient encounters technical issues they should call [phone])    How would you like to obtain your AVS? MyChart    Are changes needed to the allergy or medication list? No    Reason for visit: Consult    Marga ANTHONY    Has patient had flu shot for current/most recent flu season? If so, when? Yes: 10/2023

## 2024-03-15 NOTE — TELEPHONE ENCOUNTER
General Call    Contacts         Type Contact Phone/Fax    03/15/2024 10:33 AM CDT Phone (Incoming) Ambreen Mora (Self) [phone] (M)     Needing new order for CPAP supplies - needing everything that is qualified          Reason for Call:  Cpap Supplies    What are your questions or concerns:  Needing new order to get more supplies - there has been no concern with the machine since receiving it in 2021    Date of last appointment with provider: 1/7/2021    Could we send this information to you in [employer] or would you prefer to receive a phone call?:   Patient would prefer a phone call   Okay to leave a detailed message?: Yes at Cell number on file:    Telephone Information:   Mobile [phone]

## 2024-03-15 NOTE — TELEPHONE ENCOUNTER
Will be addressed at appointment today.    Genie ANGELA RN  [facility]

## 2024-05-13 NOTE — NURSING NOTE
DME orders have been automatically faxed to [facility].  My Chart message sent to patient:               Ambreen Mora  [address] [place] [address]          2024    Dear Ms. Cristopherne,                Your provider has placed an order for you to get a new CPAP device.     If you don't hear from the supply company after a few weeks, their contact information is:  [facility]: [phone]  [place]: [phone]  [place]: [phone]  [phone]  [place]: [phone]  [place]: [phone]    Once you know the date of your appointment to pick-up your CPAP machine, please contact our office to schedule a follow up visit with your provider. This appointment should be scheduled 60-90 days from the day that you will be set up on your new device and is required by insurance.         [facility]   Schedule line: [phone]      Sincerely,                Catherine PENALOZA CMA  [facility]

## 2024-06-20 ENCOUNTER — APPOINTMENT (OUTPATIENT)
Dept: SLEEP MEDICINE | Facility: CLINIC | Age: 69
End: 2024-06-20
Payer: COMMERCIAL

## 2024-06-20 ENCOUNTER — DOCUMENTATION ONLY (OUTPATIENT)
Dept: SLEEP MEDICINE | Facility: CLINIC | Age: 69
End: 2024-06-20
Payer: COMMERCIAL

## 2024-06-20 ENCOUNTER — DOCUMENTATION ONLY (OUTPATIENT)
Dept: SLEEP MEDICINE | Facility: CLINIC | Age: 69
End: 2024-06-20

## 2024-06-20 DIAGNOSIS — G47.33 OSA (OBSTRUCTIVE SLEEP APNEA): Primary | ICD-10-CM

## 2024-06-20 NOTE — PROGRESS NOTES
Patient was offered choice of vendor and chose [facility].  Patient Ambreen Mora was set up at [facility]  on June 20, 2024. Patient received a Resmed Airsense 11. Pressure was set at  11 cmH2O . Patient s ramp is 7 cm H2O for auto and FLEX/EPR is EPR, 2. Patient received a [employer] mask name: Nuance Pro nasal pillow fitpack,   heated tubing and heated humidifier. Patient needs sleep follow up and compliance usage for Medicare compliance. Patient has a follow up on 9/19/2024 with Adriana Luna CNP.    Aminta Sellers

## 2024-06-25 ENCOUNTER — DOCUMENTATION ONLY (OUTPATIENT)
Dept: OTHER | Facility: CLINIC | Age: 69
End: 2024-06-25
Payer: COMMERCIAL

## 2024-09-04 ENCOUNTER — TELEPHONE (OUTPATIENT)
Dept: FAMILY MEDICINE | Facility: CLINIC | Age: 69
End: 2024-09-04
Payer: COMMERCIAL

## 2024-09-04 NOTE — TELEPHONE ENCOUNTER
Reason for Call:  Appointment Request    Patient requesting this type of appt:  Preventive     Requested provider: Shawn Nolan    Reason patient unable to be scheduled:  Has an appointment for 12/5/2024 but needs it the following week    When does patient want to be seen/preferred time:  week of December 9th    Comments: needs Dec 5th rescheduled    Could we send this information to you in [employer] or would you prefer to receive a phone call?:   Patient would prefer a phone call   Okay to leave a detailed message?: Yes at Cell number on file:    Telephone Information:   Mobile [phone]       Call taken on 9/4/2024 at 3:31 PM by Madalyn Delgadillo

## 2024-09-19 ENCOUNTER — VIRTUAL VISIT (OUTPATIENT)
Dept: SLEEP MEDICINE | Facility: CLINIC | Age: 69
End: 2024-09-19
Payer: COMMERCIAL

## 2024-09-19 VITALS — WEIGHT: 220 LBS | HEIGHT: 67 IN | BODY MASS INDEX: 34.53 KG/M2

## 2024-09-19 DIAGNOSIS — G47.33 OSA (OBSTRUCTIVE SLEEP APNEA): Primary | ICD-10-CM

## 2024-09-19 DIAGNOSIS — E55.9 VITAMIN D DEFICIENCY DISEASE: ICD-10-CM

## 2024-09-19 DIAGNOSIS — E66.01 MORBID OBESITY (H): ICD-10-CM

## 2024-09-19 DIAGNOSIS — E03.9 ACQUIRED HYPOTHYROIDISM: ICD-10-CM

## 2024-09-19 DIAGNOSIS — K22.4 ESOPHAGEAL DYSMOTILITY: ICD-10-CM

## 2024-09-19 DIAGNOSIS — E78.5 HYPERLIPIDEMIA WITH TARGET LDL LESS THAN 130: ICD-10-CM

## 2024-09-19 PROCEDURE — 99213 OFFICE O/P EST LOW 20 MIN: CPT | Mod: 95 | Performed by: NURSE PRACTITIONER

## 2024-09-19 ASSESSMENT — PAIN SCALES - GENERAL: PAINLEVEL: NO PAIN (0)

## 2024-09-19 NOTE — PATIENT INSTRUCTIONS
Drive Safe... Drive Alive     Sleep health profoundly affects your health, mood, and your safety. 33% of the population (one in three of us) is not getting enough sleep and many have a sleep disorder. Not getting enough sleep or having an untreated / undertreated sleep condition may make us sleepy without even knowing it. In fact, our driving could be dramatically impaired due to our sleep health. As your provider, here are some things I would like you to know about driving:     Here are some warning signs for impairment and dangerous drowsy driving:              -Having been awake more than 16 hours               -Looking tired               -Eyelid drooping              -Head nodding (it could be too late at this point)              -Driving for more than 30 minutes     Some things you could do to make the driving safer if you are experiencing some drowsiness:              -Stop driving and rest              -Call for transportation              -Make sure your sleep disorder is adequately treated     Some things that have been shown NOT to work when experiencing drowsiness while driving:              -Turning on the radio              -Opening windows              -Eating any  distracting  /  entertaining  foods (e.g., sunflower seeds, candy, or any other)              -Talking on the phone      Your decision may not only impact your life, but also the life of others. Please, remember to drive safe for yourself and all of us.       Your BMI is Body mass index is 34.46 kg/m .    What is BMI?  Body mass index (BMI) is one way to tell whether you are at a healthy weight, overweight, or obese. It measures your weight in relation to your height.  A BMI of 18.5 to 24.9 is in the healthy range. A person with a BMI of 25 to 29.9 is considered overweight, and someone with a BMI of 30 or greater is considered obese.  Another way to find out if you are at risk for health problems caused by overweight and obesity is to  measure your waist. If you are a woman and your waist is more than 35 inches, or if you are a man and your waist is more than 40 inches, your risk of disease may be higher.  More than two-thirds of American adults are considered overweight or obese. Being overweight or obese increases the risk for further weight gain.  Excess weight may lead to heart disease and diabetes. Creating and following plans for healthy eating and physical activity may help you improve your health.    Methods for maintaining or losing weight.  Weight control is part of healthy lifestyle and includes exercise, emotional health, and healthy eating habits.  Careful eating habits lifelong is the mainstay of weight control.  Though there are significant health benefits from weight loss, long-term weight loss with diet alone may be very difficult to achieve- studies show long-term success with dietary management in less than 10% of people. Attaining a healthy weight may be especially difficult to achieve in those with severe obesity. In some cases, medications, devices and surgical management might be considered.    What can you do?  If you are overweight or obese and are interested in methods for weight loss, you should discuss this with your provider. In addition, we recommend that you review healthy life styles and methods for weight loss available through the National Institutes of Health patient information sites:   http://win.niddk.nih.gov/publications/index.htm         Your Body mass index is 34.46 kg/m .  Weight management is a personal decision.  If you are interested in exploring weight loss strategies, the following discussion covers the approaches that may be successful. Body mass index (BMI) is one way to tell whether you are at a healthy weight, overweight, or obese. It measures your weight in relation to your height.  A BMI of 18.5 to 24.9 is in the healthy range. A person with a BMI of 25 to 29.9 is considered overweight, and  someone with a BMI of 30 or greater is considered obese. More than two-thirds of American adults are considered overweight or obese.  Being overweight or obese increases the risk for further weight gain. Excess weight may lead to heart disease and diabetes.  Creating and following plans for healthy eating and physical activity may help you improve your health.  Weight control is part of healthy lifestyle and includes exercise, emotional health, and healthy eating habits. Careful eating habits lifelong are the mainstay of weight control. Though there are significant health benefits from weight loss, long-term weight loss with diet alone may be very difficult to achieve- studies show long-term success with dietary management in less than 10% of people. Attaining a healthy weight may be especially difficult to achieve in those with severe obesity. In some cases, medications, devices and surgical management might be considered.  What can you do?  If you are overweight or obese and are interested in methods for weight loss, you should discuss this with your provider.   Consider reducing daily calorie intake by 500 calories.   Keep a food journal.   Avoiding skipping meals, consider cutting portions instead.    Diet combined with exercise helps maintain muscle while optimizing fat loss. Strength training is particularly important for building and maintaining muscle mass. Exercise helps reduce stress, increase energy, and improves fitness. Increasing exercise without diet control, however, may not burn enough calories to loose weight.     Start walking three days a week 10-20 minutes at a time  Work towards walking thirty minutes five days a week   Eventually, increase the speed of your walking for 1-2 minutes at time    In addition, we recommend that you review healthy lifestyles and methods for weight loss available through the National Institutes of Health patient information  sites:  http://win.niddk.nih.gov/publications/index.htm    And look into health and wellness programs that may be available through your health insurance provider, employer, local community center, or shaggy club.

## 2024-09-19 NOTE — PROGRESS NOTES
Virtual Visit Details    Type of service:  Video Visit   Video Start Time: 12:30 PM  Video End Time: 12:50 PM    Originating Location (pt. Location): Home    Distant Location (provider location):  Off-site  Platform used for Video Visit: Wilber          Obstructive Sleep Apnea - PAP Follow-Up Visit:    Chief Complaint   Patient presents with    RECHECK       Ambreen Mora comes in today for follow-up of their moderate sleep apnea, managed with CPAP.     PSG at the [facility] on 09/07/2011 at weight of 233.3 pounds showed apnea-hypopnea index of 22 per hour and RDI of 31 per hour. The lowest oxygen saturation was 69%.      She is prescribed CPAP at a pressure of 11 cm H2O.    Do you use a CPAP Machine at home: Yes  Overall, on a scale of 0-10 how would you rate your CPAP (0 poor, 10 great): 8  Is your mask comfortable: Yes  If not, why:    Is you mask leaking: No  If yes, where do you feel it:    How many night per week does the mask leak (0-7):    Do you notice snoring with mask on: No  Do you notice gasping arousals with mask on: No  Are you having significant oral or nasal dryness: No  Is the pressure setting comfortable: Yes  In not, why:    What type of mask do you use: Nasal Pillow  What is your typical bedtime: 11-11:30pm  How long does it take you to go to sleep on PAP therapy: Usually 10 min  What time do you typically get out of bed for the day: 7:00am  How many hours on average per night are you using PAP therapy: 5-6 hours  How many hours are you sleeping per night: 5-8 hours  Do you feel well rested in the morning: Yes    Ambreen notes that she has been dealing with a lot of nasal congestion and upper respiratory symptoms.  She has been taking antihistamines to combat the symptoms which she finds relatively effective.  She looks to change her mask soon after this order was placed as a result of this viral infection.          EPWORTH SLEEPINESS SCALE         9/19/2024    12:00 PM     Lincoln Sleepiness Scale ( YANELY Estrella  [id number]<br>ESS - [place]/English - Final version - 21 Nov 07 - [facility] [place].)   Sitting and reading Slight chance of dozing   Watching TV Slight chance of dozing   Sitting, inactive in a public place (e.g. a theatre or a meeting) Would never doze   As a passenger in a car for an hour without a break High chance of dozing   Lying down to rest in the afternoon when circumstances permit Slight chance of dozing   Sitting and talking to someone Would never doze   Sitting quietly after a lunch without alcohol Would never doze   In a car, while stopped for a few minutes in traffic Would never doze   Lincoln Score (MC) 6   Lincoln Score (Sleep) 6       INSOMNIA SEVERITY INDEX (ELIZA)          9/19/2024    11:56 AM   Insomnia Severity Index (ELIZA)   Difficulty falling asleep 1   Difficulty staying asleep 1   Problems waking up too early 2   How SATISFIED/DISSATISFIED are you with your CURRENT sleep pattern? 1   How NOTICEABLE to others do you think your sleep problem is in terms of impairing the quality of your life? 1   How WORRIED/DISTRESSED are you about your current sleep problem? 1   To what extent do you consider your sleep problem to INTERFERE with your daily functioning (e.g. daytime fatigue, mood, ability to function at work/daily chores, concentration, memory, mood, etc.) CURRENTLY? 1   ELIZA Total Score 8       Guidelines for Scoring/Interpretation:  Total score categories:  0-7 = No clinically significant insomnia   8-14 = Subthreshold insomnia   15-21 = Clinical insomnia (moderate severity)  22-28 = Clinical insomnia (severe)  Used via courtesy of www.DailyBurnth.va.gov with permission from Eric Estrada PhD., [facility]    ResMed   CPAP 11.0 cmH2O 30 day usage data:  93% of days with > 4 hours of use. 2/30 days with no use.   Average use 437 minutes per day.   95%ile Leak 18.46 L/min.   AHI 0.83 events per hour.             Reviewed by team:  Tobacco  Allergies  " Meds            Reviewed by provider:  Tobacco  Allergies  Meds  Problems  Med Hx  Surg Hx  Fam Hx           Problem List:  Patient Active Problem List    Diagnosis Date Noted    Morbid obesity (H) 11/23/2022     Priority: Medium    Left shoulder pain 01/19/2017     Priority: Medium    Acquired hypothyroidism 06/16/2016     Priority: Medium    Vitamin D deficiency disease 08/02/2013     Priority: Medium    External bleeding hemorrhoids 05/12/2013     Priority: Medium    Esophageal dysmotility 05/12/2013     Priority: Medium     EGD 2010 - with dilation      JASMYNE (obstructive sleep apnea) 05/06/2013     Priority: Medium    Hyperlipidemia with target LDL less than 130 05/06/2013     Priority: Medium     Diagnosis updated by automated process. Provider to review and confirm.            Ht 1.702 m (5' 7\")   Wt 99.8 kg (220 lb)   BMI 34.46 kg/m      Impression/Plan:    ICD-10-CM    1. JASMYNE (obstructive sleep apnea)  G47.33 CPAP Order for DME - ONLY FOR DME      2. Morbid obesity (H)  E66.01 CPAP Order for DME - ONLY FOR DME      3. Hyperlipidemia with target LDL less than 130  E78.5 CPAP Order for DME - ONLY FOR DME      4. Esophageal dysmotility  K22.4 CPAP Order for DME - ONLY FOR DME      5. Vitamin D deficiency disease  E55.9 CPAP Order for DME - ONLY FOR DME      6. Acquired hypothyroidism  E03.9 CPAP Order for DME - ONLY FOR DME        Ambreen Mora is a pleasant 69-year-old female who presents in clinic today for an evaluation of efficacy and compliance in the treatment of her moderate obstructive sleep apnea with CPAP therapy.  Her sleep apnea is well-controlled with a residual AHI of 0.83 events/hour.  She denies any daytime sleepiness with an [employer] sleepiness score of 6/24, and an insomnia score of 8/28.  She appreciates the benefits that CPAP brings to her.  A comprehensive order for needed supplies and equipment was placed today for the coming year.  Recommend that patient optimize her sleep " schedule as well as her sleep hygiene practices to mitigate any further sleep intrusion.  Recommend patient employ safe driving practices such as not driving a motor vehicle should she become drowsy.  Recommend Ambreen return to the sleep medicine clinic in 1 year, sooner if needed.  Recommend weight management to BMI of 30.0.    21 minutes spent with patient, all of which were spent face-to-face counseling, consulting, coordinating plan of care.      ISABEL Riggs CNP    CC:  Shawn Nolan,

## 2024-09-19 NOTE — NURSING NOTE
Current patient location: [address] [place] [address] [address] [place] [address]    Is the patient currently in the state of [place]? YES    Visit mode:VIDEO    If the visit is dropped, the patient can be reconnected by: VIDEO VISIT: Text to cell phone:   Telephone Information:   Mobile [phone]       Will anyone else be joining the visit? NO  (If patient encounters technical issues they should call [phone]    How would you like to obtain your AVS? MyChart    Are changes needed to the allergy or medication list? No    Are refills needed on medications prescribed by this physician? NO    Rooming Documentation:  Questionnaire(s) completed      Reason for visit: JOVITA SANTOROF

## 2024-11-15 ENCOUNTER — OFFICE VISIT (OUTPATIENT)
Dept: URGENT CARE | Facility: URGENT CARE | Age: 69
End: 2024-11-15
Payer: COMMERCIAL

## 2024-11-15 VITALS
BODY MASS INDEX: 36.99 KG/M2 | SYSTOLIC BLOOD PRESSURE: 140 MMHG | WEIGHT: 236.2 LBS | OXYGEN SATURATION: 93 % | HEART RATE: 71 BPM | TEMPERATURE: 98.1 F | RESPIRATION RATE: 24 BRPM | DIASTOLIC BLOOD PRESSURE: 90 MMHG

## 2024-11-15 DIAGNOSIS — R07.0 THROAT PAIN: ICD-10-CM

## 2024-11-15 DIAGNOSIS — J01.90 ACUTE SINUSITIS WITH SYMPTOMS > 10 DAYS: Primary | ICD-10-CM

## 2024-11-15 DIAGNOSIS — R05.1 ACUTE COUGH: ICD-10-CM

## 2024-11-15 DIAGNOSIS — R03.0 ELEVATED BLOOD PRESSURE READING WITHOUT DIAGNOSIS OF HYPERTENSION: ICD-10-CM

## 2024-11-15 LAB
DEPRECATED S PYO AG THROAT QL EIA: NEGATIVE
GROUP A STREP BY PCR: NOT DETECTED

## 2024-11-15 PROCEDURE — 99213 OFFICE O/P EST LOW 20 MIN: CPT | Performed by: PHYSICIAN ASSISTANT

## 2024-11-15 PROCEDURE — 87651 STREP A DNA AMP PROBE: CPT | Performed by: PHYSICIAN ASSISTANT

## 2024-11-15 RX ORDER — BENZONATATE 100 MG/1
100 CAPSULE ORAL 3 TIMES DAILY PRN
Qty: 30 CAPSULE | Refills: 0 | Status: SHIPPED | OUTPATIENT
Start: 2024-11-15 | End: 2024-11-25

## 2024-11-15 RX ORDER — DOXYCYCLINE 100 MG/1
100 CAPSULE ORAL 2 TIMES DAILY
Qty: 20 CAPSULE | Refills: 0 | Status: SHIPPED | OUTPATIENT
Start: 2024-11-15 | End: 2024-11-25

## 2024-11-15 NOTE — PROGRESS NOTES
Chief Complaint   Patient presents with    Urgent Care    Pharyngitis     Been sick for 2 weeks and the sore throat not better, had a negative home covid test last week          Results for orders placed or performed in visit on 11/15/24   Streptococcus A Rapid Screen w/Reflex to PCR - Clinic Collect     Status: Normal    Specimen: Throat; Swab   Result Value Ref Range    Group A Strep antigen Negative Negative               ASSESSMENT:     ICD-10-CM    1. Acute sinusitis with symptoms > 10 days  J01.90 doxycycline hyclate (VIBRAMYCIN) 100 MG capsule     benzonatate (TESSALON) 100 MG capsule      2. Throat pain  R07.0 Streptococcus A Rapid Screen w/Reflex to PCR - Clinic Collect     Group A Streptococcus PCR Throat Swab     doxycycline hyclate (VIBRAMYCIN) 100 MG capsule     benzonatate (TESSALON) 100 MG capsule      3. Acute cough  R05.1 doxycycline hyclate (VIBRAMYCIN) 100 MG capsule     benzonatate (TESSALON) 100 MG capsule            PLAN: Further strep test pending.  Likely acute sinusitis.  Coughing to clear the mucus/postnasal drip from the back of her throat.  Oral doxycycline.  Tessalon Perles.  I have discussed clinical findings with patient.  Side effects of medications discussed.  Symptomatic care is discussed.  I have discussed the possibility of  worsening symptoms and indication to RTC or go to the ER if they occur.  All questions are answered, patient indicates understanding of these issues and is in agreement with plan.   Patient care instructions are discussed/given at the end of visit.   Lots of rest and fluids.  Recheck blood pressure outside of clinic once feeling better and follow-up with primary care provider if any concerns.    Maryellen Fine PA-C      SUBJECTIVE:  Seen 9-year-old female presents for sore throat and cough with postnasal drip for the past 2 weeks.  No fever.  Had body aches to begin with.  Negative COVID test.  No nasal discharge.  Using OTC cold medication which helps  dry the mucus up in the back of her throat.      Allergies   Allergen Reactions    Hazelnuts [Nuts] Swelling     Raw almonds and pecans    Macrodantin [Nitrofurantoin]     Pcn [Penicillins]      Hives    Prochlorperazine Other (See Comments)     Slurred speech    Seasonal Allergies Other (See Comments)     Eyes swollen    Sulfa Antibiotics        Past Medical History:   Diagnosis Date    Esophageal dysmotility 5/12/2013    EGD 2010 - with dilation     External bleeding hemorrhoids 5/12/2013    Hyperlipidaemia LDL goal < 130 5/6/2013    Hypothyroidism 5/6/2013    Obesity 8/2/2013    JASMYNE (obstructive sleep apnea) 5/6/2013    cpap    Vertigo 5-13    Vitamin D deficiency disease 8/2/2013       Current Outpatient Medications   Medication Sig Dispense Refill    atorvastatin (LIPITOR) 20 MG tablet Take 1 tablet (20 mg) by mouth daily 90 tablet 3    levothyroxine (SYNTHROID/LEVOTHROID) 100 MCG tablet Take 1 tablet (100 mcg) by mouth daily 90 tablet 3    omeprazole (PRILOSEC) 20 MG DR capsule Take 1 capsule (20 mg) by mouth daily 90 capsule 3     No current facility-administered medications for this visit.       Social History     Tobacco Use    Smoking status: Never    Smokeless tobacco: Never   Substance Use Topics    Alcohol use: No     Alcohol/week: 0.0 standard drinks of alcohol       ROS:  CONSTITUTIONAL: Negative for fatigue or fever.  EYES: Negative for eye problems.  ENT: As above.  RESP: As above.  CV: Negative for chest pains.  GI: Negative for vomiting.  MUSCULOSKELETAL:  Negative for significant muscle or joint pains.  NEUROLOGIC: Negative for headaches.  SKIN: Negative for rash.  PSYCH: Normal mentation for age.    OBJECTIVE:  BP (!) 172/90 (BP Location: Left arm, Patient Position: Sitting, Cuff Size: Adult Large)   Pulse 71   Temp 98.1  F (36.7  C) (Tympanic)   Resp 24   Wt 107.1 kg (236 lb 3.2 oz)   SpO2 93%   BMI 36.99 kg/m    GENERAL APPEARANCE: Healthy, alert and no distress.  EYES:Conjunctiva/sclera  clear.  EARS: No cerumen.   Ear canals w/o erythema.  TM's intact w/o erythema.    SINUSES: No maxillary sinus tenderness.  THROAT: Mild  erythema w/o tonsillar enlargement . No exudates.  NECK: Supple, nontender, no lymphadenopathy.  RESP: Lungs clear to auscultation - no rales, rhonchi or wheezes  CV: Regular rate and rhythm, normal S1 S2, no murmur noted.  NEURO: Awake, alert    SKIN: No rashes      Maryellen Fine PA-C

## 2024-12-12 ENCOUNTER — OFFICE VISIT (OUTPATIENT)
Dept: FAMILY MEDICINE | Facility: CLINIC | Age: 69
End: 2024-12-12
Payer: COMMERCIAL

## 2024-12-12 VITALS
HEART RATE: 65 BPM | RESPIRATION RATE: 18 BRPM | WEIGHT: 235 LBS | HEIGHT: 66 IN | TEMPERATURE: 97.3 F | BODY MASS INDEX: 37.77 KG/M2 | DIASTOLIC BLOOD PRESSURE: 86 MMHG | SYSTOLIC BLOOD PRESSURE: 133 MMHG | OXYGEN SATURATION: 94 %

## 2024-12-12 DIAGNOSIS — E55.9 VITAMIN D DEFICIENCY DISEASE: ICD-10-CM

## 2024-12-12 DIAGNOSIS — E78.5 HYPERLIPIDEMIA LDL GOAL <100: ICD-10-CM

## 2024-12-12 DIAGNOSIS — G47.33 OSA (OBSTRUCTIVE SLEEP APNEA): ICD-10-CM

## 2024-12-12 DIAGNOSIS — R22.0 LIP SWELLING: ICD-10-CM

## 2024-12-12 DIAGNOSIS — E66.01 MORBID OBESITY (H): ICD-10-CM

## 2024-12-12 DIAGNOSIS — E03.9 ACQUIRED HYPOTHYROIDISM: ICD-10-CM

## 2024-12-12 DIAGNOSIS — Z00.00 ENCOUNTER FOR ANNUAL WELLNESS VISIT (AWV) IN MEDICARE PATIENT: Primary | ICD-10-CM

## 2024-12-12 DIAGNOSIS — K22.4 ESOPHAGEAL DYSMOTILITY: ICD-10-CM

## 2024-12-12 LAB
ALBUMIN SERPL BCG-MCNC: 4.2 G/DL (ref 3.5–5.2)
ALP SERPL-CCNC: 108 U/L (ref 40–150)
ALT SERPL W P-5'-P-CCNC: 18 U/L (ref 0–50)
ANION GAP SERPL CALCULATED.3IONS-SCNC: 10 MMOL/L (ref 7–15)
AST SERPL W P-5'-P-CCNC: 21 U/L (ref 0–45)
BILIRUB SERPL-MCNC: 0.3 MG/DL
BUN SERPL-MCNC: 12.7 MG/DL (ref 8–23)
CALCIUM SERPL-MCNC: 9.3 MG/DL (ref 8.8–10.4)
CHLORIDE SERPL-SCNC: 103 MMOL/L (ref 98–107)
CHOLEST SERPL-MCNC: 177 MG/DL
CREAT SERPL-MCNC: 0.85 MG/DL (ref 0.51–0.95)
EGFRCR SERPLBLD CKD-EPI 2021: 74 ML/MIN/1.73M2
ERYTHROCYTE [DISTWIDTH] IN BLOOD BY AUTOMATED COUNT: 13.6 % (ref 10–15)
FASTING STATUS PATIENT QL REPORTED: YES
FASTING STATUS PATIENT QL REPORTED: YES
GLUCOSE SERPL-MCNC: 101 MG/DL (ref 70–99)
HCO3 SERPL-SCNC: 26 MMOL/L (ref 22–29)
HCT VFR BLD AUTO: 43.6 % (ref 35–47)
HDLC SERPL-MCNC: 35 MG/DL
HGB BLD-MCNC: 13.9 G/DL (ref 11.7–15.7)
LDLC SERPL CALC-MCNC: 105 MG/DL
MCH RBC QN AUTO: 27.5 PG (ref 26.5–33)
MCHC RBC AUTO-ENTMCNC: 31.9 G/DL (ref 31.5–36.5)
MCV RBC AUTO: 86 FL (ref 78–100)
NONHDLC SERPL-MCNC: 142 MG/DL
PLATELET # BLD AUTO: 249 10E3/UL (ref 150–450)
POTASSIUM SERPL-SCNC: 4 MMOL/L (ref 3.4–5.3)
PROT SERPL-MCNC: 7.4 G/DL (ref 6.4–8.3)
RBC # BLD AUTO: 5.06 10E6/UL (ref 3.8–5.2)
SODIUM SERPL-SCNC: 139 MMOL/L (ref 135–145)
TRIGL SERPL-MCNC: 185 MG/DL
TSH SERPL DL<=0.005 MIU/L-ACNC: 3.4 UIU/ML (ref 0.3–4.2)
VIT D+METAB SERPL-MCNC: 39 NG/ML (ref 20–50)
WBC # BLD AUTO: 7.3 10E3/UL (ref 4–11)

## 2024-12-12 RX ORDER — ATORVASTATIN CALCIUM 20 MG/1
20 TABLET, FILM COATED ORAL DAILY
Qty: 90 TABLET | Refills: 3 | Status: SHIPPED | OUTPATIENT
Start: 2024-12-12

## 2024-12-12 SDOH — HEALTH STABILITY: PHYSICAL HEALTH: ON AVERAGE, HOW MANY DAYS PER WEEK DO YOU ENGAGE IN MODERATE TO STRENUOUS EXERCISE (LIKE A BRISK WALK)?: 3 DAYS

## 2024-12-12 SDOH — HEALTH STABILITY: PHYSICAL HEALTH: ON AVERAGE, HOW MANY MINUTES DO YOU ENGAGE IN EXERCISE AT THIS LEVEL?: 40 MIN

## 2024-12-12 ASSESSMENT — PAIN SCALES - GENERAL: PAINLEVEL_OUTOF10: NO PAIN (0)

## 2024-12-12 ASSESSMENT — SOCIAL DETERMINANTS OF HEALTH (SDOH): HOW OFTEN DO YOU GET TOGETHER WITH FRIENDS OR RELATIVES?: MORE THAN THREE TIMES A WEEK

## 2024-12-12 NOTE — PROGRESS NOTES
"Preventive Care Visit  [facility] SATYA Nolan MD, Internal Medicine  Dec 12, 2024      Assessment & Plan     Encounter for annual wellness visit (AWV) in Medicare patient  This is a very nice 69 years old woman who has retired and is doing very well she will went to [place] and walked about 20,000 steps\  Annual mammogram will be done and up to date on colonoscopy and Pap smear is not needed  She is unable to lose weight as below    Acquired hypothyroidism  Patient is on Synthroid 100 mcg daily  - TSH with free T4 reflex  - REVIEW OF HEALTH MAINTENANCE PROTOCOL ORDERS    JASMYNE (obstructive sleep apnea)  Patient uses a CPAP and very compliant  - REVIEW OF HEALTH MAINTENANCE PROTOCOL ORDERS    Morbid obesity (H)  BMI over 35 is complicated by sleep apnea and hyperlipidemia  She definitely should use GLP agonist and we discussed the side effects  - REVIEW OF HEALTH MAINTENANCE PROTOCOL ORDERS  - Semaglutide-Weight Management (WEGOVY) 1 MG/0.5ML pen  Dispense: 2 mL; Refill: 0  - Semaglutide-Weight Management (WEGOVY) 0.5 MG/0.5ML pen  Dispense: 2 mL; Refill: 0  - semaglutide-weight management (WEGOVY) 0.25 MG/0.5ML pen  Dispense: 2 mL; Refill: 0    Hyperlipidemia LDL goal <100  On Lipitor  - Lipid panel reflex to direct LDL Fasting  - Comprehensive metabolic panel  - REVIEW OF HEALTH MAINTENANCE PROTOCOL ORDERS  - atorvastatin (LIPITOR) 20 MG tablet  Dispense: 90 tablet; Refill: 3    Esophageal dysmotility  Much improved after stopping supplements and can resume vitamin D and calcium  - CBC with Platelets    - REVIEW OF HEALTH MAINTENANCE PROTOCOL ORDERS    Vitamin D deficiency disease    - Vitamin D Deficiency  - Vitamin D Deficiency    Lip swelling  Patient will see dermatology for skin test        BMI  Estimated body mass index is 37.77 kg/m  as calculated from the following:    Height as of this encounter: 1.68 m (5' 6.14\").    Weight as of this encounter: 106.6 kg (235 lb). "     Counseling  Appropriate preventive services were addressed with this patient via screening, questionnaire, or discussion as appropriate for fall prevention, nutrition, physical activity, , social engagement, weight loss and cognition.  Checklist reviewing preventive services available has been given to the patient.  Reviewed patient's diet, addressing concerns and/or questions.   She is at risk for lack of exercise and has been provided with information to increase physical activity for the benefit of her well-being.   The patient was provided with written information regarding signs of hearing loss.         Yunier Crook is a 69 year old, presenting for the following: Patient is doing very well and she is 69 years old and very pleasant  Last year she had an episode of lip swelling which was attributed to toothpaste  She has changed it and the swelling has gone away but she has had it once again  She does not have any symptoms of chest pain or shortness of breath or other symptoms      She walks a lot  Her main worry is that she is unable to lose weight  Physical (fasting)          HPI        Health Care Directive  Patient has a Health Care Directive on file        12/12/2024   General Health   How would you rate your overall physical health? Excellent   Feel stress (tense, anxious, or unable to sleep) Not at all            12/12/2024   Nutrition   Diet: Regular (no restrictions)            12/12/2024   Exercise   Days per week of moderate/strenous exercise 3 days   Average minutes spent exercising at this level 40 min            12/12/2024   Social Factors   Frequency of gathering with friends or relatives More than three times a week   Worry food won't last until get money to buy more No   Food not last or not have enough money for food? No   Do you have housing? (Housing is defined as stable permanent housing and does not include staying ouside in a car, in a tent, in an abandoned building, in an  overnight shelter, or couch-surfing.) Yes   Are you worried about losing your housing? No   Lack of transportation? No   Unable to get utilities (heat,electricity)? No            12/12/2024   Fall Risk   Fallen 2 or more times in the past year? No     No    Trouble with walking or balance? No     No        Patient-reported    Multiple values from one day are sorted in reverse-chronological order          12/12/2024   Activities of Daily Living- Home Safety   Needs help with the following daily activites None of the above   Safety concerns in the home None of the above            12/12/2024   Dental   Dentist two times every year? Yes            12/12/2024   Hearing Screening   Hearing concerns? (!) I FEEL THAT PEOPLE ARE MUMBLING OR NOT SPEAKING CLEARLY.    (!) I NEED TO ASK PEOPLE TO SPEAK UP OR REPEAT THEMSELVES.    (!) IT'S HARDER TO UNDERSTAND WOMEN'S VOICES THAN MEN'S VOICES.    (!) TROUBLE UNDERSTANDING SOFT OR WHISPERED SPEECH.       Multiple values from one day are sorted in reverse-chronological order         12/12/2024   Driving Risk Screening   Patient/family members have concerns about driving No            12/12/2024   General Alertness/Fatigue Screening   Have you been more tired than usual lately? No            12/12/2024   Urinary Incontinence Screening   Bothered by leaking urine in past 6 months No            12/12/2024   TB Screening   Were you born outside of the [place]? No            Today's PHQ-2 Score:       12/12/2024     7:10 AM   PHQ-2 ( 1999 [employer])   Q1: Little interest or pleasure in doing things 0    Q2: Feeling down, depressed or hopeless 0    PHQ-2 Score 0    Q1: Little interest or pleasure in doing things Not at all   Q2: Feeling down, depressed or hopeless Not at all   PHQ-2 Score 0       Patient-reported           12/12/2024   Substance Use   Alcohol more than 3/day or more than 7/wk No   Do you have a current opioid prescription? No   How severe/bad is pain from 1 to 10? 0/10 (No  Pain)   Do you use any other substances recreationally? No        Social History     Tobacco Use    Smoking status: Never    Smokeless tobacco: Never   Substance Use Topics    Alcohol use: No    Drug use: No           3/15/2024   LAST FHS-7 RESULTS   1st degree relative breast or ovarian cancer No   Any relative bilateral breast cancer No   Any male have breast cancer No   Any ONE woman have BOTH breast AND ovarian cancer No   Any woman with breast cancer before 50yrs No   2 or more relatives with breast AND/OR ovarian cancer No   2 or more relatives with breast AND/OR bowel cancer No           Mammogram Screening - Mammogram every 1-2 years updated in Health Maintenance based on mutual decision making      History of abnormal Pap smear: No - age 65 or older with adequate negative prior screening test results (3 consecutive negative cytology results, 2 consecutive negative cotesting results, or 2 consecutive negative HrHPV test results within 10 years, with the most recent test occurring within the recommended screening interval for the test used)       ASCVD Risk   The 10-year ASCVD risk score (Tommy MCCOY, et al., 2019) is: 9.8%    Values used to calculate the score:      Age: 69 years      Sex: Female      Is Non- : No      Diabetic: No      Tobacco smoker: No      Systolic Blood Pressure: 133 mmHg      Is BP treated: No      HDL Cholesterol: 33 mg/dL      Total Cholesterol: 172 mg/dL            Reviewed and updated as needed this visit by Provider     Meds  Problems  Med Hx   Fam Hx            BP Readings from Last 3 Encounters:   12/12/24 133/86   11/15/24 (!) 140/90   12/05/23 127/82    Wt Readings from Last 3 Encounters:   12/12/24 106.6 kg (235 lb)   11/15/24 107.1 kg (236 lb 3.2 oz)   09/19/24 99.8 kg (220 lb)                  Patient Active Problem List   Diagnosis    JASMYNE (obstructive sleep apnea)    Hyperlipidemia LDL goal <100    External bleeding hemorrhoids     Esophageal dysmotility    Vitamin D deficiency disease    Acquired hypothyroidism    Left shoulder pain    Morbid obesity (H)     Past Surgical History:   Procedure Laterality Date    BLADDER SURGERY  19    honeycomb    COLONOSCOPY  2005    GENITOURINARY SURGERY  1974    HYSTERECTOMY, PAP NO LONGER INDICATED  1980s       Social History     Tobacco Use    Smoking status: Never    Smokeless tobacco: Never   Substance Use Topics    Alcohol use: No     Family History   Problem Relation Age of Onset    Hearing Loss Mother         mennier's disease    Thyroid Disease Mother     Thyroid Disease Father     Hyperlipidemia Sister     Thyroid Disease Sister     Obesity Sister     Hyperlipidemia Sister     Thyroid Disease Sister     Obesity Sister     Brain Tumor Brother         Pituitary    Thyroid nodules Brother     Valvular heart disease Brother     Mental Illness Niece     Breast Cancer Other 41         Current Outpatient Medications   Medication Sig Dispense Refill    atorvastatin (LIPITOR) 20 MG tablet Take 1 tablet (20 mg) by mouth daily. 90 tablet 3    levothyroxine (SYNTHROID/LEVOTHROID) 100 MCG tablet TAKE 1 TABLET BY MOUTH EVERY DAY 90 tablet 0    semaglutide-weight management (WEGOVY) 0.25 MG/0.5ML pen Inject 0.5 mLs (0.25 mg) subcutaneously once a week for 4 doses. 2 mL 0    Semaglutide-Weight Management (WEGOVY) 0.5 MG/0.5ML pen Inject 0.5 mg subcutaneously once a week for 4 doses. 2 mL 0    Semaglutide-Weight Management (WEGOVY) 1 MG/0.5ML pen Inject 1 mg subcutaneously once a week for 4 doses. 2 mL 0     Allergies   Allergen Reactions    Hazelnuts [Nuts] Swelling     Raw almonds and pecans    Macrodantin [Nitrofurantoin]     Pcn [Penicillins]      Hives    Prochlorperazine Other (See Comments)     Slurred speech    Seasonal Allergies Other (See Comments)     Eyes swollen    Sulfa Antibiotics      Current providers sharing in care for this patient include:  Patient Care Team:  Shawn Nolan MD as PCP - General  "(Internal Medicine)  Shawn Nolan MD as Assigned PCP  Zuri Carter MD as MD (Dermatology)  Zuri Carter MD as Assigned Surgical Provider  Issa Whitehead MD as Assigned Sleep Provider  Adriana Luna APRN CNP as Assigned Pulmonology Provider  Yulia Acuña MD as MD (Dermatology)    The following health maintenance items are reviewed in Epic and correct as of today:  Health Maintenance   Topic Date Due    DTAP/TDAP/TD IMMUNIZATION (2 - Td or Tdap) 08/02/2023    BMP  11/17/2024    LIPID  11/17/2024    TSH W/FREE T4 REFLEX  11/17/2024    DEXA  03/13/2025    MEDICARE ANNUAL WELLNESS VISIT  12/12/2025    ANNUAL REVIEW OF HM ORDERS  12/12/2025    FALL RISK ASSESSMENT  12/12/2025    MAMMO SCREENING  03/15/2026    GLUCOSE  11/17/2026    COLORECTAL CANCER SCREENING  08/14/2027    ADVANCE CARE PLANNING  06/25/2029    PHQ-2 (once per calendar year)  Completed    INFLUENZA VACCINE  Completed    Pneumococcal Vaccine: 65+ Years  Completed    ZOSTER IMMUNIZATION  Completed    RSV VACCINE  Completed    COVID-19 Vaccine  Completed    HPV IMMUNIZATION  Aged Out    MENINGITIS IMMUNIZATION  Aged Out    RSV MONOCLONAL ANTIBODY  Aged Out    HEPATITIS C SCREENING  Discontinued         Review of Systems  Constitutional, HEENT, cardiovascular, pulmonary, GI, , musculoskeletal, neuro, skin, endocrine and psych systems are negative, except as otherwise noted.     Objective    Exam  /86 (BP Location: Right arm, Patient Position: Sitting, Cuff Size: Adult Large)   Pulse 65   Temp 97.3  F (36.3  C)   Resp 18   Ht 1.68 m (5' 6.14\")   Wt 106.6 kg (235 lb)   SpO2 94%   BMI 37.77 kg/m     Estimated body mass index is 37.77 kg/m  as calculated from the following:    Height as of this encounter: 1.68 m (5' 6.14\").    Weight as of this encounter: 106.6 kg (235 lb).    Physical Exam  GENERAL: alert and no distress  EYES: Eyes grossly normal to inspection, PERRL and conjunctivae and sclerae normal  HENT: ear canals and " TM's normal, nose and mouth without ulcers or lesions  NECK: no adenopathy, no asymmetry, masses, or scars  RESP: lungs clear to auscultation - no rales, rhonchi or wheezes  BREAST: normal without masses, tenderness or nipple discharge and no palpable axillary masses or adenopathy  CV: regular rate and rhythm, normal S1 S2, no S3 or S4, no murmur, click or rub, no peripheral edema  ABDOMEN: soft, nontender, no hepatosplenomegaly, no masses and bowel sounds normal  MS: no gross musculoskeletal defects noted, no edema  SKIN: no suspicious lesions or rashes  Intertrigo under right breast and some benign lesions  NEURO: Normal strength and tone, mentation intact and speech normal  PSYCH: mentation appears normal, affect normal/bright         12/12/2024   Mini Cog   Clock Draw Score 2 Normal   3 Item Recall 3 objects recalled   Mini Cog Total Score 5                 Signed Electronically by: Shawn Nolan MD

## 2024-12-13 ENCOUNTER — TELEPHONE (OUTPATIENT)
Dept: FAMILY MEDICINE | Facility: CLINIC | Age: 69
End: 2024-12-13

## 2024-12-13 NOTE — TELEPHONE ENCOUNTER
Prior Authorization Retail Medication Request    Medication/Dose: Wegovy 0.25mg/0.5mL  Diagnosis and ICD code (if different than what is on RX):  E66.01  New/renewal/insurance change PA/secondary ins. PA:  Previously Tried and Failed:  None  Rationale:  BMI over 35 is complicated by sleep apnea and hyperlipidemia     Insurance   Primary: [employer]  Insurance ID:  [id number]    Secondary (if applicable):  Insurance ID:      Pharmacy Information (if different than what is on RX)  Name:  [id number] [place]  Phone:  [phone]  Fax:[phone]    Clinic Information  Preferred routing pool for dept communication: [facility] Pool ([id number])

## 2024-12-17 NOTE — TELEPHONE ENCOUNTER
Retail Pharmacy Prior Authorization Team   Phone: [phone]    PA Initiation    Medication: WEGOVY 0.25 MG/0.5ML SC SOAJ  Insurance Company: [employer] - Phone [phone] Fax [phone]  Pharm[facility]illing [id number] [place] [facility] [address] [facility]  Filling Pharmacy Phone: [phone]  Filling Pharmacy Fax:    Start Date: 12/17/2024

## 2024-12-18 NOTE — TELEPHONE ENCOUNTER
PRIOR AUTHORIZATION DENIED    Medication: WEGOVY 0.25 MG/0.5ML SC SOAJ  Insurance Company: [employer] - Phone [phone] Fax [phone]  Denial Date: 12/18/2024  Denial Rational:           Appeal Information: Drug exclusions can not be appealed. This medication will not be covered by the prescription plan for any reason. The drug is not on formulary and there are no loopholes to gaining approval.     Patient Notified: No

## 2025-01-07 ENCOUNTER — OFFICE VISIT (OUTPATIENT)
Dept: DERMATOLOGY | Facility: CLINIC | Age: 70
End: 2025-01-07
Payer: COMMERCIAL

## 2025-01-07 DIAGNOSIS — Z12.83 SKIN CANCER SCREENING: Primary | ICD-10-CM

## 2025-01-07 DIAGNOSIS — L21.9 DERMATITIS, SEBORRHEIC: ICD-10-CM

## 2025-01-07 DIAGNOSIS — L82.0 SEBORRHEIC KERATOSES, INFLAMED: ICD-10-CM

## 2025-01-07 PROCEDURE — 99214 OFFICE O/P EST MOD 30 MIN: CPT | Performed by: STUDENT IN AN ORGANIZED HEALTH CARE EDUCATION/TRAINING PROGRAM

## 2025-01-07 RX ORDER — HYDROCORTISONE 25 MG/G
OINTMENT TOPICAL 2 TIMES DAILY
Qty: 30 G | Refills: 1 | Status: SHIPPED | OUTPATIENT
Start: 2025-01-07

## 2025-01-07 ASSESSMENT — PAIN SCALES - GENERAL: PAINLEVEL_OUTOF10: NO PAIN (0)

## 2025-01-07 NOTE — PROGRESS NOTES
[facility] Dermatology Note  Encounter Date: Jan 7, 2025  Office Visit     Dermatology Problem List:  Last skin check: 7/27/23    # TBSE    No family hx of melanoma but father with hx of unknown skin cancer  No personal hx of melanoma    ____________________________________________    Assessment & Plan:    # Seb Derm (ears)  # Lip dermatitis  # ISK under right breast  - Start Hydrocortisone 2.5% ointment BID PRN.  - Would like to get dermatitis under control as she may need hearing aids in the future, but would like to avoid concurrent rash.    # Solar lentigines on the sun exposed skin areas. Benign nature was discussed. No further intervention required at this time.      #. Irritated Keratosis, disturbance of skin sensation  - Reviewed nature and etiology.  - Monitoring vs cryotherapy. Patient opted for monitoring.    # Multiple clinically benign nevi on the trunk and extremities. No treatment is necessary at this time unless the lesion changes or becomes symptomatic.      #Skin lesion. Consistent with ink spot lentigo, declines biopsy Has had for years. Has not changed. Monitor for changes.       Procedures Performed:   None.      Follow-up: 1 year(s) in-person, or earlier for new or changing lesions    Staff:  Yulia Acuña MD PhD  Dermatology, Dermatopathology    ____________________________________________    CC: Skin Check    HPI:  Ms. Ambreen Mora is a(n) 69 year old female who presents today as a new patient for a skin check. Nothing bleeding, crusting, or changing. Pt reports there is nothing she would like removed or treated at this time unless there is concerning lesion noted on exam.    Spot on abdomen has not changed. Stable to improved/fading.       Patient is otherwise feeling well, without additional skin concerns.    Labs Reviewed:  N/A    Physical Exam:  Vitals: Breastfeeding No   SKIN: Total skin including external buttocks was performed. The exam included the head/face,  neck, both arms, chest, back, abdomen, both legs, digits and/or nails. Nails painted   -6mm brown ink like macule-central abdomen.   - There are waxy stuck on tan to brown papules on the trunk and extremities.  - Scattered brown macules on sun exposed areas.  - Scaly ears, external  - Multiple regular brown pigmented macules and papules are identified on the trunk and extremities.   - No other lesions of concern on areas examined.     Medications:  Current Outpatient Medications   Medication Sig Dispense Refill    atorvastatin (LIPITOR) 20 MG tablet Take 1 tablet (20 mg) by mouth daily. 90 tablet 3    levothyroxine (SYNTHROID/LEVOTHROID) 100 MCG tablet TAKE 1 TABLET BY MOUTH EVERY DAY 90 tablet 0     No current facility-administered medications for this visit.      Past Medical History:   Patient Active Problem List   Diagnosis    JASMYNE (obstructive sleep apnea)    Hyperlipidemia LDL goal <100    External bleeding hemorrhoids    Esophageal dysmotility    Vitamin D deficiency disease    Acquired hypothyroidism    Left shoulder pain    Morbid obesity (H)     Past Medical History:   Diagnosis Date    Esophageal dysmotility 05/12/2013    EGD 2010 - with dilation     External bleeding hemorrhoids 05/12/2013    Hyperlipidaemia LDL goal < 130 05/06/2013    Hypothyroidism 05/06/2013    Obesity 08/02/2013    JASMYNE (obstructive sleep apnea) 05/06/2013    cpap    Vertigo 05/2013    Vitamin D deficiency disease 08/02/2013        CC Shawn Nolan MD  [address] [place] [id number] on close of this encounter.

## 2025-01-07 NOTE — LETTER
1/7/2025      Ambreen Mora  [address] [place] [address]      Dear Colleague,    Thank you for referring your patient, Ambreen Mora, to the [facility]. Please see a copy of my visit note below.    [facility] Dermatology Note  Encounter Date: Jan 7, 2025  Office Visit     Dermatology Problem List:  Last skin check: 7/27/23    # TBSE    No family hx of melanoma but father with hx of unknown skin cancer  No personal hx of melanoma    ____________________________________________    Assessment & Plan:    # Seb Derm (ears)  # Lip dermatitis  # ISK under right breast  - Start Hydrocortisone 2.5% ointment BID PRN.  - Would like to get dermatitis under control as she may need hearing aids in the future, but would like to avoid concurrent rash.    # Solar lentigines on the sun exposed skin areas. Benign nature was discussed. No further intervention required at this time.      #. Irritated Keratosis, disturbance of skin sensation  - Reviewed nature and etiology.  - Monitoring vs cryotherapy. Patient opted for monitoring.    # Multiple clinically benign nevi on the trunk and extremities. No treatment is necessary at this time unless the lesion changes or becomes symptomatic.      #Skin lesion. Consistent with ink spot lentigo, declines biopsy Has had for years. Has not changed. Monitor for changes.       Procedures Performed:   None.      Follow-up: 1 year(s) in-person, or earlier for new or changing lesions    Staff:  Yulia Acuña MD PhD  Dermatology, Dermatopathology    ____________________________________________    CC: Skin Check    HPI:  Ms. Ambreen Mora is a(n) 69 year old female who presents today as a new patient for a skin check. Nothing bleeding, crusting, or changing. Pt reports there is nothing she would like removed or treated at this time unless there is concerning lesion noted on exam.    Spot on abdomen has not changed. Stable to  improved/fading.       Patient is otherwise feeling well, without additional skin concerns.    Labs Reviewed:  N/A    Physical Exam:  Vitals: Breastfeeding No   SKIN: Total skin including external buttocks was performed. The exam included the head/face, neck, both arms, chest, back, abdomen, both legs, digits and/or nails. Nails painted   -6mm brown ink like macule-central abdomen.   - There are waxy stuck on tan to brown papules on the trunk and extremities.  - Scattered brown macules on sun exposed areas.  - Scaly ears, external  - Multiple regular brown pigmented macules and papules are identified on the trunk and extremities.   - No other lesions of concern on areas examined.     Medications:  Current Outpatient Medications   Medication Sig Dispense Refill     atorvastatin (LIPITOR) 20 MG tablet Take 1 tablet (20 mg) by mouth daily. 90 tablet 3     levothyroxine (SYNTHROID/LEVOTHROID) 100 MCG tablet TAKE 1 TABLET BY MOUTH EVERY DAY 90 tablet 0     No current facility-administered medications for this visit.      Past Medical History:   Patient Active Problem List   Diagnosis     JASMYNE (obstructive sleep apnea)     Hyperlipidemia LDL goal <100     External bleeding hemorrhoids     Esophageal dysmotility     Vitamin D deficiency disease     Acquired hypothyroidism     Left shoulder pain     Morbid obesity (H)     Past Medical History:   Diagnosis Date     Esophageal dysmotility 05/12/2013    EGD 2010 - with dilation      External bleeding hemorrhoids 05/12/2013     Hyperlipidaemia LDL goal < 130 05/06/2013     Hypothyroidism 05/06/2013     Obesity 08/02/2013     JASMYNE (obstructive sleep apnea) 05/06/2013    cpap     Vertigo 05/2013     Vitamin D deficiency disease 08/02/2013        CC Shawn Nolan MD  [id number] [place] [address] [address] on close of this encounter.       Again, thank you for allowing me to participate in the care of your patient.        Sincerely,        Yulia Acuña MD    Electronically  signed

## 2025-02-22 DIAGNOSIS — E03.9 ACQUIRED HYPOTHYROIDISM: ICD-10-CM

## 2025-02-24 RX ORDER — LEVOTHYROXINE SODIUM 100 UG/1
100 TABLET ORAL DAILY
Qty: 90 TABLET | Refills: 0 | Status: SHIPPED | OUTPATIENT
Start: 2025-02-24

## 2025-02-24 NOTE — TELEPHONE ENCOUNTER
Prescription approved per [facility] Refill Protocol.  Chelsey Negrete RN  [facility]

## 2025-02-27 ENCOUNTER — TRANSFERRED RECORDS (OUTPATIENT)
Dept: HEALTH INFORMATION MANAGEMENT | Facility: CLINIC | Age: 70
End: 2025-02-27
Payer: COMMERCIAL

## 2025-04-07 ENCOUNTER — HOSPITAL ENCOUNTER (OUTPATIENT)
Dept: MAMMOGRAPHY | Facility: CLINIC | Age: 70
Discharge: HOME | End: 2025-04-07
Attending: INTERNAL MEDICINE | Admitting: INTERNAL MEDICINE
Payer: COMMERCIAL

## 2025-04-07 DIAGNOSIS — Z12.31 VISIT FOR SCREENING MAMMOGRAM: ICD-10-CM

## 2025-04-07 PROCEDURE — 77063 BREAST TOMOSYNTHESIS BI: CPT

## 2025-04-07 PROCEDURE — 77067 SCR MAMMO BI INCL CAD: CPT

## 2025-05-24 DIAGNOSIS — E03.9 ACQUIRED HYPOTHYROIDISM: ICD-10-CM

## 2025-05-27 RX ORDER — LEVOTHYROXINE SODIUM 100 UG/1
100 TABLET ORAL DAILY
Qty: 90 TABLET | Refills: 1 | Status: SHIPPED | OUTPATIENT
Start: 2025-05-27

## 2025-08-22 ENCOUNTER — TRANSFERRED RECORDS (OUTPATIENT)
Dept: HEALTH INFORMATION MANAGEMENT | Facility: CLINIC | Age: 70
End: 2025-08-22
Payer: COMMERCIAL

## 2025-08-27 ENCOUNTER — TRANSFERRED RECORDS (OUTPATIENT)
Dept: HEALTH INFORMATION MANAGEMENT | Facility: CLINIC | Age: 70
End: 2025-08-27
Payer: COMMERCIAL